# Patient Record
Sex: FEMALE | Race: BLACK OR AFRICAN AMERICAN | NOT HISPANIC OR LATINO | Employment: UNEMPLOYED
[De-identification: names, ages, dates, MRNs, and addresses within clinical notes are randomized per-mention and may not be internally consistent; named-entity substitution may affect disease eponyms.]

---

## 2017-12-17 ENCOUNTER — HEALTH MAINTENANCE LETTER (OUTPATIENT)
Age: 32
End: 2017-12-17

## 2017-12-26 ENCOUNTER — HOSPITAL ENCOUNTER (EMERGENCY)
Facility: CLINIC | Age: 32
Discharge: HOME | End: 2017-12-26
Attending: EMERGENCY MEDICINE | Admitting: EMERGENCY MEDICINE
Payer: COMMERCIAL

## 2017-12-26 ENCOUNTER — APPOINTMENT (OUTPATIENT)
Dept: ULTRASOUND IMAGING | Facility: CLINIC | Age: 32
End: 2017-12-26
Attending: EMERGENCY MEDICINE
Payer: COMMERCIAL

## 2017-12-26 VITALS
DIASTOLIC BLOOD PRESSURE: 55 MMHG | TEMPERATURE: 98.1 F | RESPIRATION RATE: 20 BRPM | OXYGEN SATURATION: 100 % | HEART RATE: 82 BPM | SYSTOLIC BLOOD PRESSURE: 98 MMHG

## 2017-12-26 DIAGNOSIS — O21.0 HYPEREMESIS GRAVIDARUM: ICD-10-CM

## 2017-12-26 LAB
ALBUMIN SERPL-MCNC: 3.9 G/DL (ref 3.4–5)
ALP SERPL-CCNC: 59 U/L (ref 40–150)
ALT SERPL W P-5'-P-CCNC: 18 U/L (ref 0–50)
ANION GAP SERPL CALCULATED.3IONS-SCNC: 8 MMOL/L (ref 3–14)
AST SERPL W P-5'-P-CCNC: 21 U/L (ref 0–45)
B-HCG SERPL-ACNC: ABNORMAL IU/L (ref 0–5)
BASOPHILS # BLD AUTO: 0 10E9/L (ref 0–0.2)
BASOPHILS NFR BLD AUTO: 0.4 %
BILIRUB SERPL-MCNC: 0.4 MG/DL (ref 0.2–1.3)
BUN SERPL-MCNC: 8 MG/DL (ref 7–30)
CALCIUM SERPL-MCNC: 8.9 MG/DL (ref 8.5–10.1)
CHLORIDE SERPL-SCNC: 103 MMOL/L (ref 94–109)
CO2 SERPL-SCNC: 25 MMOL/L (ref 20–32)
CREAT SERPL-MCNC: 0.52 MG/DL (ref 0.52–1.04)
DIFFERENTIAL METHOD BLD: NORMAL
EOSINOPHIL # BLD AUTO: 0 10E9/L (ref 0–0.7)
EOSINOPHIL NFR BLD AUTO: 0.6 %
ERYTHROCYTE [DISTWIDTH] IN BLOOD BY AUTOMATED COUNT: 12.1 % (ref 10–15)
GFR SERPL CREATININE-BSD FRML MDRD: >90 ML/MIN/1.7M2
GLUCOSE SERPL-MCNC: 84 MG/DL (ref 70–99)
HCT VFR BLD AUTO: 41.2 % (ref 35–47)
HGB BLD-MCNC: 14 G/DL (ref 11.7–15.7)
IMM GRANULOCYTES # BLD: 0 10E9/L (ref 0–0.4)
IMM GRANULOCYTES NFR BLD: 0.4 %
LIPASE SERPL-CCNC: 95 U/L (ref 73–393)
LYMPHOCYTES # BLD AUTO: 1.5 10E9/L (ref 0.8–5.3)
LYMPHOCYTES NFR BLD AUTO: 29.8 %
MCH RBC QN AUTO: 30.4 PG (ref 26.5–33)
MCHC RBC AUTO-ENTMCNC: 34 G/DL (ref 31.5–36.5)
MCV RBC AUTO: 90 FL (ref 78–100)
MONOCYTES # BLD AUTO: 0.5 10E9/L (ref 0–1.3)
MONOCYTES NFR BLD AUTO: 9.1 %
NEUTROPHILS # BLD AUTO: 3 10E9/L (ref 1.6–8.3)
NEUTROPHILS NFR BLD AUTO: 59.7 %
NRBC # BLD AUTO: 0 10*3/UL
NRBC BLD AUTO-RTO: 0 /100
PLATELET # BLD AUTO: 188 10E9/L (ref 150–450)
POTASSIUM SERPL-SCNC: 3.8 MMOL/L (ref 3.4–5.3)
PROT SERPL-MCNC: 8 G/DL (ref 6.8–8.8)
RBC # BLD AUTO: 4.6 10E12/L (ref 3.8–5.2)
SODIUM SERPL-SCNC: 136 MMOL/L (ref 133–144)
WBC # BLD AUTO: 5 10E9/L (ref 4–11)

## 2017-12-26 PROCEDURE — 25000128 H RX IP 250 OP 636: Performed by: EMERGENCY MEDICINE

## 2017-12-26 PROCEDURE — 76801 OB US < 14 WKS SINGLE FETUS: CPT

## 2017-12-26 PROCEDURE — 80053 COMPREHEN METABOLIC PANEL: CPT | Performed by: EMERGENCY MEDICINE

## 2017-12-26 PROCEDURE — 84702 CHORIONIC GONADOTROPIN TEST: CPT | Performed by: EMERGENCY MEDICINE

## 2017-12-26 PROCEDURE — 25000125 ZZHC RX 250: Performed by: EMERGENCY MEDICINE

## 2017-12-26 PROCEDURE — 25800025 ZZH RX 258: Performed by: EMERGENCY MEDICINE

## 2017-12-26 PROCEDURE — 96375 TX/PRO/DX INJ NEW DRUG ADDON: CPT

## 2017-12-26 PROCEDURE — 96361 HYDRATE IV INFUSION ADD-ON: CPT

## 2017-12-26 PROCEDURE — 83690 ASSAY OF LIPASE: CPT | Performed by: EMERGENCY MEDICINE

## 2017-12-26 PROCEDURE — 96365 THER/PROPH/DIAG IV INF INIT: CPT

## 2017-12-26 PROCEDURE — 99285 EMERGENCY DEPT VISIT HI MDM: CPT | Mod: 25

## 2017-12-26 PROCEDURE — 85025 COMPLETE CBC W/AUTO DIFF WBC: CPT | Performed by: EMERGENCY MEDICINE

## 2017-12-26 RX ORDER — ALUMINA, MAGNESIA, AND SIMETHICONE 2400; 2400; 240 MG/30ML; MG/30ML; MG/30ML
30 SUSPENSION ORAL ONCE
Status: DISCONTINUED | OUTPATIENT
Start: 2017-12-26 | End: 2017-12-26 | Stop reason: HOSPADM

## 2017-12-26 RX ORDER — PYRIDOXINE HCL (VITAMIN B6) 50 MG
1 TABLET ORAL AT BEDTIME
Qty: 30 TABLET | Refills: 0 | Status: SHIPPED | OUTPATIENT
Start: 2017-12-26 | End: 2018-06-15

## 2017-12-26 RX ORDER — METOCLOPRAMIDE HYDROCHLORIDE 5 MG/ML
10 INJECTION INTRAMUSCULAR; INTRAVENOUS ONCE
Status: COMPLETED | OUTPATIENT
Start: 2017-12-26 | End: 2017-12-26

## 2017-12-26 RX ORDER — PROCHLORPERAZINE MALEATE 10 MG
10 TABLET ORAL EVERY 6 HOURS PRN
Qty: 20 TABLET | Refills: 0 | Status: SHIPPED | OUTPATIENT
Start: 2017-12-26 | End: 2018-01-07

## 2017-12-26 RX ORDER — DIPHENHYDRAMINE HYDROCHLORIDE 50 MG/ML
25 INJECTION INTRAMUSCULAR; INTRAVENOUS ONCE
Status: COMPLETED | OUTPATIENT
Start: 2017-12-26 | End: 2017-12-26

## 2017-12-26 RX ADMIN — DIPHENHYDRAMINE HYDROCHLORIDE 25 MG: 50 INJECTION, SOLUTION INTRAMUSCULAR; INTRAVENOUS at 13:03

## 2017-12-26 RX ADMIN — SODIUM CHLORIDE 1000 ML: 9 INJECTION, SOLUTION INTRAVENOUS at 12:27

## 2017-12-26 RX ADMIN — DEXTROSE AND SODIUM CHLORIDE 1000 ML: 5; 450 INJECTION, SOLUTION INTRAVENOUS at 13:14

## 2017-12-26 RX ADMIN — METOCLOPRAMIDE 10 MG: 5 INJECTION, SOLUTION INTRAMUSCULAR; INTRAVENOUS at 13:05

## 2017-12-26 RX ADMIN — FAMOTIDINE 20 MG: 10 INJECTION, SOLUTION INTRAVENOUS at 13:00

## 2017-12-26 ASSESSMENT — ENCOUNTER SYMPTOMS
NAUSEA: 1
FEVER: 0
DIZZINESS: 1
ABDOMINAL PAIN: 1
DYSURIA: 0
VOMITING: 1
HEMATURIA: 0

## 2017-12-26 NOTE — ED NOTES
Patient who reports she is 4 weeks gestation arrives complaining of nausea, vomiting, and abdominal pain for 4 days. Alert and oriented, ABCs intact.

## 2017-12-26 NOTE — DISCHARGE INSTRUCTIONS
Discharge Instructions  Hyperemesis Gravidarum    You were seen today for hyperemesis gravidarum. It is very common to have some nausea and vomiting in early pregnancy (sometimes called  morning sickness,  although it happens at all times of day.) In hyperemesis gravidarum, however, the vomiting is much more severe, so you may become dehydrated and lose weight. We have treated you today and improved your symptoms, but they usually come back. You need to follow-up with your regular doctor or obstetrician within 1-2 days.    Return to the Emergency Department if:    You feel dizzy or light headed when standing up.    You are vomiting and can t keep fluids or medications down.    You urinate less often than usual and have dark yellow urine.     You feel much more ill or develop new symptoms.    What can I do to help myself?    Be sure to drink plenty of cold clear fluids (sports drinks and ginger ale) and suck on popsicles between meals.    Eat as soon as you feel hungry, or even before you feel hungry. Try to keep something on your stomach.     Avoid strong smells, or other things that make you feel nauseated.    Snack often and eat small meals high in protein or carbohydrates such as crackers, bread, nuts, and low-fat yogurt. Avoid spicy, greasy, or acid foods.    Avoid lying down right after you eat.    Take your prenatal vitamins at bedtime with a snack instead of taking them in the morning.    Ginger may make you feel better. Try eating a small piece of ginger, or having ginger-flavored hard candies.    Acupressure wrist bands are also helpful to some people.    Treatment:    Nausea medication.  Your doctor may send you home with a prescription medicine, like Zofran  (ondansetron), Compazine  (prochlorperazine), or Reglan  (metoclopramide).       Your doctor may recommend that you take non-prescription nausea medicines, like Dramamine  (dimenhydrinate).      Vitamins. Make sure your prenatal vitamins have 400  micrograms of Folic acid and have vitamin B6, since this may help your nausea and vomiting.   If you were given a prescription for medicine here today, be sure to read all of the information (including the package insert) that comes with your prescription.  This will include important information about the medicine, its side effects, and any warnings that you need to know about.  The pharmacist who fills the prescription can provide more information and answer questions you may have about the medicine.  If you have questions or concerns that the pharmacist cannot address, please call or return to the Emergency Department.         Opioid Medication Information    Pain medications are among the most commonly prescribed medicines, so we are including this information for all our patients. If you did not receive pain medication or get a prescription for pain medicine, you can ignore it.     You may have been given a prescription for an opioid (narcotic) pain medicine and/or have received a pain medicine while here in the Emergency Department. These medicines can make you drowsy or impaired. You must not drive, operate dangerous equipment, or engage in any other dangerous activities while taking these medications. If you drive while taking these medications, you could be arrested for DUI, or driving under the influence. Do not drink any alcohol while you are taking these medications.     Opioid pain medications can cause addiction. If you have a history of chemical dependency of any type, you are at a higher risk of becoming addicted to pain medications.  Only take these prescribed medications to treat your pain when all other options have been tried. Take it for as short a time and as few doses as possible. Store your pain pills in a secure place, as they are frequently stolen and provide a dangerous opportunity for children or visitors in your house to start abusing these powerful medications. We will not replace any  lost or stolen medicine.  As soon as your pain is better, you should flush all your remaining medication.     Many prescription pain medications contain Tylenol  (acetaminophen), including Vicodin , Tylenol #3 , Norco , Lortab , and Percocet .  You should not take any extra pills of Tylenol  if you are using these prescription medications or you can get very sick.  Do not ever take more than 3000 mg of acetaminophen in any 24 hour period.    All opioids tend to cause constipation. Drink plenty of water and eat foods that have a lot of fiber, such as fruits, vegetables, prune juice, apple juice and high fiber cereal.  Take a laxative if you don t move your bowels at least every other day. Miralax , Milk of Magnesia, Colace , or Senna  can be used to keep you regular.      Remember that you can always come back to the Emergency Department if you are not able to see your regular doctor in the amount of time listed above, if you get any new symptoms, or if there is anything that worries you.

## 2017-12-26 NOTE — ED AVS SNAPSHOT
[facility] Emergency Department    [address]    [facility] [address]    Phone:  [phone]    Fax:  [phone]                                       Lorna Kenyon   MRN: [id number]    Department:  [facility] Emergency Department   Date of Visit:  12/26/2017           After Visit Summary Signature Page     I have received my discharge instructions, and my questions have been answered. I have discussed any challenges I see with this plan with the nurse or doctor.    ..........................................................................................................................................  Patient/Patient Representative Signature      ..........................................................................................................................................  Patient Representative Print Name and Relationship to Patient    ..................................................               ................................................  Date                                            Time    ..........................................................................................................................................  Reviewed by Signature/Title    ...................................................              ..............................................  Date                                                            Time

## 2017-12-26 NOTE — ED AVS SNAPSHOT
[facility] Emergency Department    [address]    [facility] [address]    Phone:  [phone]    Fax:  [phone]                                       Lorna Kenyon   MRN: [id number]    Department:  [facility] Emergency Department   Date of Visit:  12/26/2017           Patient Information     Date Of Birth          1985        Your diagnoses for this visit were:     Hyperemesis gravidarum        You were seen by Theresa Valdez MD.      Follow-up Information     Follow up with Keep upcoming OB appointment.        Follow up with [facility] Emergency Department.    Specialty:  EMERGENCY MEDICINE    Why:  If symptoms worsen    Contact information:    [address]  [facility] [address] [phone]        Discharge Instructions       Discharge Instructions  Hyperemesis Gravidarum    You were seen today for hyperemesis gravidarum. It is very common to have some nausea and vomiting in early pregnancy (sometimes called  morning sickness,  although it happens at all times of day.) In hyperemesis gravidarum, however, the vomiting is much more severe, so you may become dehydrated and lose weight. We have treated you today and improved your symptoms, but they usually come back. You need to follow-up with your regular doctor or obstetrician within 1-2 days.    Return to the Emergency Department if:    You feel dizzy or light headed when standing up.    You are vomiting and can t keep fluids or medications down.    You urinate less often than usual and have dark yellow urine.     You feel much more ill or develop new symptoms.    What can I do to help myself?    Be sure to drink plenty of cold clear fluids (sports drinks and ginger ale) and suck on popsicles between meals.    Eat as soon as you feel hungry, or even before you feel hungry. Try to keep something on your stomach.     Avoid strong smells, or other things that make  you feel nauseated.    Snack often and eat small meals high in protein or carbohydrates such as crackers, bread, nuts, and low-fat yogurt. Avoid spicy, greasy, or acid foods.    Avoid lying down right after you eat.    Take your prenatal vitamins at bedtime with a snack instead of taking them in the morning.    Holly may make you feel better. Try eating a small piece of holly, or having holly-flavored hard candies.    Acupressure wrist bands are also helpful to some people.    Treatment:    Nausea medication.  Your doctor may send you home with a prescription medicine, like Zofran  (ondansetron), Compazine  (prochlorperazine), or Reglan  (metoclopramide).       Your doctor may recommend that you take non-prescription nausea medicines, like Dramamine  (dimenhydrinate).      Vitamins. Make sure your prenatal vitamins have 400 micrograms of Folic acid and have vitamin B6, since this may help your nausea and vomiting.   If you were given a prescription for medicine here today, be sure to read all of the information (including the package insert) that comes with your prescription.  This will include important information about the medicine, its side effects, and any warnings that you need to know about.  The pharmacist who fills the prescription can provide more information and answer questions you may have about the medicine.  If you have questions or concerns that the pharmacist cannot address, please call or return to the Emergency Department.         Opioid Medication Information    Pain medications are among the most commonly prescribed medicines, so we are including this information for all our patients. If you did not receive pain medication or get a prescription for pain medicine, you can ignore it.     You may have been given a prescription for an opioid (narcotic) pain medicine and/or have received a pain medicine while here in the Emergency Department. These medicines can make you drowsy or impaired. You  must not drive, operate dangerous equipment, or engage in any other dangerous activities while taking these medications. If you drive while taking these medications, you could be arrested for DUI, or driving under the influence. Do not drink any alcohol while you are taking these medications.     Opioid pain medications can cause addiction. If you have a history of chemical dependency of any type, you are at a higher risk of becoming addicted to pain medications.  Only take these prescribed medications to treat your pain when all other options have been tried. Take it for as short a time and as few doses as possible. Store your pain pills in a secure place, as they are frequently stolen and provide a dangerous opportunity for children or visitors in your house to start abusing these powerful medications. We will not replace any lost or stolen medicine.  As soon as your pain is better, you should flush all your remaining medication.     Many prescription pain medications contain Tylenol  (acetaminophen), including Vicodin , Tylenol #3 , Norco , Lortab , and Percocet .  You should not take any extra pills of Tylenol  if you are using these prescription medications or you can get very sick.  Do not ever take more than 3000 mg of acetaminophen in any 24 hour period.    All opioids tend to cause constipation. Drink plenty of water and eat foods that have a lot of fiber, such as fruits, vegetables, prune juice, apple juice and high fiber cereal.  Take a laxative if you don t move your bowels at least every other day. Miralax , Milk of Magnesia, Colace , or Senna  can be used to keep you regular.      Remember that you can always come back to the Emergency Department if you are not able to see your regular doctor in the amount of time listed above, if you get any new symptoms, or if there is anything that worries you.        24 Hour Appointment Hotline       To make an appointment at any [facility]3], call  [phone] ([phone]). If you don't have a family doctor or clinic, we will help you find one. [place] clinics are conveniently located to serve the needs of you and your family.             Review of your medicines      START taking        Dose / Directions Last dose taken    B-6 50 MG Tabs   Dose:  1 tablet   Quantity:  30 tablet        Take 1 tablet by mouth At Bedtime   Refills:  0        doxylamine 25 MG Tabs tablet   Commonly known as:  UNISOM   Dose:  25 mg   Quantity:  30 each        Take 1 tablet (25 mg) by mouth At Bedtime   Refills:  0        prochlorperazine 10 MG tablet   Commonly known as:  COMPAZINE   Dose:  10 mg   Quantity:  20 tablet        Take 1 tablet (10 mg) by mouth every 6 hours as needed for nausea or vomiting   Refills:  0          Our records show that you are taking the medicines listed below. If these are incorrect, please call your family doctor or clinic.        Dose / Directions Last dose taken    docusate sodium 100 MG tablet   Commonly known as:  COLACE   Dose:  100 mg   Quantity:  30 tablet        Take 100 mg by mouth daily (while using narcotics)   Refills:  1        HYDROcodone-acetaminophen 5-325 MG per tablet   Commonly known as:  NORCO   Dose:  1 tablet   Quantity:  20 tablet        Take 1 tablet by mouth every 6 hours as needed for moderate to severe pain   Refills:  0        ibuprofen 600 MG tablet   Commonly known as:  ADVIL/MOTRIN   Dose:  600 mg   Quantity:  90 tablet        Take 1 tablet (600 mg) by mouth every 6 hours as needed for moderate pain   Refills:  1        * oxyCODONE-acetaminophen 5-325 MG per tablet   Commonly known as:  PERCOCET   Dose:  1-2 tablet   Quantity:  30 tablet        Take 1-2 tablets by mouth every 6 hours as needed for pain   Refills:  0        * oxyCODONE-acetaminophen 5-325 MG per tablet   Commonly known as:  PERCOCET   Dose:  1-2 tablet   Quantity:  15 tablet        Take 1-2 tablets by mouth every 4 hours as needed for pain  (moderate to severe)   Refills:  0        * Notice:  This list has 2 medication(s) that are the same as other medications prescribed for you. Read the directions carefully, and ask your doctor or other care provider to review them with you.            Prescriptions were sent or printed at these locations (3 Prescriptions)                   Other Prescriptions                Printed at Department/Unit printer (3 of 3)         prochlorperazine (COMPAZINE) 10 MG tablet               doxylamine (UNISOM) 25 MG TABS tablet               Pyridoxine HCl (B-6) 50 MG TABS                Procedures and tests performed during your visit     CBC + differential    Comprehensive metabolic panel    HCG QUANTitative pregnancy (blood)    Lipase    OB  US 1st trimester w transvag      Orders Needing Specimen Collection     None      Pending Results     Date and Time Order Name Status Description    12/26/2017 1224 OB  US 1st trimester w transvag Preliminary             Pending Culture Results     No orders found from 12/24/2017 to 12/27/2017.            Pending Results Instructions     If you had any lab results that were not finalized at the time of your Discharge, you can call the ED Lab Result RN at [phone]. You will be contacted by this team for any positive Lab results or changes in treatment. The nurses are available 7 days a week from 10A to 6:30P.  You can leave a message 24 hours per day and they will return your call.        Test Results From Your Hospital Stay        12/26/2017  1:01 PM      Component Results     Component Value Ref Range & Units Status    WBC 5.0 4.0 - 11.0 10e9/L Final    RBC Count 4.60 3.8 - 5.2 10e12/L Final    Hemoglobin 14.0 11.7 - 15.7 g/dL Final    Hematocrit 41.2 35.0 - 47.0 % Final    MCV 90 78 - 100 fl Final    MCH 30.4 26.5 - 33.0 pg Final    MCHC 34.0 31.5 - 36.5 g/dL Final    RDW 12.1 10.0 - 15.0 % Final    Platelet Count 188 150 - 450 10e9/L Final    Diff Method Automated Method  Final     % Neutrophils 59.7 % Final    % Lymphocytes 29.8 % Final    % Monocytes 9.1 % Final    % Eosinophils 0.6 % Final    % Basophils 0.4 % Final    % Immature Granulocytes 0.4 % Final    Nucleated RBCs 0 0 /100 Final    Absolute Neutrophil 3.0 1.6 - 8.3 10e9/L Final    Absolute Lymphocytes 1.5 0.8 - 5.3 10e9/L Final    Absolute Monocytes 0.5 0.0 - 1.3 10e9/L Final    Absolute Eosinophils 0.0 0.0 - 0.7 10e9/L Final    Absolute Basophils 0.0 0.0 - 0.2 10e9/L Final    Abs Immature Granulocytes 0.0 0 - 0.4 10e9/L Final    Absolute Nucleated RBC 0.0  Final         12/26/2017  1:21 PM      Component Results     Component Value Ref Range & Units Status    Sodium 136 133 - 144 mmol/L Final    Potassium 3.8 3.4 - 5.3 mmol/L Final    Chloride 103 94 - 109 mmol/L Final    Carbon Dioxide 25 20 - 32 mmol/L Final    Anion Gap 8 3 - 14 mmol/L Final    Glucose 84 70 - 99 mg/dL Final    Urea Nitrogen 8 7 - 30 mg/dL Final    Creatinine 0.52 0.52 - 1.04 mg/dL Final    GFR Estimate >90 >60 mL/min/1.7m2 Final    Non  GFR Calc    GFR Estimate If Black >90 >60 mL/min/1.7m2 Final    African American GFR Calc    Calcium 8.9 8.5 - 10.1 mg/dL Final    Bilirubin Total 0.4 0.2 - 1.3 mg/dL Final    Albumin 3.9 3.4 - 5.0 g/dL Final    Protein Total 8.0 6.8 - 8.8 g/dL Final    Alkaline Phosphatase 59 40 - 150 U/L Final    ALT 18 0 - 50 U/L Final    AST 21 0 - 45 U/L Final         12/26/2017  1:21 PM      Component Results     Component Value Ref Range & Units Status    Lipase 95 73 - 393 U/L Final         12/26/2017  1:36 PM      Component Results     Component Value Ref Range & Units Status    HCG Quantitative Serum 42435 (H) 0 - 5 IU/L Final    Specimen run with a dilution         12/26/2017  2:05 PM      Narrative     OBSTETRIC ULTRASOUND WITH ENDOVAGINAL TRANSDUCER   12/26/2017 1:58 PM     HISTORY: Abdominal pain, early pregnancy.    TECHNIQUE:  Endovaginal sonography was added to the transabdominal  exam to better evaluate the  uterus and ovaries because of inadequate  bladder fullness.    COMPARISON: None.    FINDINGS: There is a single, live intrauterine pregnancy.  Estimated gestational age by current ultrasound measurement: 6 weeks 6  days.  Estimated date of delivery based on this ultrasound: 8/15/2018.  Crown-rump length: 0.8 cm.   Cardiac activity: 128 bpm.   Yolk sac: Normal.  Subchorionic hemorrhage: None.    Right ovary: Unremarkable.  Left ovary: Unremarkable.  Adnexal mass: None.  Free pelvic fluid: None.        Impression     IMPRESSION: Single live intrauterine pregnancy, 6 weeks 6 days +/-1  week gestational age.                    Clinical Quality Measure: Blood Pressure Screening     Your blood pressure was checked while you were in the emergency department today. The last reading we obtained was  BP: 102/59 . Please read the guidelines below about what these numbers mean and what you should do about them.  If your systolic blood pressure (the top number) is less than 120 and your diastolic blood pressure (the bottom number) is less than 80, then your blood pressure is normal. There is nothing more that you need to do about it.  If your systolic blood pressure (the top number) is 120-139 or your diastolic blood pressure (the bottom number) is 80-89, your blood pressure may be higher than it should be. You should have your blood pressure rechecked within a year by a primary care provider.  If your systolic blood pressure (the top number) is 140 or greater or your diastolic blood pressure (the bottom number) is 90 or greater, you may have high blood pressure. High blood pressure is treatable, but if left untreated over time it can put you at risk for heart attack, stroke, or kidney failure. You should have your blood pressure rechecked by a primary care provider within the next 4 weeks.  If your provider in the emergency department today gave you specific instructions to follow-up with your doctor or provider even sooner  "than that, you should follow that instruction and not wait for up to 4 weeks for your follow-up visit.        Thank you for choosing [facility]       Thank you for choosing [facility] for your care. Our goal is always to provide you with excellent care. Hearing back from our patients is one way we can continue to improve our services. Please take a few minutes to complete the written survey that you may receive in the mail after you visit with us. Thank you!        [employer] Information     [email or web address] lets you send messages to your doctor, view your test results, renew your prescriptions, schedule appointments and more. To sign up, go to [email or web address] . Click on \"Log in\" on the left side of the screen, which will take you to the Welcome page. Then click on \"Sign up Now\" on the right side of the page.     You will be asked to enter the access code listed below, as well as some personal information. Please follow the directions to create your username and password.     Your access code is: [email or web address]  Expires: 3/26/2018  2:45 PM     Your access code will  in 90 days. If you need help or a new code, please call your [facility] clinic or [phone].        Care EveryWhere ID     This is your Care EveryWhere ID. This could be used by other organizations to access your [facility] medical records  [id number]        Equal Access to Services     LOGAN SON : Hadii hay gardner Sobilly, waaxda luqadaha, qaybta kaalmada fuad, matthias drake. So [facility] [phone].    ATENCIÓN: Si habla español, tiene a cisse disposición servicios gratuitos de asistencia lingüística. Dontae al [phone].    We comply with applicable federal civil rights laws and [place] laws. We do not discriminate on the basis of race, color, national origin, age, disability, sex, sexual orientation, or gender identity.            After Visit Summary       This is your record. Keep this with you and show to your " community pharmacist(s) and doctor(s) at your next visit.

## 2017-12-26 NOTE — ED PROVIDER NOTES
History     Chief Complaint:  Vomiting    HPI   Lorna Kenyon is a 32 year old female, 4 weeks pregnant, who presents with nausea and vomiting. The patient states that she has had ongoing epigastric pain ongoing for 4 days with associated nausea and vomiting, prompting her ED visit. Here, the patient states that she is unable to keep water and food down. She additionally notes associated dizziness. She denies blood in vomit, lower abdominal pian, vaginal bleeding and discharge, urinary symptoms, fever, or acid reflux prior to pregnancy. She has not seen OB GYN yet, but has an appointment on January 2. Her last menstrual period was November 15th.       Allergies:  NKDA     Medications:    Prenatal vitamins    Past Medical History:    The patient denies any significant past medical history.     Past Surgical History:    Laparoscopic cholecystectomy    Family History:    No past pertinent family history.     Social History:  Presents with her   Negative for alcohol use.  Negative for tobacco use.   Marital Status:   [2]     Review of Systems   Constitutional: Negative for fever.   Gastrointestinal: Positive for abdominal pain, nausea and vomiting.   Genitourinary: Negative for dysuria, hematuria, vaginal bleeding and vaginal discharge.   Neurological: Positive for dizziness.   All other systems reviewed and are negative.      Physical Exam   First Vitals:  BP: 99/58  Pulse: 82  Temp: 98.1  F (36.7  C)  Resp: 20  SpO2: 100 %    Physical Exam    Gen: alert  HEENT: PERRL, oropharynx clear  Neck: normal ROM  CV: RRR, no murmurs  Pulm: breath sounds equal, lungs clear  Abd: Soft, diffuse abdominal tenderness, greatest in epigastric region  Back: no evidence of injury, no cva tenderness  MSK: no deformity, moves all extremities  Skin: no rash  Neuro: alert, appropriate conversation and interaction     Emergency Department Course     Imaging:  Radiographic findings were communicated with the patient who  voiced understanding of the findings.  US OB 1st trimester w transvag:  Single live intrauterine pregnancy, 6 weeks 6 days +/-1  week gestational age. As per radiology.    Laboratory:  CBC: WBC: 5.0, HGB: 14.0, PLT: 188  CMP: all WNL (Creatinine: 0.52)    Lipase:95  HCG quantitative:54588 (H)    Interventions:  1300 Pepcid, 20 mg, IV  1303 Benadryl, 25 mg, IV  1305 Reglan, 10 mg, IV  1309 GI Cocktail (Maalox/Mylanta and viscous Lidocaine), 30 mL suspension, PO    1314 Dextrose 5% and 0.45% NaCl infusion, 1L, IV    Emergency Department Course:  Nursing notes and vitals reviewed.     1209  I performed an exam of the patient as documented above.     IV inserted. Medicine administered as documented above. Blood drawn. This was sent to the lab for further testing, results above.    The patient was sent for a US OB while in the emergency department, findings above.     1423 I rechecked the patient and discussed the results of her workup thus far.     Findings and plan explained to the Patient. Patient discharged home with instructions regarding supportive care, medications, and reasons to return. The importance of close follow-up was reviewed. The patient was prescribed Unisom, Compazine, and Pyridoxine.    I personally reviewed the laboratory results with the Patient and answered all related questions prior to discharge.    Impression & Plan      Medical Decision Making:  Lorna Kenyon is a 32 year old female who presents for evaluation of nausea and vomiting.  She is currently pregnant.  Nonetheless, I considered a broad differential diagnosis for this patient including viral gastroenteritis, food poisoning, bowel obstruction, intra-abdominal infection such as colitis, cholecystitis, appendicitis, etc.  No urinary symptoms to suggest UTI.  There are no signs of worrisome intra-abdominal pathologies detected during the visit today.  The patient has a completely benign abdominal exam without rebound, guarding, or  marked tenderness to palpation.  Specifically, she has no RLQ tenderness. Electrolytes are unremarkable.   Feels much improved after interventions in ED.  I discussed strategies for dealing with nausea and vomiting of pregnancy.  I recommended that she try B6 and doxylamine as first line treatment in accordance with ACOG guidelines and gave a prescription for compazine for breakthrough symptoms.  I believe all of her symptoms are at this point explained by the pregnancy and nausea and vomiting of pregnancy.     Diagnosis:    ICD-10-CM   1. Hyperemesis gravidarum O21.0       Disposition:  discharged to home    Discharge Medications:  New Prescriptions    DOXYLAMINE (UNISOM) 25 MG TABS TABLET    Take 1 tablet (25 mg) by mouth At Bedtime    PROCHLORPERAZINE (COMPAZINE) 10 MG TABLET    Take 1 tablet (10 mg) by mouth every 6 hours as needed for nausea or vomiting    PYRIDOXINE HCL (B-6) 50 MG TABS    Take 1 tablet by mouth At Bedtime     I, Brittany Vallecillo, am serving as a scribe on 12/26/2017 at 12:09 PM to personally document services performed by Theresa Valdez based on my observations and the provider's statements to me.      Brittany Vallecillo  12/26/2017   [facility] EMERGENCY DEPARTMENT       Theresa Valdez MD  12/26/17 8581

## 2018-01-07 ENCOUNTER — HOSPITAL ENCOUNTER (EMERGENCY)
Facility: CLINIC | Age: 33
Discharge: HOME | End: 2018-01-07
Attending: EMERGENCY MEDICINE | Admitting: EMERGENCY MEDICINE
Payer: COMMERCIAL

## 2018-01-07 VITALS
RESPIRATION RATE: 20 BRPM | HEART RATE: 80 BPM | TEMPERATURE: 98.4 F | SYSTOLIC BLOOD PRESSURE: 99 MMHG | DIASTOLIC BLOOD PRESSURE: 63 MMHG | OXYGEN SATURATION: 100 %

## 2018-01-07 DIAGNOSIS — O21.0 HYPEREMESIS GRAVIDARUM: ICD-10-CM

## 2018-01-07 LAB
ANION GAP SERPL CALCULATED.3IONS-SCNC: 7 MMOL/L (ref 3–14)
BASOPHILS # BLD AUTO: 0 10E9/L (ref 0–0.2)
BASOPHILS NFR BLD AUTO: 0.2 %
BUN SERPL-MCNC: 9 MG/DL (ref 7–30)
CALCIUM SERPL-MCNC: 8.8 MG/DL (ref 8.5–10.1)
CHLORIDE SERPL-SCNC: 102 MMOL/L (ref 94–109)
CO2 SERPL-SCNC: 27 MMOL/L (ref 20–32)
CREAT SERPL-MCNC: 0.45 MG/DL (ref 0.52–1.04)
DIFFERENTIAL METHOD BLD: NORMAL
EOSINOPHIL # BLD AUTO: 0.1 10E9/L (ref 0–0.7)
EOSINOPHIL NFR BLD AUTO: 1 %
ERYTHROCYTE [DISTWIDTH] IN BLOOD BY AUTOMATED COUNT: 11.9 % (ref 10–15)
GFR SERPL CREATININE-BSD FRML MDRD: >90 ML/MIN/1.7M2
GLUCOSE SERPL-MCNC: 83 MG/DL (ref 70–99)
HCT VFR BLD AUTO: 41.8 % (ref 35–47)
HGB BLD-MCNC: 14.4 G/DL (ref 11.7–15.7)
IMM GRANULOCYTES # BLD: 0 10E9/L (ref 0–0.4)
IMM GRANULOCYTES NFR BLD: 0.3 %
LYMPHOCYTES # BLD AUTO: 1.4 10E9/L (ref 0.8–5.3)
LYMPHOCYTES NFR BLD AUTO: 25.1 %
MCH RBC QN AUTO: 30.6 PG (ref 26.5–33)
MCHC RBC AUTO-ENTMCNC: 34.4 G/DL (ref 31.5–36.5)
MCV RBC AUTO: 89 FL (ref 78–100)
MONOCYTES # BLD AUTO: 0.5 10E9/L (ref 0–1.3)
MONOCYTES NFR BLD AUTO: 8.6 %
NEUTROPHILS # BLD AUTO: 3.7 10E9/L (ref 1.6–8.3)
NEUTROPHILS NFR BLD AUTO: 64.8 %
NRBC # BLD AUTO: 0 10*3/UL
NRBC BLD AUTO-RTO: 0 /100
PLATELET # BLD AUTO: 213 10E9/L (ref 150–450)
POTASSIUM SERPL-SCNC: 3.4 MMOL/L (ref 3.4–5.3)
RBC # BLD AUTO: 4.7 10E12/L (ref 3.8–5.2)
SODIUM SERPL-SCNC: 136 MMOL/L (ref 133–144)
WBC # BLD AUTO: 5.7 10E9/L (ref 4–11)

## 2018-01-07 PROCEDURE — 96361 HYDRATE IV INFUSION ADD-ON: CPT

## 2018-01-07 PROCEDURE — 96375 TX/PRO/DX INJ NEW DRUG ADDON: CPT

## 2018-01-07 PROCEDURE — 85025 COMPLETE CBC W/AUTO DIFF WBC: CPT | Performed by: EMERGENCY MEDICINE

## 2018-01-07 PROCEDURE — 25000128 H RX IP 250 OP 636: Performed by: EMERGENCY MEDICINE

## 2018-01-07 PROCEDURE — 80048 BASIC METABOLIC PNL TOTAL CA: CPT | Performed by: EMERGENCY MEDICINE

## 2018-01-07 PROCEDURE — 99284 EMERGENCY DEPT VISIT MOD MDM: CPT | Mod: 25

## 2018-01-07 PROCEDURE — 96374 THER/PROPH/DIAG INJ IV PUSH: CPT

## 2018-01-07 RX ORDER — DIPHENHYDRAMINE HYDROCHLORIDE 50 MG/ML
12.5 INJECTION INTRAMUSCULAR; INTRAVENOUS ONCE
Status: DISCONTINUED | OUTPATIENT
Start: 2018-01-07 | End: 2018-01-07

## 2018-01-07 RX ORDER — METOCLOPRAMIDE HYDROCHLORIDE 5 MG/ML
10 INJECTION INTRAMUSCULAR; INTRAVENOUS ONCE
Status: COMPLETED | OUTPATIENT
Start: 2018-01-07 | End: 2018-01-07

## 2018-01-07 RX ORDER — DIPHENHYDRAMINE HYDROCHLORIDE 50 MG/ML
25 INJECTION INTRAMUSCULAR; INTRAVENOUS ONCE
Status: COMPLETED | OUTPATIENT
Start: 2018-01-07 | End: 2018-01-07

## 2018-01-07 RX ORDER — METOCLOPRAMIDE HYDROCHLORIDE 5 MG/ML
10 INJECTION INTRAMUSCULAR; INTRAVENOUS ONCE
Status: DISCONTINUED | OUTPATIENT
Start: 2018-01-07 | End: 2018-01-07

## 2018-01-07 RX ORDER — PROCHLORPERAZINE MALEATE 10 MG
10 TABLET ORAL EVERY 6 HOURS PRN
Qty: 20 TABLET | Refills: 0 | Status: ON HOLD | OUTPATIENT
Start: 2018-01-07 | End: 2018-06-16

## 2018-01-07 RX ADMIN — SODIUM CHLORIDE 1000 ML: 9 INJECTION, SOLUTION INTRAVENOUS at 16:05

## 2018-01-07 RX ADMIN — METOCLOPRAMIDE 10 MG: 5 INJECTION, SOLUTION INTRAMUSCULAR; INTRAVENOUS at 16:05

## 2018-01-07 RX ADMIN — DIPHENHYDRAMINE HYDROCHLORIDE 25 MG: 50 INJECTION, SOLUTION INTRAMUSCULAR; INTRAVENOUS at 16:05

## 2018-01-07 ASSESSMENT — ENCOUNTER SYMPTOMS
NAUSEA: 1
VOMITING: 1

## 2018-01-07 NOTE — ED NOTES
While infusing benadryl patient complained for dizziness and requesting water or juice. When letting her know that we want her to wait a little bit until she feels better she started to cry.

## 2018-01-07 NOTE — ED AVS SNAPSHOT
[facility] Emergency Department    [address] [place] [address]    Phone:  [phone]    Fax:  [phone]                                       Lorna Kenyon   MRN: [id number]    Department:  [facility] Emergency Department   Date of Visit:  1/7/2018           Patient Information     Date Of Birth          1985        Your diagnoses for this visit were:     Hyperemesis gravidarum        You were seen by Chris Crowder MD.      Follow-up Information     Please follow up.    Why:  Please call your OB office tomorrow to get a recheck appointment and to see if your doctor can help you get nausea medicine or IV fluids at home        Discharge Instructions         Hyperemesis Gravidarum  Hyperemesis gravidarum is a severe form of morning sickness that can affect some women during pregnancy. It may develop around the 5th week and last until the 16th week of pregnancy. In some women, it may last longer. Symptoms include severe nausea and vomiting. This can lead to problems such as as weight loss and dehydration.  It is not clear what causes hyperemesis gravidarum. It may be due to rising hormone levels early in the pregnancy. It can be a serious threat to mother and fetus, if symptoms are severe. Therefore, follow the advice below carefully. If symptoms are not controlled by home measures, a hospital stay may be needed. IV (intravenous) fluids and medicines may be given.  Home care    Diet    Keep a log of the foods you eat and how they affect your symptoms. Avoid foods that trigger your symptoms.    Eat frequent small meals throughout the day rather than three large meals. This can help keep the stomach from being empty, which can make nausea worse.    Choose foods that are high in carbohydrates. Eating foods high in protein may also help. Limit greasy or spicy foods.    Before getting out of bed in the morning, try eating crackers or dry toast. This may  help settle your stomach.    Drink cold, clear liquids. Drinking small amounts of liquids with electrolytes, such as sports drinks may help as well.    Medicine    If needed, your healthcare provider may prescribe certain medicines to help relieve nausea and vomiting. Vitamin B6 and juan daniel may also be advised. Don t try any over-the-counter medicines or home remedies without talking to your provider first.  Follow-up care  Follow up with your healthcare provider, or as advised.  When to seek medical advice  Call your healthcare provider right away if any of these occur:    Signs of dehydration (dry mouth, extreme thirst, dark urine or little urine output, dizziness, weakness, or fainting)    Vomiting that won t stop    Inability to keep down liquids    Frequent diarrhea    Weight loss or no weight gain over a 2-week period    Severe constant pain in the lower right abdomen    Fever of 100.4 F (38 C) or higher, or as directed by your provider  Date Last Reviewed: 8/20/2015 2000-2017 [employer]. [address] [place] [address]. All rights reserved. This information is not intended as a substitute for professional medical care. Always follow your healthcare professional's instructions.          24 Hour Appointment Hotline       To make an appointment at any [facility], call [phone] ([phone]). If you don't have a family doctor or clinic, we will help you find one. [place] clinics are conveniently located to serve the needs of you and your family.             Review of your medicines      Our records show that you are taking the medicines listed below. If these are incorrect, please call your family doctor or clinic.        Dose / Directions Last dose taken    B-6 50 MG Tabs   Dose:  1 tablet   Quantity:  30 tablet        Take 1 tablet by mouth At Bedtime   Refills:  0        docusate sodium 100 MG tablet   Commonly known as:  COLACE   Dose:  100 mg   Quantity:  30 tablet         Take 100 mg by mouth daily (while using narcotics)   Refills:  1        doxylamine 25 MG Tabs tablet   Commonly known as:  UNISOM   Dose:  25 mg   Quantity:  30 each        Take 1 tablet (25 mg) by mouth At Bedtime   Refills:  0        HYDROcodone-acetaminophen 5-325 MG per tablet   Commonly known as:  NORCO   Dose:  1 tablet   Quantity:  20 tablet        Take 1 tablet by mouth every 6 hours as needed for moderate to severe pain   Refills:  0        ibuprofen 600 MG tablet   Commonly known as:  ADVIL/MOTRIN   Dose:  600 mg   Quantity:  90 tablet        Take 1 tablet (600 mg) by mouth every 6 hours as needed for moderate pain   Refills:  1        * oxyCODONE-acetaminophen 5-325 MG per tablet   Commonly known as:  PERCOCET   Dose:  1-2 tablet   Quantity:  30 tablet        Take 1-2 tablets by mouth every 6 hours as needed for pain   Refills:  0        * oxyCODONE-acetaminophen 5-325 MG per tablet   Commonly known as:  PERCOCET   Dose:  1-2 tablet   Quantity:  15 tablet        Take 1-2 tablets by mouth every 4 hours as needed for pain (moderate to severe)   Refills:  0        prochlorperazine 10 MG tablet   Commonly known as:  COMPAZINE   Dose:  10 mg   Quantity:  20 tablet        Take 1 tablet (10 mg) by mouth every 6 hours as needed for nausea or vomiting   Refills:  0        * Notice:  This list has 2 medication(s) that are the same as other medications prescribed for you. Read the directions carefully, and ask your doctor or other care provider to review them with you.            Prescriptions were sent or printed at these locations (1 Prescription)                   Other Prescriptions                Printed at Department/Unit printer (1 of 1)         prochlorperazine (COMPAZINE) 10 MG tablet                Procedures and tests performed during your visit     Basic metabolic panel    CBC with platelets differential      Orders Needing Specimen Collection     Ordered          01/07/18 1624  UA with  Microscopic - STAT, [facility]: STAT, Needs to be Collected     Scheduled Task Status   01/07/18 1625 Collect UA with Microscopic Open   Order Class:  PCU Collect                  Pending Results     No orders found from 1/5/2018 to 1/8/2018.            Pending Culture Results     No orders found from 1/5/2018 to 1/8/2018.            Pending Results Instructions     If you had any lab results that were not finalized at the time of your Discharge, you can call the ED Lab Result RN at [phone]. You will be contacted by this team for any positive Lab results or changes in treatment. The nurses are available 7 days a week from 10A to 6:30P.  You can leave a message 24 hours per day and they will return your call.        Test Results From Your Hospital Stay        1/7/2018  4:31 PM      Component Results     Component Value Ref Range & Units Status    WBC 5.7 4.0 - 11.0 10e9/L Final    RBC Count 4.70 3.8 - 5.2 10e12/L Final    Hemoglobin 14.4 11.7 - 15.7 g/dL Final    Hematocrit 41.8 35.0 - 47.0 % Final    MCV 89 78 - 100 fl Final    MCH 30.6 26.5 - 33.0 pg Final    MCHC 34.4 31.5 - 36.5 g/dL Final    RDW 11.9 10.0 - 15.0 % Final    Platelet Count 213 150 - 450 10e9/L Final    Diff Method Automated Method  Final    % Neutrophils 64.8 % Final    % Lymphocytes 25.1 % Final    % Monocytes 8.6 % Final    % Eosinophils 1.0 % Final    % Basophils 0.2 % Final    % Immature Granulocytes 0.3 % Final    Nucleated RBCs 0 0 /100 Final    Absolute Neutrophil 3.7 1.6 - 8.3 10e9/L Final    Absolute Lymphocytes 1.4 0.8 - 5.3 10e9/L Final    Absolute Monocytes 0.5 0.0 - 1.3 10e9/L Final    Absolute Eosinophils 0.1 0.0 - 0.7 10e9/L Final    Absolute Basophils 0.0 0.0 - 0.2 10e9/L Final    Abs Immature Granulocytes 0.0 0 - 0.4 10e9/L Final    Absolute Nucleated RBC 0.0  Final         1/7/2018  4:45 PM      Component Results     Component Value Ref Range & Units Status    Sodium 136 133 - 144 mmol/L Final    Potassium 3.4 3.4 - 5.3 mmol/L  Final    Chloride 102 94 - 109 mmol/L Final    Carbon Dioxide 27 20 - 32 mmol/L Final    Anion Gap 7 3 - 14 mmol/L Final    Glucose 83 70 - 99 mg/dL Final    Urea Nitrogen 9 7 - 30 mg/dL Final    Creatinine 0.45 (L) 0.52 - 1.04 mg/dL Final    GFR Estimate >90 >60 mL/min/1.7m2 Final    Non  GFR Calc    GFR Estimate If Black >90 >60 mL/min/1.7m2 Final    African American GFR Calc    Calcium 8.8 8.5 - 10.1 mg/dL Final                Clinical Quality Measure: Blood Pressure Screening     Your blood pressure was checked while you were in the emergency department today. The last reading we obtained was  BP: 99/63 . Please read the guidelines below about what these numbers mean and what you should do about them.  If your systolic blood pressure (the top number) is less than 120 and your diastolic blood pressure (the bottom number) is less than 80, then your blood pressure is normal. There is nothing more that you need to do about it.  If your systolic blood pressure (the top number) is 120-139 or your diastolic blood pressure (the bottom number) is 80-89, your blood pressure may be higher than it should be. You should have your blood pressure rechecked within a year by a primary care provider.  If your systolic blood pressure (the top number) is 140 or greater or your diastolic blood pressure (the bottom number) is 90 or greater, you may have high blood pressure. High blood pressure is treatable, but if left untreated over time it can put you at risk for heart attack, stroke, or kidney failure. You should have your blood pressure rechecked by a primary care provider within the next 4 weeks.  If your provider in the emergency department today gave you specific instructions to follow-up with your doctor or provider even sooner than that, you should follow that instruction and not wait for up to 4 weeks for your follow-up visit.        Thank you for choosing [facility]       Thank you for choosing [facility] for  "your care. Our goal is always to provide you with excellent care. Hearing back from our patients is one way we can continue to improve our services. Please take a few minutes to complete the written survey that you may receive in the mail after you visit with us. Thank you!        [employer] Information     [employer] lets you send messages to your doctor, view your test results, renew your prescriptions, schedule appointments and more. To sign up, go to [email or web address] . Click on \"Log in\" on the left side of the screen, which will take you to the Welcome page. Then click on \"Sign up Now\" on the right side of the page.     You will be asked to enter the access code listed below, as well as some personal information. Please follow the directions to create your username and password.     Your access code is: [email or web address]  Expires: 3/26/2018  2:45 PM     Your access code will  in 90 days. If you need help or a new code, please call your [place] clinic or [phone].        Care EveryWhere ID     This is your Care EveryWhere ID. This could be used by other organizations to access your [facility] medical records  [id number]        Equal Access to Services     LOGAN SON : Jeaneth Ledesma, regino alexander, rafaela merida, matthias drake. So [facility] [phone].    ATENCIÓN: Si habla español, tiene a cisse disposición servicios gratuitos de asistencia lingüística. Dignaame al [phone].    We comply with applicable federal civil rights laws and [place] laws. We do not discriminate on the basis of race, color, national origin, age, disability, sex, sexual orientation, or gender identity.            After Visit Summary       This is your record. Keep this with you and show to your community pharmacist(s) and doctor(s) at your next visit.                  "

## 2018-01-07 NOTE — DISCHARGE INSTRUCTIONS
Hyperemesis Gravidarum  Hyperemesis gravidarum is a severe form of morning sickness that can affect some women during pregnancy. It may develop around the 5th week and last until the 16th week of pregnancy. In some women, it may last longer. Symptoms include severe nausea and vomiting. This can lead to problems such as as weight loss and dehydration.  It is not clear what causes hyperemesis gravidarum. It may be due to rising hormone levels early in the pregnancy. It can be a serious threat to mother and fetus, if symptoms are severe. Therefore, follow the advice below carefully. If symptoms are not controlled by home measures, a hospital stay may be needed. IV (intravenous) fluids and medicines may be given.  Home care    Diet    Keep a log of the foods you eat and how they affect your symptoms. Avoid foods that trigger your symptoms.    Eat frequent small meals throughout the day rather than three large meals. This can help keep the stomach from being empty, which can make nausea worse.    Choose foods that are high in carbohydrates. Eating foods high in protein may also help. Limit greasy or spicy foods.    Before getting out of bed in the morning, try eating crackers or dry toast. This may help settle your stomach.    Drink cold, clear liquids. Drinking small amounts of liquids with electrolytes, such as sports drinks may help as well.    Medicine    If needed, your healthcare provider may prescribe certain medicines to help relieve nausea and vomiting. Vitamin B6 and juan daniel may also be advised. Don t try any over-the-counter medicines or home remedies without talking to your provider first.  Follow-up care  Follow up with your healthcare provider, or as advised.  When to seek medical advice  Call your healthcare provider right away if any of these occur:    Signs of dehydration (dry mouth, extreme thirst, dark urine or little urine output, dizziness, weakness, or fainting)    Vomiting that won t  stop    Inability to keep down liquids    Frequent diarrhea    Weight loss or no weight gain over a 2-week period    Severe constant pain in the lower right abdomen    Fever of 100.4 F (38 C) or higher, or as directed by your provider  Date Last Reviewed: 8/20/2015 2000-2017 [employer]. [address] [place] [id number]. All rights reserved. This information is not intended as a substitute for professional medical care. Always follow your healthcare professional's instructions.

## 2018-01-07 NOTE — ED NOTES
Patient presents to ER again for nausea and vomiting with pregnancy. Patient was seen on the 26th for issue. ABC's intact.

## 2018-01-07 NOTE — ED PROVIDER NOTES
History     Chief Complaint:  Emesis during pregnancy    HPI   Lorna Kenyon is a 6 week pregnant 32 year who presents to the emergency department for evaluation of emesis during pregnancy. For the past two weeks, the patient reports nausea and vomiting secondary to pregnancy. The patient reports this is her 6th pregnancy and has had difficulty with morning sickness the past two pregnancies. She reports being seen here in the emergency department on 12/26/17 for this, where she received Reglan and benadryl with relief. She was also prescribed compazine, which she reports taking as directed. While on this, she reports improvement in the emesis. However, once she finished this, she reports the return of her symptoms. She states she has approximately 6 episodes of vomiting a day. She states she saw her OB in clinic on 1/2/17 but notes her nausea and vomiting was not addressed during this visit. Since then, the continuation of her symptoms along with the increasing dehydration prompted the patient to seek evaluation here in the emergency department.    12/26/17  OB 1st trimester w transvag:  Single live intrauterine pregnancy, 6 weeks 6 days +/-1  week gestational age. As per radiology.    Allergies:  NKDA     Medications:    Compazine  unisom  Pryidoxine  Norco  Percocet  Docusate sodium    Past Medical History:    The patient is not currently taking any prescribed medications.    Past Surgical History:    cholecystectomy    Family History:    No past pertinent family history.    Social History:  Negative for tobacco use.  Negative for alcohol use.  Marital Status:        Review of Systems   Gastrointestinal: Positive for nausea and vomiting.   All other systems reviewed and are negative.    Physical Exam   First Vitals:  BP: 125/60  Pulse: 80  Temp: 98.4  F (36.9  C)  Resp: 20  SpO2: 100 %      Physical Exam  Constitutional:  Appears well-developed and well-nourished. Alert. Conversant through Monegasque  , although think she understands English quite well. Non toxic.  HENT:   Head: Atraumatic.   Nose: Nose normal.  Mouth/Throat: Oral mucosa is clear and moist. no trismus. Pharynx normal. Tonsils symmetric. No tonsillar enlargement, erythema, or exudate.  Eyes: Conjunctivae normal. EOM normal. Pupils equal, round, and reactive to light. No scleral icterus.   Neck: Normal range of motion. Neck supple. No tracheal deviation present.   Cardiovascular: Normal rate, regular rhythm. No gallop. No friction rub. No murmur heard. Symmetric radial artery pulses   Pulmonary/Chest: Effort normal. No stridor. No respiratory distress. No wheezes. No rales. No rhonchi .  Abdominal: Soft. Bowel sounds normal. No distension. No mass. No tenderness. No rebound. No guarding.   Musculoskeletal:   RUE: Normal range of motion. No tenderness. No deformity  LUE: Normal range of motion. No tenderness. No deformity  RLE: Normal range of motion. No edema. No tenderness. No deformity  LLE: Normal range of motion. No edema. No tenderness. No deformity  Neurological: Alert and oriented to person, place, and time. Normal strength. CN II-VII intact. No sensory deficit. GCS eye subscore is 4. GCS verbal subscore is 5. GCS motor subscore is 6. Normal coordination   Skin: Skin is warm and dry. No rash noted. No pallor. Normal capillary refill.  Psychiatric:  Normal mood. Normal affect.     Emergency Department Course   Laboratory:  CBC: WBC: 5.7, HGB: 14.4, PLT: 213  BMP:  Creatinine 0.45 (L), o/w WNL     Interventions:  1605 NS 1L IV   Reglan 10 mg IV   Benadryl 25 mg IV    Emergency Department Course:  1700 Nursing notes and vitals reviewed.  I performed an exam of the patient as documented above.     IV inserted. Medicine administered as documented above. Blood drawn. This was sent to the lab for further testing, results above.    1733 I rechecked the patient and discussed the results of their workup thus far.     The patient tolerated  PO challenge without difficulty here in the emergency department.     Findings and plan explained to the Patient. Patient discharged home with instructions regarding supportive care, medications, and reasons to return. The importance of close follow-up was reviewed.     I personally reviewed the laboratory results with the Patient and answered all related questions prior to discharge.     Impression & Plan    Medical Decision Making:  Lorna Kenyon is a 32 year old female who presents for evaluation of nausea and vomiting.  She is currently 6 weeks pregnant.  Her symptoms are similar to hyperemesis gravidarum, which she is experienced with her 2 prior recent pregnancies as well.  Nonetheless, I considered a broad differential diagnosis for this patient including viral gastroenteritis, food poisoning, bowel obstruction, intra-abdominal infection such as traction, viral gastroenteritis, colitis, cholecystitis, appendicitis, etc. no urinary symptoms.  No pelvic cramping or vaginal bleeding to suggest ectopic pregnancy or miscarriage at this time.  There are no signs of worrisome intra-abdominal pathologies detected during the visit today.  The patient has a completely benign abdominal exam without rebound, guarding, or marked tenderness to palpation.  I doubt ectopic pregnancy based on OB ultrasound performed on 12/26/17, results as above.      She is feeling much better after intravenous Reglan and Benadryl.  She is also received a liter of fluid.  She has been dilatory in the hallway to the bathroom, is producing urine.  Laboratory workup is reassuring.    Supportive outpatient management is therefore indicated.  Abdominal pain precautions are given for home.    It was discussed with the patient to return to the ED for blood in stool, increasing pain, or fevers more than 102.   Feels much improved after interventions in ED.  See above for medications for home.  I believe all of her symptoms are at this point  explained by the pregnancy and hyperemesis gravidarum.  I also counseled her to follow up with your OB office, who is in [place], for outpatient management.  They may be of help assist her with medications that she can use at home and/or set up home fluid infusions if necessary.  She is invited to return to the ER if any concerns develop    Diagnosis:    ICD-10-CM    1. Hyperemesis gravidarum O21.0        Disposition:  discharged to home    I, Emma Haley, am serving as a scribe on 1/7/2018 at 4:51 PM to personally document services performed by Chris Crowder MD based on my observations and the provider's statements to me.     Emma Haley  1/7/2018   [facility] EMERGENCY DEPARTMENT       Chris Crowder MD  01/08/18 0814

## 2018-01-07 NOTE — ED AVS SNAPSHOT
[facility] Emergency Department    [address]    [facility] [address]    Phone:  [phone]    Fax:  [phone]                                       Lorna Kenyon   MRN: [id number]    Department:  [facility] Emergency Department   Date of Visit:  1/7/2018           After Visit Summary Signature Page     I have received my discharge instructions, and my questions have been answered. I have discussed any challenges I see with this plan with the nurse or doctor.    ..........................................................................................................................................  Patient/Patient Representative Signature      ..........................................................................................................................................  Patient Representative Print Name and Relationship to Patient    ..................................................               ................................................  Date                                            Time    ..........................................................................................................................................  Reviewed by Signature/Title    ...................................................              ..............................................  Date                                                            Time

## 2018-01-17 ENCOUNTER — HOME INFUSION (PRE-WILLOW HOME INFUSION) (OUTPATIENT)
Dept: PHARMACY | Facility: CLINIC | Age: 33
End: 2018-01-17

## 2018-01-17 LAB
C TRACH DNA SPEC QL PROBE+SIG AMP: NEGATIVE
HBV SURFACE AG SERPL QL IA: NEGATIVE
HIV 1+2 AB+HIV1 P24 AG SERPL QL IA: NON REACTIVE
HPV ABSTRACT: NORMAL
N GONORRHOEA DNA SPEC QL PROBE+SIG AMP: NEGATIVE
PAP-ABSTRACT: NORMAL
RUBELLA ABY IGG: 3.53
RUBELLA ANTIBODY IGG QUANTITATIVE: NORMAL IU/ML
SPECIMEN DESCRIP: NORMAL
SPECIMEN DESCRIPTION: NORMAL
T PALLIDUM IGG SER QL: NON REACTIVE

## 2018-01-18 ENCOUNTER — HOME INFUSION (PRE-WILLOW HOME INFUSION) (OUTPATIENT)
Dept: PHARMACY | Facility: CLINIC | Age: 33
End: 2018-01-18

## 2018-01-18 NOTE — PROGRESS NOTES
This is a recent snapshot of the patient's [facility] medical record.  For current drug dose and complete information and questions, call [phone] or In Basket pool, fv home infusion ([id number])  CSN Number:  [id number]

## 2018-01-19 ENCOUNTER — HOME INFUSION (PRE-WILLOW HOME INFUSION) (OUTPATIENT)
Dept: PHARMACY | Facility: CLINIC | Age: 33
End: 2018-01-19

## 2018-01-20 ENCOUNTER — HOME INFUSION (PRE-WILLOW HOME INFUSION) (OUTPATIENT)
Dept: PHARMACY | Facility: CLINIC | Age: 33
End: 2018-01-20

## 2018-01-23 ENCOUNTER — HOME INFUSION (PRE-WILLOW HOME INFUSION) (OUTPATIENT)
Dept: PHARMACY | Facility: CLINIC | Age: 33
End: 2018-01-23

## 2018-01-23 NOTE — PROGRESS NOTES
This is a recent snapshot of the patient's [facility] medical record.  For current drug dose and complete information and questions, call [phone] or In Basket pool, fv home infusion ([id number])  CSN Number:  [id number]

## 2018-01-24 NOTE — PROGRESS NOTES
This is a recent snapshot of the patient's [facility] medical record.  For current drug dose and complete information and questions, call [phone] or In Basket pool, fv home infusion ([id number])  CSN Number:  [id number]

## 2018-01-24 NOTE — PROGRESS NOTES
This is a recent snapshot of the patient's [facility] medical record.  For current drug dose and complete information and questions, call [phone] or In Basket pool, fv home infusion ([id number])  CSN Number:  [id number]

## 2018-01-26 ENCOUNTER — HOME INFUSION (PRE-WILLOW HOME INFUSION) (OUTPATIENT)
Dept: PHARMACY | Facility: CLINIC | Age: 33
End: 2018-01-26

## 2018-01-29 NOTE — PROGRESS NOTES
This is a recent snapshot of the patient's [facility] medical record.  For current drug dose and complete information and questions, call [phone] or In Basket pool, fv home infusion ([id number])  CSN Number:  [id number]

## 2018-01-30 ENCOUNTER — HOME INFUSION (PRE-WILLOW HOME INFUSION) (OUTPATIENT)
Dept: PHARMACY | Facility: CLINIC | Age: 33
End: 2018-01-30

## 2018-01-31 ENCOUNTER — HOME INFUSION (PRE-WILLOW HOME INFUSION) (OUTPATIENT)
Dept: PHARMACY | Facility: CLINIC | Age: 33
End: 2018-01-31

## 2018-01-31 NOTE — PROGRESS NOTES
This is a recent snapshot of the patient's [facility] medical record.  For current drug dose and complete information and questions, call [phone] or In Basket pool, fv home infusion ([id number])  CSN Number:  [id number]

## 2018-02-01 ENCOUNTER — HOME INFUSION (PRE-WILLOW HOME INFUSION) (OUTPATIENT)
Dept: PHARMACY | Facility: CLINIC | Age: 33
End: 2018-02-01

## 2018-02-02 NOTE — PROGRESS NOTES
This is a recent snapshot of the patient's [facility] medical record.  For current drug dose and complete information and questions, call [phone] or In Basket pool, fv home infusion ([id number])  CSN Number:  [id number]

## 2018-02-03 ENCOUNTER — HOME INFUSION (PRE-WILLOW HOME INFUSION) (OUTPATIENT)
Dept: PHARMACY | Facility: CLINIC | Age: 33
End: 2018-02-03

## 2018-02-05 ENCOUNTER — HOME INFUSION (PRE-WILLOW HOME INFUSION) (OUTPATIENT)
Dept: PHARMACY | Facility: CLINIC | Age: 33
End: 2018-02-05

## 2018-02-06 ENCOUNTER — HOME INFUSION (PRE-WILLOW HOME INFUSION) (OUTPATIENT)
Dept: PHARMACY | Facility: CLINIC | Age: 33
End: 2018-02-06

## 2018-02-06 NOTE — PROGRESS NOTES
This is a recent snapshot of the patient's [facility] medical record.  For current drug dose and complete information and questions, call [phone] or In Basket pool, fv home infusion ([id number])  CSN Number:  [id number]

## 2018-02-06 NOTE — PROGRESS NOTES
This is a recent snapshot of the patient's [facility] medical record.  For current drug dose and complete information and questions, call [phone] or In [facility] pool, fv home infusion ([id number])  CSN Number:  [id number]

## 2018-02-07 NOTE — PROGRESS NOTES
This is a recent snapshot of the patient's [facility] medical record.  For current drug dose and complete information and questions, call [phone] or In Basket pool, fv home infusion ([id number])  CSN Number:  [id number]

## 2018-02-10 ENCOUNTER — HOME INFUSION (PRE-WILLOW HOME INFUSION) (OUTPATIENT)
Dept: PHARMACY | Facility: CLINIC | Age: 33
End: 2018-02-10

## 2018-02-12 NOTE — PROGRESS NOTES
This is a recent snapshot of the patient's [facility] medical record.  For current drug dose and complete information and questions, call [phone] or In Basket pool, fv home infusion ([id number])  CSN Number:  [id number]

## 2018-02-13 ENCOUNTER — HOME INFUSION (PRE-WILLOW HOME INFUSION) (OUTPATIENT)
Dept: PHARMACY | Facility: CLINIC | Age: 33
End: 2018-02-13

## 2018-02-14 NOTE — PROGRESS NOTES
This is a recent snapshot of the patient's [facility] medical record.  For current drug dose and complete information and questions, call [phone] or In Basket pool, fv home infusion ([id number])  CSN Number:  [id number]

## 2018-02-18 ENCOUNTER — HOME INFUSION (PRE-WILLOW HOME INFUSION) (OUTPATIENT)
Dept: PHARMACY | Facility: CLINIC | Age: 33
End: 2018-02-18

## 2018-02-19 ENCOUNTER — HOME INFUSION (PRE-WILLOW HOME INFUSION) (OUTPATIENT)
Dept: PHARMACY | Facility: CLINIC | Age: 33
End: 2018-02-19

## 2018-02-19 NOTE — PROGRESS NOTES
This is a recent snapshot of the patient's [facility] medical record.  For current drug dose and complete information and questions, call [phone] or In Basket pool, fv home infusion ([id number])  CSN Number:  [id number]

## 2018-02-20 NOTE — PROGRESS NOTES
This is a recent snapshot of the patient's [facility] medical record.  For current drug dose and complete information and questions, call [phone] or In Basket pool, fv home infusion ([id number])  CSN Number:  [id number]

## 2018-02-22 ENCOUNTER — HOME INFUSION (PRE-WILLOW HOME INFUSION) (OUTPATIENT)
Dept: PHARMACY | Facility: CLINIC | Age: 33
End: 2018-02-22

## 2018-02-23 NOTE — PROGRESS NOTES
This is a recent snapshot of the patient's [facility] medical record.  For current drug dose and complete information and questions, call [phone] or In Basket pool, fv home infusion ([id number])  CSN Number:  [id number]

## 2018-02-24 ENCOUNTER — APPOINTMENT (OUTPATIENT)
Dept: ULTRASOUND IMAGING | Facility: CLINIC | Age: 33
End: 2018-02-24
Attending: EMERGENCY MEDICINE
Payer: COMMERCIAL

## 2018-02-24 ENCOUNTER — HOSPITAL ENCOUNTER (EMERGENCY)
Facility: CLINIC | Age: 33
Discharge: HOME | End: 2018-02-24
Attending: EMERGENCY MEDICINE | Admitting: EMERGENCY MEDICINE
Payer: COMMERCIAL

## 2018-02-24 VITALS
SYSTOLIC BLOOD PRESSURE: 111 MMHG | DIASTOLIC BLOOD PRESSURE: 74 MMHG | TEMPERATURE: 98.2 F | OXYGEN SATURATION: 99 % | RESPIRATION RATE: 16 BRPM | HEART RATE: 100 BPM

## 2018-02-24 DIAGNOSIS — O44.01 PLACENTA PREVIA IN FIRST TRIMESTER: ICD-10-CM

## 2018-02-24 DIAGNOSIS — N30.90 BLADDER INFECTION: ICD-10-CM

## 2018-02-24 DIAGNOSIS — O20.0 THREATENED ABORTION: ICD-10-CM

## 2018-02-24 LAB
ALBUMIN UR-MCNC: 30 MG/DL
APPEARANCE UR: ABNORMAL
B-HCG SERPL-ACNC: ABNORMAL IU/L (ref 0–5)
BACTERIA #/AREA URNS HPF: ABNORMAL /HPF
BILIRUB UR QL STRIP: NEGATIVE
COLOR UR AUTO: YELLOW
GLUCOSE UR STRIP-MCNC: NEGATIVE MG/DL
HGB UR QL STRIP: ABNORMAL
KETONES UR STRIP-MCNC: NEGATIVE MG/DL
LEUKOCYTE ESTERASE UR QL STRIP: ABNORMAL
MUCOUS THREADS #/AREA URNS LPF: PRESENT /LPF
NITRATE UR QL: NEGATIVE
PH UR STRIP: 7 PH (ref 5–7)
RBC #/AREA URNS AUTO: 17 /HPF (ref 0–2)
SOURCE: ABNORMAL
SP GR UR STRIP: 1 (ref 1–1.03)
SQUAMOUS #/AREA URNS AUTO: 6 /HPF (ref 0–1)
UROBILINOGEN UR STRIP-MCNC: 0 MG/DL (ref 0–2)
WBC #/AREA URNS AUTO: >182 /HPF (ref 0–2)
WBC CLUMPS #/AREA URNS HPF: PRESENT /HPF

## 2018-02-24 PROCEDURE — 99284 EMERGENCY DEPT VISIT MOD MDM: CPT | Mod: 25

## 2018-02-24 PROCEDURE — 81001 URINALYSIS AUTO W/SCOPE: CPT | Performed by: EMERGENCY MEDICINE

## 2018-02-24 PROCEDURE — 76815 OB US LIMITED FETUS(S): CPT

## 2018-02-24 PROCEDURE — 36415 COLL VENOUS BLD VENIPUNCTURE: CPT | Performed by: EMERGENCY MEDICINE

## 2018-02-24 PROCEDURE — 84702 CHORIONIC GONADOTROPIN TEST: CPT | Performed by: EMERGENCY MEDICINE

## 2018-02-24 RX ORDER — CEPHALEXIN 500 MG/1
500 CAPSULE ORAL 4 TIMES DAILY
Qty: 20 CAPSULE | Refills: 0 | Status: SHIPPED | OUTPATIENT
Start: 2018-02-24 | End: 2018-02-26

## 2018-02-24 NOTE — ED AVS SNAPSHOT
[facility] Emergency Department    [address] [place] [address]    Phone:  [phone]    Fax:  [phone]                                       Lorna Kenyon   MRN: [id number]    Department:  [facility] Emergency Department   Date of Visit:  2018           Patient Information     Date Of Birth          1985        Your diagnoses for this visit were:     Threatened      Bladder infection     Placenta previa in first trimester        You were seen by Rogelio Sutton MD.      Follow-up Information     Follow up with Walter Min DO In 2 days.    Specialties:  Family Practice, Urgent Care    Contact information:    [address] [facility] [address]  [phone]          Discharge Instructions         Possible Miscarriage (Threatened )  You may be having a miscarriage.  Common signs of a miscarriage are pain and bleeding. A small amount of bleeding can be normal during the first 3 months of pregnancy. Often the pain and bleeding stop, and you have a normal pregnancy and baby. But heavy bleeding or severe cramping can be an early sign of miscarriage. A miscarriage means an unexpected loss of your pregnancy.  At this time, your healthcare provider doesn t know whether you will have a miscarriage, or if things will clear up and your pregnancy will continue normally. This can be emotionally difficult. There is little that can be done to change the way you feel. But understand that miscarriages are common.  About 1 or 2 out of every 10 pregnancies end this way. Some even end before you know you are pregnant. This happens for a number of reasons, and usually the cause is never known. It s important you know that it is not your fault. It didn t happen because you did anything wrong.  Having sex or exercising does not cause a miscarriage. These activities are usually safe unless you have pain or bleeding or your doctor tells you to stop.  Even minor falls won t cause a miscarriage. Miscarriages happen because things were not developing as they were supposed to. No medicine can prevent a miscarriage.  Again, understand that things are uncertain right now. You may still have some bleeding. This may be light spotting or like a period, and you may pass some tissue. You may have some cramping. This is why follow-up care is important.  Home care  To improve the chance of keeping your pregnancy, you should take these steps:    Rest in bed until the pain and bleeding stop.    Don t have sex until your healthcare provider says it s OK.    Use sanitary napkins instead of tampons.    Don t douche.    Don t take aspirin, ibuprofen, or naproxen.    Don t have alcoholic or caffeinated beverages or smoke.  Follow-up care  Make an appointment with your doctor within the next week, or as directed.  If you had an ultrasound, a radiologist will review it. You will be told of any new findings that may affect your care.  Call 911  Call 911 if you have:    Severe pain and very heavy bleeding    Severe lightheadedness, passing out, or fainting    Rapid heart rate    Difficulty breathing    Confusion or difficulty waking up  When to seek medical advice  Call your healthcare provider right away if any of these occur:    Vaginal bleeding or pain that lasts for more than 3 days    Heavy bleeding. This means soaking 1 new pad an hour over 3 hours.    Fever of 100.4 F (38 C) or higher, or as directed by your healthcare provider    Pain in your lower belly (abdomen) that gets worse    Weakness or dizziness    Passage of anything that resembles tissue. This would be pink or grayish membrane or solid material. Save the tissue in a clean container and bring it to your provider.  Date Last Reviewed: 9/1/2016 2000-2017 [employer]. [address] [place] [address], [place] [address]. All rights reserved. This information is not intended as a substitute for professional medical  care. Always follow your healthcare professional's instructions.          Understanding Urinary Tract Infections (UTIs)  Most UTIs are caused by bacteria, although they may also be caused by viruses or fungi. Bacteria from the bowel are the most common source of infection. The infection may start because of any of the following:    Sexual activity. During sex, bacteria can travel from the penis, vagina, or rectum into the urethra.     Bacteria on the skin outside the rectum may travel into the urethra. This is more common in women since the rectum and urethra are closer to each other than in men. Wiping from front to back after using the toilet and keeping the area clean can help prevent germs from getting to the urethra.    Blockage of urine flow through the urinary tract. If urine sits too long, germs may start to grow out of control.      Parts of the urinary tract  The infection can occur in any part of the urinary tract.    The kidneys collect and store urine.    The ureters carry urine from the kidneys to the bladder.    The bladder holds urine until you are ready to let it out.    The urethra carries urine from the bladder out of the body. It is shorter in women, so bacteria can move through it more easily. The urethra is longer in men, so a UTI is less likely to reach the bladder or kidneys in men.  Date Last Reviewed: 1/1/2017 2000-2017 [employer]. [address] [place] [address]. All rights reserved. This information is not intended as a substitute for professional medical care. Always follow your healthcare professional's instructions.          Discharge References/Attachments     PLACENTA PREVIA (ENGLISH)      24 Hour Appointment Hotline       To make an appointment at any [facility], call [phone] ([phone]). If you don't have a family doctor or clinic, we will help you find one. [facility] are conveniently located to serve the needs of you and your  family.             Review of your medicines      START taking        Dose / Directions Last dose taken    cephALEXin 500 MG capsule   Commonly known as:  KEFLEX   Dose:  500 mg   Quantity:  20 capsule        Take 1 capsule (500 mg) by mouth 4 times daily for 5 days   Refills:  0          Our records show that you are taking the medicines listed below. If these are incorrect, please call your family doctor or clinic.        Dose / Directions Last dose taken    B-6 50 MG Tabs   Dose:  1 tablet   Quantity:  30 tablet        Take 1 tablet by mouth At Bedtime   Refills:  0        docusate sodium 100 MG tablet   Commonly known as:  COLACE   Dose:  100 mg   Quantity:  30 tablet        Take 100 mg by mouth daily (while using narcotics)   Refills:  1        doxylamine 25 MG Tabs tablet   Commonly known as:  UNISOM   Dose:  25 mg   Quantity:  30 each        Take 1 tablet (25 mg) by mouth At Bedtime   Refills:  0        HYDROcodone-acetaminophen 5-325 MG per tablet   Commonly known as:  NORCO   Dose:  1 tablet   Quantity:  20 tablet        Take 1 tablet by mouth every 6 hours as needed for moderate to severe pain   Refills:  0        ibuprofen 600 MG tablet   Commonly known as:  ADVIL/MOTRIN   Dose:  600 mg   Quantity:  90 tablet        Take 1 tablet (600 mg) by mouth every 6 hours as needed for moderate pain   Refills:  1        * oxyCODONE-acetaminophen 5-325 MG per tablet   Commonly known as:  PERCOCET   Dose:  1-2 tablet   Quantity:  30 tablet        Take 1-2 tablets by mouth every 6 hours as needed for pain   Refills:  0        * oxyCODONE-acetaminophen 5-325 MG per tablet   Commonly known as:  PERCOCET   Dose:  1-2 tablet   Quantity:  15 tablet        Take 1-2 tablets by mouth every 4 hours as needed for pain (moderate to severe)   Refills:  0        prochlorperazine 10 MG tablet   Commonly known as:  COMPAZINE   Dose:  10 mg   Quantity:  20 tablet        Take 1 tablet (10 mg) by mouth every 6 hours as needed for nausea  or vomiting   Refills:  0        * Notice:  This list has 2 medication(s) that are the same as other medications prescribed for you. Read the directions carefully, and ask your doctor or other care provider to review them with you.            Prescriptions were sent or printed at these locations (1 Prescription)                   Other Prescriptions                Printed at Department/Unit printer (1 of 1)         cephALEXin (KEFLEX) 500 MG capsule                Procedures and tests performed during your visit     HCG QUANTitative pregnancy (blood)    UA with Microscopic    US OB Limited One Or More Fetuses      Orders Needing Specimen Collection     None      Pending Results     Date and Time Order Name Status Description    2/24/2018 1618 US OB Limited One Or More Fetuses Preliminary             Pending Culture Results     No orders found from 2/22/2018 to 2/25/2018.            Pending Results Instructions     If you had any lab results that were not finalized at the time of your Discharge, you can call the ED Lab Result RN at [phone]. You will be contacted by this team for any positive Lab results or changes in treatment. The nurses are available 7 days a week from 10A to 6:30P.  You can leave a message 24 hours per day and they will return your call.        Test Results From Your Hospital Stay              2/24/2018  5:55 PM      Component Results     Component Value Ref Range & Units Status    HCG Quantitative Serum 34488 (H) 0 - 5 IU/L Final    Specimen run with a dilution         2/24/2018  5:29 PM      Component Results     Component Value Ref Range & Units Status    Color Urine Yellow  Final    Appearance Urine Cloudy  Final    Glucose Urine Negative NEG^Negative mg/dL Final    Bilirubin Urine Negative NEG^Negative Final    Ketones Urine Negative NEG^Negative mg/dL Final    Specific Gravity Urine 1.002 (L) 1.003 - 1.035 Final    Blood Urine Large (A) NEG^Negative Final    pH Urine 7.0 5.0 - 7.0 pH  Final    Protein Albumin Urine 30 (A) NEG^Negative mg/dL Final    Urobilinogen mg/dL 0.0 0.0 - 2.0 mg/dL Final    Nitrite Urine Negative NEG^Negative Final    Leukocyte Esterase Urine Large (A) NEG^Negative Final    Source Midstream Urine  Final    WBC Urine >182 (H) 0 - 2 /HPF Final    RBC Urine 17 (H) 0 - 2 /HPF Final    WBC Clumps Present (A) NEG^Negative /HPF Final    Bacteria Urine Moderate (A) NEG^Negative /HPF Final    Squamous Epithelial /HPF Urine 6 (H) 0 - 1 /HPF Final    Mucous Urine Present (A) NEG^Negative /LPF Final         2/24/2018  5:55 PM      Narrative     US OBSTETRIC LIMITED ONE OR MORE FETUSES  2/24/2018 5:31 PM    HISTORY: Bleeding.     COMPARISON: OB ultrasound dated 12/26/2017.    FINDINGS:     Presentation: Transverse.  Cardiac activity: 150 bpm. Regular rhythm.  Movement: Unremarkable.  Placenta: Posterior with complete placenta previa, likely partially  due to early gestational age. This should be evaluated later in  gestation (20 weeks).  Adnexa: Not visualized.   Cervical length: Not well seen due to early gestational age.   Amniotic fluid: Unremarkable. JOS: 9.8 cm.    Other findings: None.  A complete anatomy scan was not performed.     Measured parameters:       BPD:  2.9 cm      Age: 15 weeks 3 days.       HC:    11.5 cm      Age: 15 weeks 5 days.       AC:  8.8 cm      Age: 15 weeks 1 day.       FL:   1.8 cm      Age: 15 weeks 2 days.    Gestational age by current ultrasound measurement: 15 weeks 4 days,  corresponding to an EDE of 8/14/2018.    Gestational age based on the reported previously established due date:  15 weeks 3 days, corresponding to an EDE of 8/15/2018.    Estimated fetal weight: 118 grams, corresponding to the 69th  percentile based on the reported previously established due date.         Impression     IMPRESSION:    1. Single live intrauterine pregnancy of 15 weeks 4 days gestation by  current ultrasound measurement. Fetal growth is 1 days more advanced  than  what is expected from the reported previously established due  date.  2. Complete placenta previa is noted on today's study which may be  partially due to the early gestational age. I recommend follow-up  evaluation of the internal os at 20 weeks gestation at the time of the  anatomic survey.  3. Otherwise unremarkable limited obstetric ultrasound. No significant  subchorionic hemorrhage is identified. No evidence for placental  abruption is seen.                   Clinical Quality Measure: Blood Pressure Screening     Your blood pressure was checked while you were in the emergency department today. The last reading we obtained was  BP: 111/74 . Please read the guidelines below about what these numbers mean and what you should do about them.  If your systolic blood pressure (the top number) is less than 120 and your diastolic blood pressure (the bottom number) is less than 80, then your blood pressure is normal. There is nothing more that you need to do about it.  If your systolic blood pressure (the top number) is 120-139 or your diastolic blood pressure (the bottom number) is 80-89, your blood pressure may be higher than it should be. You should have your blood pressure rechecked within a year by a primary care provider.  If your systolic blood pressure (the top number) is 140 or greater or your diastolic blood pressure (the bottom number) is 90 or greater, you may have high blood pressure. High blood pressure is treatable, but if left untreated over time it can put you at risk for heart attack, stroke, or kidney failure. You should have your blood pressure rechecked by a primary care provider within the next 4 weeks.  If your provider in the emergency department today gave you specific instructions to follow-up with your doctor or provider even sooner than that, you should follow that instruction and not wait for up to 4 weeks for your follow-up visit.        Thank you for choosing [facility]       Thank you for  "choosing [facility] for your care. Our goal is always to provide you with excellent care. Hearing back from our patients is one way we can continue to improve our services. Please take a few minutes to complete the written survey that you may receive in the mail after you visit with us. Thank you!        [employer] Information     [email or web address] lets you send messages to your doctor, view your test results, renew your prescriptions, schedule appointments and more. To sign up, go to [email or web address] . Click on \"Log in\" on the left side of the screen, which will take you to the Welcome page. Then click on \"Sign up Now\" on the right side of the page.     You will be asked to enter the access code listed below, as well as some personal information. Please follow the directions to create your username and password.     Your access code is: [email or web address]  Expires: 3/26/2018  2:45 PM     Your access code will  in 90 days. If you need help or a new code, please call your [facility] clinic or [phone].        Care EveryWhere ID     This is your Care EveryWhere ID. This could be used by other organizations to access your [facility] medical records  [id number]        Equal Access to Services     LOGAN SON : Jeaneth Ledesma, regino alexander, qaja merida, matthias drake. So [facility] [phone].    ATENCIÓN: Si habla español, tiene a cisse disposición servicios gratuitos de asistencia lingüística. Llame al [phone].    We comply with applicable federal civil rights laws and [place] laws. We do not discriminate on the basis of race, color, national origin, age, disability, sex, sexual orientation, or gender identity.            After Visit Summary       This is your record. Keep this with you and show to your community pharmacist(s) and doctor(s) at your next visit.                  "

## 2018-02-24 NOTE — ED NOTES
Pt reports pain and vaginal bleeding since 5 pm yesterday. Pt is pregnant, due date August 15th. Pt states she feels the urge to push.

## 2018-02-24 NOTE — ED PROVIDER NOTES
History     Chief Complaint:    Threatened Miscarriage      HPI   Lorna Kenyon is a 32 year old female who presents with 6th pregnancy 5 living children with about 3-4 months preg with 4 times of vaginal spotting since 330pm this afternoon.  Not large bleeding or clots.  No tissues.  She states she may feel the urge to push.   phone utilized.  On home infusion for hyperemesis.     Allergies:    No Known Allergies     Medications:        cephALEXin (KEFLEX) 500 MG capsule   prochlorperazine (COMPAZINE) 10 MG tablet   doxylamine (UNISOM) 25 MG TABS tablet   Pyridoxine HCl (B-6) 50 MG TABS   HYDROcodone-acetaminophen (NORCO) 5-325 MG per tablet   ibuprofen (ADVIL,MOTRIN) 600 MG tablet   oxyCODONE-acetaminophen (PERCOCET) 5-325 MG per tablet   oxyCODONE-acetaminophen (PERCOCET) 5-325 MG per tablet   docusate sodium 100 MG tablet       Problem List:      There are no active problems to display for this patient.       Past Medical History:      No past medical history on file.    Past Surgical History:      Past Surgical History:   Procedure Laterality Date     LAPAROSCOPIC CHOLECYSTECTOMY  8/12/2014    Procedure: LAPAROSCOPIC CHOLECYSTECTOMY;  Surgeon: Camacho Jeronimo MD;  Location:  OR       Family History:      No family history on file.    Social History:    Marital Status:   [2]  Social History   Substance Use Topics     Smoking status: Never Smoker     Smokeless tobacco: Never Used     Alcohol use No        Review of Systems  All other review neg.  No abd pain no bruising or bleeding.     Physical Exam   First Vitals:  BP: 111/74  Pulse: 100  Temp: 98.2  F (36.8  C)  Resp: 16  SpO2: 99 %      Physical Exam  Constitutional: Patient is well appearing. No distress.  Head: Atraumatic.  Mouth/Throat: Oropharynx is clear and moist. No oropharyngeal exudate.  Eyes: Conjunctivae and EOM are normal. No scleral icterus.  Neck: Normal range of motion. Neck supple.   Cardiovascular: Normal  rate, regular rhythm, normal heart sounds and intact distal pulses.   Pulmonary/Chest: Breath sounds normal. No respiratory distress.  Abdominal: Soft. Bowel sounds are normal. No distension. No tenderness. No rebound or guarding.   Musculoskeletal: Normal range of motion. No edema or tenderness.   Neurological: Alert and orientated to person, place, and time. No observable focal neuro deficit  Skin: Warm and dry. No rash noted. Not diaphoretic.   Pt defers vaginal exam understands risks.     Emergency Department Course   Per reports and reviewed.    Impression & Plan      Medical Decision Making:  Threatened ab.  Discussed unfortunately nothing medicine has to offer this in early pregnancy and strict return and F/u instructions.  Secondary coverage empiric in 1st trimester possibly uti.  Recent research and change in practice no need for Rh testing in 1st.      Diagnosis:    ICD-10-CM    1. Threatened  O20.0    2. Bladder infection N30.90    3. Placenta previa in first trimester O44.01        Disposition:  discharged to home    Discharge Medications:  New Prescriptions    CEPHALEXIN (KEFLEX) 500 MG CAPSULE    Take 1 capsule (500 mg) by mouth 4 times daily for 5 days         Rogelio Sutton  2018   [facility] EMERGENCY DEPARTMENT       Rogelio Sutton MD  [phone]       Rogelio Sutton MD  [phone]

## 2018-02-24 NOTE — ED AVS SNAPSHOT
[facility] Emergency Department    [address]    [facility] [address]    Phone:  [phone]    Fax:  [phone]                                       Lorna Kenyon   MRN: [id number]    Department:  [facility] Emergency Department   Date of Visit:  2/24/2018           After Visit Summary Signature Page     I have received my discharge instructions, and my questions have been answered. I have discussed any challenges I see with this plan with the nurse or doctor.    ..........................................................................................................................................  Patient/Patient Representative Signature      ..........................................................................................................................................  Patient Representative Print Name and Relationship to Patient    ..................................................               ................................................  Date                                            Time    ..........................................................................................................................................  Reviewed by Signature/Title    ...................................................              ..............................................  Date                                                            Time

## 2018-02-25 NOTE — DISCHARGE INSTRUCTIONS
Possible Miscarriage (Threatened )  You may be having a miscarriage.  Common signs of a miscarriage are pain and bleeding. A small amount of bleeding can be normal during the first 3 months of pregnancy. Often the pain and bleeding stop, and you have a normal pregnancy and baby. But heavy bleeding or severe cramping can be an early sign of miscarriage. A miscarriage means an unexpected loss of your pregnancy.  At this time, your healthcare provider doesn t know whether you will have a miscarriage, or if things will clear up and your pregnancy will continue normally. This can be emotionally difficult. There is little that can be done to change the way you feel. But understand that miscarriages are common.  About 1 or 2 out of every 10 pregnancies end this way. Some even end before you know you are pregnant. This happens for a number of reasons, and usually the cause is never known. It s important you know that it is not your fault. It didn t happen because you did anything wrong.  Having sex or exercising does not cause a miscarriage. These activities are usually safe unless you have pain or bleeding or your doctor tells you to stop. Even minor falls won t cause a miscarriage. Miscarriages happen because things were not developing as they were supposed to. No medicine can prevent a miscarriage.  Again, understand that things are uncertain right now. You may still have some bleeding. This may be light spotting or like a period, and you may pass some tissue. You may have some cramping. This is why follow-up care is important.  Home care  To improve the chance of keeping your pregnancy, you should take these steps:    Rest in bed until the pain and bleeding stop.    Don t have sex until your healthcare provider says it s OK.    Use sanitary napkins instead of tampons.    Don t douche.    Don t take aspirin, ibuprofen, or naproxen.    Don t have alcoholic or caffeinated beverages or smoke.  Follow-up care  Make  an appointment with your doctor within the next week, or as directed.  If you had an ultrasound, a radiologist will review it. You will be told of any new findings that may affect your care.  Call 911  Call 911 if you have:    Severe pain and very heavy bleeding    Severe lightheadedness, passing out, or fainting    Rapid heart rate    Difficulty breathing    Confusion or difficulty waking up  When to seek medical advice  Call your healthcare provider right away if any of these occur:    Vaginal bleeding or pain that lasts for more than 3 days    Heavy bleeding. This means soaking 1 new pad an hour over 3 hours.    Fever of 100.4 F (38 C) or higher, or as directed by your healthcare provider    Pain in your lower belly (abdomen) that gets worse    Weakness or dizziness    Passage of anything that resembles tissue. This would be pink or grayish membrane or solid material. Save the tissue in a clean container and bring it to your provider.  Date Last Reviewed: 9/1/2016 2000-2017 [employer]. [address] [place] [address]. All rights reserved. This information is not intended as a substitute for professional medical care. Always follow your healthcare professional's instructions.          Understanding Urinary Tract Infections (UTIs)  Most UTIs are caused by bacteria, although they may also be caused by viruses or fungi. Bacteria from the bowel are the most common source of infection. The infection may start because of any of the following:    Sexual activity. During sex, bacteria can travel from the penis, vagina, or rectum into the urethra.     Bacteria on the skin outside the rectum may travel into the urethra. This is more common in women since the rectum and urethra are closer to each other than in men. Wiping from front to back after using the toilet and keeping the area clean can help prevent germs from getting to the urethra.    Blockage of urine flow through the urinary tract. If  urine sits too long, germs may start to grow out of control.      Parts of the urinary tract  The infection can occur in any part of the urinary tract.    The kidneys collect and store urine.    The ureters carry urine from the kidneys to the bladder.    The bladder holds urine until you are ready to let it out.    The urethra carries urine from the bladder out of the body. It is shorter in women, so bacteria can move through it more easily. The urethra is longer in men, so a UTI is less likely to reach the bladder or kidneys in men.  Date Last Reviewed: 1/1/2017 2000-2017 [employer]. [address] [place] [address], [place] [address]. All rights reserved. This information is not intended as a substitute for professional medical care. Always follow your healthcare professional's instructions.

## 2018-02-26 ENCOUNTER — HOSPITAL ENCOUNTER (EMERGENCY)
Facility: CLINIC | Age: 33
Discharge: HOME | End: 2018-02-26
Attending: EMERGENCY MEDICINE | Admitting: EMERGENCY MEDICINE
Payer: COMMERCIAL

## 2018-02-26 ENCOUNTER — APPOINTMENT (OUTPATIENT)
Dept: ULTRASOUND IMAGING | Facility: CLINIC | Age: 33
End: 2018-02-26
Attending: EMERGENCY MEDICINE
Payer: COMMERCIAL

## 2018-02-26 VITALS
WEIGHT: 155 LBS | TEMPERATURE: 98 F | HEART RATE: 92 BPM | OXYGEN SATURATION: 99 % | SYSTOLIC BLOOD PRESSURE: 104 MMHG | RESPIRATION RATE: 16 BRPM | DIASTOLIC BLOOD PRESSURE: 59 MMHG | BODY MASS INDEX: 24.28 KG/M2

## 2018-02-26 DIAGNOSIS — R10.2 PELVIC PAIN AFFECTING PREGNANCY IN SECOND TRIMESTER, ANTEPARTUM: ICD-10-CM

## 2018-02-26 DIAGNOSIS — O26.892 PELVIC PAIN AFFECTING PREGNANCY IN SECOND TRIMESTER, ANTEPARTUM: ICD-10-CM

## 2018-02-26 DIAGNOSIS — O21.0 HYPEREMESIS GRAVIDARUM: ICD-10-CM

## 2018-02-26 LAB
ANION GAP SERPL CALCULATED.3IONS-SCNC: 8 MMOL/L (ref 3–14)
BASOPHILS # BLD AUTO: 0 10E9/L (ref 0–0.2)
BASOPHILS NFR BLD AUTO: 0.2 %
BUN SERPL-MCNC: 6 MG/DL (ref 7–30)
CALCIUM SERPL-MCNC: 8.7 MG/DL (ref 8.5–10.1)
CHLORIDE SERPL-SCNC: 104 MMOL/L (ref 94–109)
CO2 SERPL-SCNC: 23 MMOL/L (ref 20–32)
CREAT SERPL-MCNC: 0.44 MG/DL (ref 0.52–1.04)
DIFFERENTIAL METHOD BLD: NORMAL
EOSINOPHIL # BLD AUTO: 0.1 10E9/L (ref 0–0.7)
EOSINOPHIL NFR BLD AUTO: 1.5 %
ERYTHROCYTE [DISTWIDTH] IN BLOOD BY AUTOMATED COUNT: 12.2 % (ref 10–15)
GFR SERPL CREATININE-BSD FRML MDRD: >90 ML/MIN/1.7M2
GLUCOSE SERPL-MCNC: 92 MG/DL (ref 70–99)
HCT VFR BLD AUTO: 39.4 % (ref 35–47)
HGB BLD-MCNC: 13.5 G/DL (ref 11.7–15.7)
IMM GRANULOCYTES # BLD: 0 10E9/L (ref 0–0.4)
IMM GRANULOCYTES NFR BLD: 0.4 %
LACTATE BLD-SCNC: 0.9 MMOL/L (ref 0.7–2)
LYMPHOCYTES # BLD AUTO: 2 10E9/L (ref 0.8–5.3)
LYMPHOCYTES NFR BLD AUTO: 23.3 %
MCH RBC QN AUTO: 30.3 PG (ref 26.5–33)
MCHC RBC AUTO-ENTMCNC: 34.3 G/DL (ref 31.5–36.5)
MCV RBC AUTO: 88 FL (ref 78–100)
MONOCYTES # BLD AUTO: 0.6 10E9/L (ref 0–1.3)
MONOCYTES NFR BLD AUTO: 6.9 %
NEUTROPHILS # BLD AUTO: 5.7 10E9/L (ref 1.6–8.3)
NEUTROPHILS NFR BLD AUTO: 67.7 %
NRBC # BLD AUTO: 0 10*3/UL
NRBC BLD AUTO-RTO: 0 /100
PLATELET # BLD AUTO: 212 10E9/L (ref 150–450)
POTASSIUM SERPL-SCNC: 3.6 MMOL/L (ref 3.4–5.3)
RBC # BLD AUTO: 4.46 10E12/L (ref 3.8–5.2)
SODIUM SERPL-SCNC: 135 MMOL/L (ref 133–144)
WBC # BLD AUTO: 8.4 10E9/L (ref 4–11)

## 2018-02-26 PROCEDURE — 80048 BASIC METABOLIC PNL TOTAL CA: CPT | Performed by: EMERGENCY MEDICINE

## 2018-02-26 PROCEDURE — 96374 THER/PROPH/DIAG INJ IV PUSH: CPT

## 2018-02-26 PROCEDURE — 25000132 ZZH RX MED GY IP 250 OP 250 PS 637: Performed by: EMERGENCY MEDICINE

## 2018-02-26 PROCEDURE — 25000128 H RX IP 250 OP 636: Performed by: EMERGENCY MEDICINE

## 2018-02-26 PROCEDURE — 99285 EMERGENCY DEPT VISIT HI MDM: CPT | Mod: 25

## 2018-02-26 PROCEDURE — 83605 ASSAY OF LACTIC ACID: CPT | Performed by: EMERGENCY MEDICINE

## 2018-02-26 PROCEDURE — 96361 HYDRATE IV INFUSION ADD-ON: CPT

## 2018-02-26 PROCEDURE — 76815 OB US LIMITED FETUS(S): CPT

## 2018-02-26 PROCEDURE — 96375 TX/PRO/DX INJ NEW DRUG ADDON: CPT

## 2018-02-26 PROCEDURE — 85025 COMPLETE CBC W/AUTO DIFF WBC: CPT | Performed by: EMERGENCY MEDICINE

## 2018-02-26 RX ORDER — ACETAMINOPHEN 325 MG/1
975 TABLET ORAL ONCE
Status: COMPLETED | OUTPATIENT
Start: 2018-02-26 | End: 2018-02-26

## 2018-02-26 RX ORDER — HYDROMORPHONE HYDROCHLORIDE 1 MG/ML
0.5 INJECTION, SOLUTION INTRAMUSCULAR; INTRAVENOUS; SUBCUTANEOUS
Status: DISCONTINUED | OUTPATIENT
Start: 2018-02-26 | End: 2018-02-26 | Stop reason: HOSPADM

## 2018-02-26 RX ORDER — ONDANSETRON 2 MG/ML
4 INJECTION INTRAMUSCULAR; INTRAVENOUS EVERY 30 MIN PRN
Status: DISCONTINUED | OUTPATIENT
Start: 2018-02-26 | End: 2018-02-26 | Stop reason: HOSPADM

## 2018-02-26 RX ORDER — CEPHALEXIN 500 MG/1
500 CAPSULE ORAL 2 TIMES DAILY
Qty: 14 CAPSULE | Refills: 0 | Status: SHIPPED | OUTPATIENT
Start: 2018-02-26 | End: 2018-03-05

## 2018-02-26 RX ADMIN — Medication 0.5 MG: at 04:06

## 2018-02-26 RX ADMIN — ONDANSETRON 4 MG: 2 INJECTION INTRAMUSCULAR; INTRAVENOUS at 04:06

## 2018-02-26 RX ADMIN — ACETAMINOPHEN 975 MG: 325 TABLET, FILM COATED ORAL at 03:40

## 2018-02-26 RX ADMIN — SODIUM CHLORIDE 1000 ML: 9 INJECTION, SOLUTION INTRAVENOUS at 04:09

## 2018-02-26 ASSESSMENT — ENCOUNTER SYMPTOMS
FEVER: 0
NAUSEA: 1
DIARRHEA: 0
VOMITING: 1
ABDOMINAL PAIN: 1

## 2018-02-26 NOTE — ED AVS SNAPSHOT
[facility] Emergency Department    [address] [place] [address]    Phone:  [phone]    Fax:  [phone]                                       Lorna Kenyon   MRN: [id number]    Department:  [facility] Emergency Department   Date of Visit:  2/26/2018           Patient Information     Date Of Birth          1985        Your diagnoses for this visit were:     Hyperemesis gravidarum     Pelvic pain affecting pregnancy in second trimester, antepartum        You were seen by Camacho Carranza MD.      Follow-up Information     Follow up with your OB. Call today.        Discharge Instructions       Discharge Instructions  Hyperemesis Gravidarum    You were seen today for hyperemesis gravidarum. It is very common to have some nausea and vomiting in early pregnancy (sometimes called  morning sickness,  although it happens at all times of day.) In hyperemesis gravidarum, however, the vomiting is much more severe, so you may become dehydrated and lose weight. We have treated you today and improved your symptoms, but they usually come back. You need to follow-up with your regular doctor or obstetrician within 1-2 days.    Return to the Emergency Department if:    You feel dizzy or light headed when standing up.    You are vomiting and can t keep fluids or medications down.    You urinate less often than usual and have dark yellow urine.     You feel much more ill or develop new symptoms.    What can I do to help myself?    Be sure to drink plenty of cold clear fluids (sports drinks and ginger ale) and suck on popsicles between meals.    Eat as soon as you feel hungry, or even before you feel hungry. Try to keep something on your stomach.     Avoid strong smells, or other things that make you feel nauseated.    Snack often and eat small meals high in protein or carbohydrates such as crackers, bread, nuts, and low-fat yogurt. Avoid spicy, greasy, or acid foods.    Avoid  lying down right after you eat.    Take your prenatal vitamins at bedtime with a snack instead of taking them in the morning.    Holly may make you feel better. Try eating a small piece of holly, or having holly-flavored hard candies.    Acupressure wrist bands are also helpful to some people.    Treatment:    Nausea medication.  Your doctor may send you home with a prescription medicine, like Zofran  (ondansetron), Compazine  (prochlorperazine), or Reglan  (metoclopramide).       Your doctor may recommend that you take non-prescription nausea medicines, like Dramamine  (dimenhydrinate).      Vitamins. Make sure your prenatal vitamins have 400 micrograms of Folic acid and have vitamin B6, since this may help your nausea and vomiting.   If you were given a prescription for medicine here today, be sure to read all of the information (including the package insert) that comes with your prescription.  This will include important information about the medicine, its side effects, and any warnings that you need to know about.  The pharmacist who fills the prescription can provide more information and answer questions you may have about the medicine.  If you have questions or concerns that the pharmacist cannot address, please call or return to the Emergency Department.         Opioid Medication Information    Pain medications are among the most commonly prescribed medicines, so we are including this information for all our patients. If you did not receive pain medication or get a prescription for pain medicine, you can ignore it.     You may have been given a prescription for an opioid (narcotic) pain medicine and/or have received a pain medicine while here in the Emergency Department. These medicines can make you drowsy or impaired. You must not drive, operate dangerous equipment, or engage in any other dangerous activities while taking these medications. If you drive while taking these medications, you could be  arrested for DUI, or driving under the influence. Do not drink any alcohol while you are taking these medications.     Opioid pain medications can cause addiction. If you have a history of chemical dependency of any type, you are at a higher risk of becoming addicted to pain medications.  Only take these prescribed medications to treat your pain when all other options have been tried. Take it for as short a time and as few doses as possible. Store your pain pills in a secure place, as they are frequently stolen and provide a dangerous opportunity for children or visitors in your house to start abusing these powerful medications. We will not replace any lost or stolen medicine.  As soon as your pain is better, you should flush all your remaining medication.     Many prescription pain medications contain Tylenol  (acetaminophen), including Vicodin , Tylenol #3 , Norco , Lortab , and Percocet .  You should not take any extra pills of Tylenol  if you are using these prescription medications or you can get very sick.  Do not ever take more than 3000 mg of acetaminophen in any 24 hour period.    All opioids tend to cause constipation. Drink plenty of water and eat foods that have a lot of fiber, such as fruits, vegetables, prune juice, apple juice and high fiber cereal.  Take a laxative if you don t move your bowels at least every other day. Miralax , Milk of Magnesia, Colace , or Senna  can be used to keep you regular.      Remember that you can always come back to the Emergency Department if you are not able to see your regular doctor in the amount of time listed above, if you get any new symptoms, or if there is anything that worries you.          Pelvic Pain in Pregnancy: Unclear (2-3 Trimester)  You are well into your pregnancy and are having pain and pressure in your pelvic area. This is your lower belly (abdomen). Mild pelvic pressure or heaviness is very common in the latter stages of a healthy pregnancy. This is  due to the growing uterus (womb). Although the exact cause of your pain is not certain, it does not appear to be dangerous. It may be due to ligaments in your belly stretching to support your uterus as it grows. The weight of your baby may also be causing pressure and pain. Pain can be caused by the bones of your pelvis shifting as your body makes room for the baby to pass through. This is known as symphysis pubis dysfunction (SPD). SPD can cause quite a bit of pain and discomfort as the due date nears.     Pain in the low back is common during pregnancy.   Home care  The following are general care guidelines:    Avoid strenuous activities and long periods of standing. Bed rest is not needed unless your doctor has recommended it.    Exercise for 30 minutes all or most days of the week. This will promote muscle tone, strength, and endurance. Ask your healthcare provider what exercises to do and to avoid.    Sit in a warm (NOT hot) bath. This helps relax tight, painful muscles.    Sleep on your side with a pillow between your legs. This helps align your pelvis.    Eat frequent, light meals. Choose foods that are easy to digest.    Ask your doctor whether a pregnancy support belt could help you.    If told to by your healthcare provider, take an over-the-counter medicine, such as acetaminophen, to relieve pain. Follow instructions carefully for how much to take and how often to take it. Do not take aspirin or nonsteroidal anti-inflammatory medicines (like ibuprofen) unless you have been told to do so by your healthcare provider.  Follow-up care  Follow up with your healthcare provider, or as advised.  When to seek medical advice  Call your healthcare provider right away if any of these occur:     Sudden or gradual worsening abdominal pain    Fainting, dizziness, or weakness when standing    Any vaginal bleeding    Leakage of fluid from the vagina    Decreased fetal motion    Repeated vomiting or diarrhea    Pain that  seems to settle in one area, especially the lower right abdomen    Blood in vomit or bowel movements (dark red or black color)    Fever of 100.4 F (38 C) or higher  Date Last Reviewed: 6/1/2016 2000-2017 [employer]. [address] [place] [address]. All rights reserved. This information is not intended as a substitute for professional medical care. Always follow your healthcare professional's instructions.          24 Hour Appointment Hotline       To make an appointment at any [facility], call [phone] ([phone]). If you don't have a family doctor or clinic, we will help you find one. [facility] clinics are conveniently located to serve the needs of you and your family.             Review of your medicines      CONTINUE these medicines which may have CHANGED, or have new prescriptions. If we are uncertain of the size of tablets/capsules you have at home, strength may be listed as something that might have changed.        Dose / Directions Last dose taken    cephALEXin 500 MG capsule   Commonly known as:  KEFLEX   Dose:  500 mg   What changed:  when to take this   Quantity:  14 capsule        Take 1 capsule (500 mg) by mouth 2 times daily for 7 days   Refills:  0          Our records show that you are taking the medicines listed below. If these are incorrect, please call your family doctor or clinic.        Dose / Directions Last dose taken    B-6 50 MG Tabs   Dose:  1 tablet   Quantity:  30 tablet        Take 1 tablet by mouth At Bedtime   Refills:  0        docusate sodium 100 MG tablet   Commonly known as:  COLACE   Dose:  100 mg   Quantity:  30 tablet        Take 100 mg by mouth daily (while using narcotics)   Refills:  1        doxylamine 25 MG Tabs tablet   Commonly known as:  UNISOM   Dose:  25 mg   Quantity:  30 each        Take 1 tablet (25 mg) by mouth At Bedtime   Refills:  0        HYDROcodone-acetaminophen 5-325 MG per tablet   Commonly known as:  NORCO   Dose:   1 tablet   Quantity:  20 tablet        Take 1 tablet by mouth every 6 hours as needed for moderate to severe pain   Refills:  0        ibuprofen 600 MG tablet   Commonly known as:  ADVIL/MOTRIN   Dose:  600 mg   Quantity:  90 tablet        Take 1 tablet (600 mg) by mouth every 6 hours as needed for moderate pain   Refills:  1        * oxyCODONE-acetaminophen 5-325 MG per tablet   Commonly known as:  PERCOCET   Dose:  1-2 tablet   Quantity:  30 tablet        Take 1-2 tablets by mouth every 6 hours as needed for pain   Refills:  0        * oxyCODONE-acetaminophen 5-325 MG per tablet   Commonly known as:  PERCOCET   Dose:  1-2 tablet   Quantity:  15 tablet        Take 1-2 tablets by mouth every 4 hours as needed for pain (moderate to severe)   Refills:  0        prochlorperazine 10 MG tablet   Commonly known as:  COMPAZINE   Dose:  10 mg   Quantity:  20 tablet        Take 1 tablet (10 mg) by mouth every 6 hours as needed for nausea or vomiting   Refills:  0        * Notice:  This list has 2 medication(s) that are the same as other medications prescribed for you. Read the directions carefully, and ask your doctor or other care provider to review them with you.            Prescriptions were sent or printed at these locations (1 Prescription)                   Other Prescriptions                Printed at Department/Unit printer (1 of 1)         cephALEXin (KEFLEX) 500 MG capsule                Procedures and tests performed during your visit     Basic metabolic panel    CBC with platelets differential    Lactic acid whole blood    NO Rho (D) immune globulin (RhoGam) needed - NOT obstetric patient    Peripheral IV: Standard    US OB Limited One Or More Fetuses      Orders Needing Specimen Collection     None      Pending Results     Date and Time Order Name Status Description    2/26/2018 0528  OB Limited One Or More Fetuses Preliminary             Pending Culture Results     No orders found from 2/24/2018 to 2/27/2018.             Pending Results Instructions     If you had any lab results that were not finalized at the time of your Discharge, you can call the ED Lab Result RN at [phone]. You will be contacted by this team for any positive Lab results or changes in treatment. The nurses are available 7 days a week from 10A to 6:30P.  You can leave a message 24 hours per day and they will return your call.        Test Results From Your Hospital Stay        2/26/2018  4:21 AM      Component Results     Component Value Ref Range & Units Status    WBC 8.4 4.0 - 11.0 10e9/L Final    RBC Count 4.46 3.8 - 5.2 10e12/L Final    Hemoglobin 13.5 11.7 - 15.7 g/dL Final    Hematocrit 39.4 35.0 - 47.0 % Final    MCV 88 78 - 100 fl Final    MCH 30.3 26.5 - 33.0 pg Final    MCHC 34.3 31.5 - 36.5 g/dL Final    RDW 12.2 10.0 - 15.0 % Final    Platelet Count 212 150 - 450 10e9/L Final    Diff Method Automated Method  Final    % Neutrophils 67.7 % Final    % Lymphocytes 23.3 % Final    % Monocytes 6.9 % Final    % Eosinophils 1.5 % Final    % Basophils 0.2 % Final    % Immature Granulocytes 0.4 % Final    Nucleated RBCs 0 0 /100 Final    Absolute Neutrophil 5.7 1.6 - 8.3 10e9/L Final    Absolute Lymphocytes 2.0 0.8 - 5.3 10e9/L Final    Absolute Monocytes 0.6 0.0 - 1.3 10e9/L Final    Absolute Eosinophils 0.1 0.0 - 0.7 10e9/L Final    Absolute Basophils 0.0 0.0 - 0.2 10e9/L Final    Abs Immature Granulocytes 0.0 0 - 0.4 10e9/L Final    Absolute Nucleated RBC 0.0  Final         2/26/2018  4:39 AM      Component Results     Component Value Ref Range & Units Status    Sodium 135 133 - 144 mmol/L Final    Potassium 3.6 3.4 - 5.3 mmol/L Final    Chloride 104 94 - 109 mmol/L Final    Carbon Dioxide 23 20 - 32 mmol/L Final    Anion Gap 8 3 - 14 mmol/L Final    Glucose 92 70 - 99 mg/dL Final    Urea Nitrogen 6 (L) 7 - 30 mg/dL Final    Creatinine 0.44 (L) 0.52 - 1.04 mg/dL Final    GFR Estimate >90 >60 mL/min/1.7m2 Final    Non African American GFR Calc     GFR Estimate If Black >90 >60 mL/min/1.7m2 Final    African American GFR Calc    Calcium 8.7 8.5 - 10.1 mg/dL Final         2/26/2018  4:21 AM      Component Results     Component Value Ref Range & Units Status    Lactic Acid 0.9 0.7 - 2.0 mmol/L Final         2/26/2018  6:09 AM      Narrative     US OB LIMITED ONE OR MORE FETUSES  2/26/2018 5:56 AM    HISTORY: Abdominal pain.    COMPARISON: None.    FINDINGS:    Presentation: Variable.  Cardiac activity: 146 bpm. Regular rhythm.  Movement: Reported as normal by sonographer.  Placenta: Posterior.  Low-lying/marginal placenta previa.  Cervical length: 3.0 cm.  Amniotic fluid: JOS: 7.7 cm.    Measured Parameters:       BPD:  3.1 cm  Age: 15 weeks, 6 days.       HC:    11.8 cm  Age: 15 weeks, 6 days.       AC:  9.5 cm  Age: 15 weeks, 5 days.       FL:   1.9 cm  Age: 15 weeks, 5 days.    Gestational age by current ultrasound measurement: 15 weeks, 6 days,  corresponding to an EDE of 8/14/2018.    Gestational age based on the reported previously established due date:  15 weeks, 5 days, corresponding to an EDE of 8/15/2018.    Estimated fetal weight: 131 grams, corresponding to the 69th  percentile based on the reported previously established due date.  Complete anatomic survey was not performed on this study due to the  early age.        Impression     IMPRESSION:    1. No acute findings. Single live intrauterine pregnancy 15 weeks and  6 days by current measurements  2. Low-lying placenta versus marginal previa. Followup recommended.  This will likely movement from the cervix with advancing gestational  age.                Clinical Quality Measure: Blood Pressure Screening     Your blood pressure was checked while you were in the emergency department today. The last reading we obtained was  BP: 104/59 . Please read the guidelines below about what these numbers mean and what you should do about them.  If your systolic blood pressure (the top number) is less than 120  "and your diastolic blood pressure (the bottom number) is less than 80, then your blood pressure is normal. There is nothing more that you need to do about it.  If your systolic blood pressure (the top number) is 120-139 or your diastolic blood pressure (the bottom number) is 80-89, your blood pressure may be higher than it should be. You should have your blood pressure rechecked within a year by a primary care provider.  If your systolic blood pressure (the top number) is 140 or greater or your diastolic blood pressure (the bottom number) is 90 or greater, you may have high blood pressure. High blood pressure is treatable, but if left untreated over time it can put you at risk for heart attack, stroke, or kidney failure. You should have your blood pressure rechecked by a primary care provider within the next 4 weeks.  If your provider in the emergency department today gave you specific instructions to follow-up with your doctor or provider even sooner than that, you should follow that instruction and not wait for up to 4 weeks for your follow-up visit.        Thank you for choosing [facility]       Thank you for choosing [facility] for your care. Our goal is always to provide you with excellent care. Hearing back from our patients is one way we can continue to improve our services. Please take a few minutes to complete the written survey that you may receive in the mail after you visit with us. Thank you!        [employer] Information     [employer] lets you send messages to your doctor, view your test results, renew your prescriptions, schedule appointments and more. To sign up, go to [email or web address] . Click on \"Log in\" on the left side of the screen, which will take you to the Welcome page. Then click on \"Sign up Now\" on the right side of the page.     You will be asked to enter the access code listed below, as well as some personal information. Please follow the directions to create your username and password.   "   Your access code is: [email or web address]  Expires: 3/26/2018  2:45 PM     Your access code will  in 90 days. If you need help or a new code, please call your [facility] clinic or [phone].        Care EveryWhere ID     This is your Care EveryWhere ID. This could be used by other organizations to access your [facility] medical records  [id number]        Equal Access to Services     DONALDO [facility] : Hadii hay valdezo Sobilly, waaxda luqadaha, qaybta kaalmada adejoseluis, matthias sims . [facility] [phone].    ATENCIÓN: Si habla español, tiene a cisse disposición servicios gratuitos de asistencia lingüística. Llame al [phone].    We comply with applicable federal civil rights laws and [place] laws. We do not discriminate on the basis of race, color, national origin, age, disability, sex, sexual orientation, or gender identity.            After Visit Summary       This is your record. Keep this with you and show to your community pharmacist(s) and doctor(s) at your next visit.

## 2018-02-26 NOTE — DISCHARGE INSTRUCTIONS
Discharge Instructions  Hyperemesis Gravidarum    You were seen today for hyperemesis gravidarum. It is very common to have some nausea and vomiting in early pregnancy (sometimes called  morning sickness,  although it happens at all times of day.) In hyperemesis gravidarum, however, the vomiting is much more severe, so you may become dehydrated and lose weight. We have treated you today and improved your symptoms, but they usually come back. You need to follow-up with your regular doctor or obstetrician within 1-2 days.    Return to the Emergency Department if:    You feel dizzy or light headed when standing up.    You are vomiting and can t keep fluids or medications down.    You urinate less often than usual and have dark yellow urine.     You feel much more ill or develop new symptoms.    What can I do to help myself?    Be sure to drink plenty of cold clear fluids (sports drinks and ginger ale) and suck on popsicles between meals.    Eat as soon as you feel hungry, or even before you feel hungry. Try to keep something on your stomach.     Avoid strong smells, or other things that make you feel nauseated.    Snack often and eat small meals high in protein or carbohydrates such as crackers, bread, nuts, and low-fat yogurt. Avoid spicy, greasy, or acid foods.    Avoid lying down right after you eat.    Take your prenatal vitamins at bedtime with a snack instead of taking them in the morning.    Ginger may make you feel better. Try eating a small piece of ginger, or having ginger-flavored hard candies.    Acupressure wrist bands are also helpful to some people.    Treatment:    Nausea medication.  Your doctor may send you home with a prescription medicine, like Zofran  (ondansetron), Compazine  (prochlorperazine), or Reglan  (metoclopramide).       Your doctor may recommend that you take non-prescription nausea medicines, like Dramamine  (dimenhydrinate).      Vitamins. Make sure your prenatal vitamins have 400  micrograms of Folic acid and have vitamin B6, since this may help your nausea and vomiting.   If you were given a prescription for medicine here today, be sure to read all of the information (including the package insert) that comes with your prescription.  This will include important information about the medicine, its side effects, and any warnings that you need to know about.  The pharmacist who fills the prescription can provide more information and answer questions you may have about the medicine.  If you have questions or concerns that the pharmacist cannot address, please call or return to the Emergency Department.         Opioid Medication Information    Pain medications are among the most commonly prescribed medicines, so we are including this information for all our patients. If you did not receive pain medication or get a prescription for pain medicine, you can ignore it.     You may have been given a prescription for an opioid (narcotic) pain medicine and/or have received a pain medicine while here in the Emergency Department. These medicines can make you drowsy or impaired. You must not drive, operate dangerous equipment, or engage in any other dangerous activities while taking these medications. If you drive while taking these medications, you could be arrested for DUI, or driving under the influence. Do not drink any alcohol while you are taking these medications.     Opioid pain medications can cause addiction. If you have a history of chemical dependency of any type, you are at a higher risk of becoming addicted to pain medications.  Only take these prescribed medications to treat your pain when all other options have been tried. Take it for as short a time and as few doses as possible. Store your pain pills in a secure place, as they are frequently stolen and provide a dangerous opportunity for children or visitors in your house to start abusing these powerful medications. We will not replace any  lost or stolen medicine.  As soon as your pain is better, you should flush all your remaining medication.     Many prescription pain medications contain Tylenol  (acetaminophen), including Vicodin , Tylenol #3 , Norco , Lortab , and Percocet .  You should not take any extra pills of Tylenol  if you are using these prescription medications or you can get very sick.  Do not ever take more than 3000 mg of acetaminophen in any 24 hour period.    All opioids tend to cause constipation. Drink plenty of water and eat foods that have a lot of fiber, such as fruits, vegetables, prune juice, apple juice and high fiber cereal.  Take a laxative if you don t move your bowels at least every other day. Miralax , Milk of Magnesia, Colace , or Senna  can be used to keep you regular.      Remember that you can always come back to the Emergency Department if you are not able to see your regular doctor in the amount of time listed above, if you get any new symptoms, or if there is anything that worries you.          Pelvic Pain in Pregnancy: Unclear (2-3 Trimester)  You are well into your pregnancy and are having pain and pressure in your pelvic area. This is your lower belly (abdomen). Mild pelvic pressure or heaviness is very common in the latter stages of a healthy pregnancy. This is due to the growing uterus (womb). Although the exact cause of your pain is not certain, it does not appear to be dangerous. It may be due to ligaments in your belly stretching to support your uterus as it grows. The weight of your baby may also be causing pressure and pain. Pain can be caused by the bones of your pelvis shifting as your body makes room for the baby to pass through. This is known as symphysis pubis dysfunction (SPD). SPD can cause quite a bit of pain and discomfort as the due date nears.     Pain in the low back is common during pregnancy.   Home care  The following are general care guidelines:    Avoid strenuous activities and long  periods of standing. Bed rest is not needed unless your doctor has recommended it.    Exercise for 30 minutes all or most days of the week. This will promote muscle tone, strength, and endurance. Ask your healthcare provider what exercises to do and to avoid.    Sit in a warm (NOT hot) bath. This helps relax tight, painful muscles.    Sleep on your side with a pillow between your legs. This helps align your pelvis.    Eat frequent, light meals. Choose foods that are easy to digest.    Ask your doctor whether a pregnancy support belt could help you.    If told to by your healthcare provider, take an over-the-counter medicine, such as acetaminophen, to relieve pain. Follow instructions carefully for how much to take and how often to take it. Do not take aspirin or nonsteroidal anti-inflammatory medicines (like ibuprofen) unless you have been told to do so by your healthcare provider.  Follow-up care  Follow up with your healthcare provider, or as advised.  When to seek medical advice  Call your healthcare provider right away if any of these occur:     Sudden or gradual worsening abdominal pain    Fainting, dizziness, or weakness when standing    Any vaginal bleeding    Leakage of fluid from the vagina    Decreased fetal motion    Repeated vomiting or diarrhea    Pain that seems to settle in one area, especially the lower right abdomen    Blood in vomit or bowel movements (dark red or black color)    Fever of 100.4 F (38 C) or higher  Date Last Reviewed: 6/1/2016 2000-2017 [employer]. [address] [place] [address], [place] [address]. All rights reserved. This information is not intended as a substitute for professional medical care. Always follow your healthcare professional's instructions.

## 2018-02-26 NOTE — ED AVS SNAPSHOT
[facility] Emergency Department    [address]    [facility] [address]    Phone:  [phone]    Fax:  [phone]                                       Lorna Kenyon   MRN: [id number]    Department:  [facility] Emergency Department   Date of Visit:  2/26/2018           After Visit Summary Signature Page     I have received my discharge instructions, and my questions have been answered. I have discussed any challenges I see with this plan with the nurse or doctor.    ..........................................................................................................................................  Patient/Patient Representative Signature      ..........................................................................................................................................  Patient Representative Print Name and Relationship to Patient    ..................................................               ................................................  Date                                            Time    ..........................................................................................................................................  Reviewed by Signature/Title    ...................................................              ..............................................  Date                                                            Time

## 2018-02-26 NOTE — ED PROVIDER NOTES
History     Chief Complaint:  Emesis During Pregnancy and Abdominal Pain    The history is provided by the patient and the spouse. A  was used (Patient's ).      Lorna Kenyon is a 32-year-old  female who is currently around 3-4 months pregnant and presents with cramping abdominal pain.  The patient was also having bleeding but this is now resolved.  The patient has associated nausea, vomiting.  No diarrhea.  No fever.  Of note, the patient was diagnosed with a UTI on  but has not filled her prescription for Keflex yet.    Allergies:  No known drug allergies.     Medications:    Keflex (not taking)   Compazine   [employer]     Past Medical History:    History reviewed.  No significant past medical history.      Past Surgical History:    Cholecystectomy     Family History:    History reviewed. No pertinent family history.     Social History:  Marital Status:    Presents to the ED with  and mother   Tobacco Use: Never  Alcohol Use: No   PCP: [facility]      Review of Systems   Constitutional: Negative for fever.   Gastrointestinal: Positive for abdominal pain, nausea and vomiting. Negative for diarrhea.   All other systems reviewed and are negative.    Physical Exam   Patient Vitals for the past 24 hrs:   BP Temp Temp src Pulse Resp SpO2 Weight   18 0520 - - - - - 99 % -   18 0510 104/59 - - 92 - 100 % -   18 0436 108/70 - - - - - -   18 0408 - - - 116 - 98 % -   18 0335 (!) 129/110 98  F (36.7  C) Temporal 133 22 98 % 70.3 kg (155 lb)      Physical Exam  Constitutional:  Oriented to person, place, and time. Anxious. Crying.   HENT:   Head:    Normocephalic.   Mouth/Throat:   Oropharynx is clear and moist.   Eyes:    EOM are normal. Pupils are equal, round, and reactive to light.   Neck:    Neck supple.   Cardiovascular:  Normal rate, regular rhythm and normal heart sounds.      Exam reveals no gallop and no  friction rub.       No murmur heard.  Pulmonary/Chest:  Effort normal and breath sounds normal.      No respiratory distress. No wheezes. No rales.      No reproducible chest wall pain.  Abdominal:   Soft. No distension. Lower pelvic tenderness. No rebound and no guarding.   Musculoskeletal:  Normal range of motion.   Neurological:   Alert and oriented to person, place, and time.           Moves all 4 extremities spontaneously    Skin:    No rash noted. No pallor.      Emergency Department Course   Imaging:  US OB Limited One or More Fetuses  1. No acute findings. Single live intrauterine pregnancy 15 weeks and  6 days by current measurements  2. Low-lying placenta versus marginal previa. Followup recommended.  This will likely movement from the cervix with advancing gestational  age.    Radiographic findings were communicated with the patient who voiced understanding of the findings.    Laboratory:  Blood:  CBC:  WBC 8.4, HGB 13.5, , otherwise WNL  BMP: BUN 6, Creatinine 0.44, otherwise WNL    Lactate: 0.9     Interventions:  (0340) Tylenol, 975 mg, PO   (0406) Zofran, 4 mg, IV injection   (0406) Dilaudid, 0.5 mg, IV injection   (0409) Normal Saline, 1 liter, IV bolus     Emergency Department Course:  Nursing notes and vitals reviewed.    (0346) I entered the room with my scribe, obtained the history, and performed an exam of the patient as documented above.    A peripheral IV was established. Blood was drawn from the patient. This was sent for laboratory testing, findings above.      The patient was sent for an obstetric ultrasound while in the emergency department, findings above.      (0615) I went to update the patient on the findings of the labs and imaging.      Findings and plan explained to the patient. Patient discharged home with instructions regarding supportive care, medications, and reasons to return. The importance of close follow-up was reviewed. The patient was prescribed Keflex.        Impression & Plan    Medical Decision Making:  Lorna Kenyon is a 32-year-old female who presents for evaluation of nausea and vomiting.  She is currently pregnant.  Nonetheless, I considered a broad differential diagnosis for this patient including viral gastroenteritis, food poisoning, bowel obstruction, intra-abdominal infection such as colitis, cholecystitis, UTI, pyelonephritis, appendicitis, etc.  There are no signs of worrisome intra-abdominal pathologies detected during the visit today.  The patient has a completely benign abdominal exam without rebound, guarding, or marked tenderness to palpation.  I doubt ectopic pregnancy based on ultrasound showing single live IUP.      Of note, The patient was previously seen here two days ago for threatened miscarriage. The bleeding has since stopped. She is still complaining of continuing pelvic pain. She otherwise has a benign abdomen on exam, particularly no right lower quadrant tenderness; no concern for acute appendicitis or other surgical process. I see no reason to do pelvic exam today as bleeding has stopped. She has a reassuring ultrasound and nausea is currently improved. I am suspicious of round ligament pain, although this remains to be seen.     The patient was also diagnosed with a UTI at this time but did not fill her prescription. Prescription reordered for the patient. She was told the importance of treating UTI in pregnancy and the risks of not doing so.     Supportive outpatient management is therefore indicated. Patient already has a prescription for Zofran at home.  Abdominal pain precautions are given for home.  It was discussed with the patient to return to the ED for blood in stool, increasing pain, or fevers more than 102.   Feels much improved after interventions in ED.  See above for medications for home.  I believe all of her symptoms are at this point explained by the pregnancy and hyperemesis gravidarum.      Diagnosis:    ICD-10-CM    1. Hyperemesis gravidarum O21.0   2. Pelvic pain affecting pregnancy in second trimester, antepartum O26.892    R10.2     Disposition:  discharged to home    Discharge Medications:  New Prescriptions    CEPHALEXIN (KEFLEX) 500 MG CAPSULE    Take 1 capsule (500 mg) by mouth 2 times daily for 7 days           [facility] EMERGENCY DEPARTMENT      Scribe disclosure:  Hector HERNANDEZ, am serving as a scribe on 2/26/2018 at 3:46 AM to personally document services performed by Camacho Carranza MD, based on my observations and the provider's statements to me.                 Camacho Carranza MD  02/26/18 0631

## 2018-02-26 NOTE — ED NOTES
Pt was seen on evening d/c home with threatened ab  UTI  Pain un tolerable 10/10  Nauseated    Did not get rx filled pharm not open   Here for re eval  Now vaginal bleeding has stopped but pain has increased

## 2018-02-27 ENCOUNTER — HOME INFUSION (PRE-WILLOW HOME INFUSION) (OUTPATIENT)
Dept: PHARMACY | Facility: CLINIC | Age: 33
End: 2018-02-27

## 2018-02-28 NOTE — PROGRESS NOTES
This is a recent snapshot of the patient's [facility] medical record.  For current drug dose and complete information and questions, call [phone] or In Basket pool, fv home infusion ([id number])  CSN Number:  [id number]

## 2018-03-01 ENCOUNTER — HOME INFUSION (PRE-WILLOW HOME INFUSION) (OUTPATIENT)
Dept: PHARMACY | Facility: CLINIC | Age: 33
End: 2018-03-01

## 2018-03-02 NOTE — PROGRESS NOTES
This is a recent snapshot of the patient's [facility] medical record.  For current drug dose and complete information and questions, call [phone] or In Basket pool, fv home infusion ([id number])  CSN Number:  [id number]

## 2018-03-05 ENCOUNTER — HOME INFUSION (PRE-WILLOW HOME INFUSION) (OUTPATIENT)
Dept: PHARMACY | Facility: CLINIC | Age: 33
End: 2018-03-05

## 2018-03-12 NOTE — PROGRESS NOTES
This is a recent snapshot of the patient's [facility] medical record.  For current drug dose and complete information and questions, call [phone] or In Basket pool, fv home infusion ([id number])  CSN Number:  [id number]

## 2018-04-27 ENCOUNTER — TRANSFERRED RECORDS (OUTPATIENT)
Dept: HEALTH INFORMATION MANAGEMENT | Facility: CLINIC | Age: 33
End: 2018-04-27

## 2018-05-16 ENCOUNTER — PRE VISIT (OUTPATIENT)
Dept: MATERNAL FETAL MEDICINE | Facility: CLINIC | Age: 33
End: 2018-05-16

## 2018-05-18 NOTE — PROGRESS NOTES
This is a recent snapshot of the patient's [facility] medical record.  For current drug dose and complete information and questions, call [phone] or In Basket pool, fv home infusion ([id number])  CSN Number:  [id number]

## 2018-05-22 ENCOUNTER — HOSPITAL ENCOUNTER (OUTPATIENT)
Dept: ULTRASOUND IMAGING | Facility: CLINIC | Age: 33
Discharge: HOME | End: 2018-05-22
Attending: OBSTETRICS & GYNECOLOGY | Admitting: OBSTETRICS & GYNECOLOGY
Payer: MEDICAID

## 2018-05-22 ENCOUNTER — OFFICE VISIT (OUTPATIENT)
Dept: MATERNAL FETAL MEDICINE | Facility: CLINIC | Age: 33
End: 2018-05-22
Attending: OBSTETRICS & GYNECOLOGY
Payer: MEDICAID

## 2018-05-22 DIAGNOSIS — O26.90 PREGNANCY RELATED CONDITION, UNSPECIFIED TRIMESTER: ICD-10-CM

## 2018-05-22 DIAGNOSIS — O36.5990 POOR FETAL GROWTH AFFECTING MANAGEMENT OF MOTHER, ANTEPARTUM, SINGLE OR UNSPECIFIED FETUS: Primary | ICD-10-CM

## 2018-05-22 PROCEDURE — 76811 OB US DETAILED SNGL FETUS: CPT

## 2018-05-22 NOTE — MR AVS SNAPSHOT
"              After Visit Summary   5/22/2018    Lorna Kenyon    MRN: [id number]           Patient Information     Date Of Birth          1985        Visit Information        Provider Department      5/22/2018 10:00 AM Parish Vitale MD [facility] Maternal Fetal Medicine [facility]        Today's Diagnoses     Poor fetal growth affecting management of mother, antepartum, single or unspecified fetus    -  1       Follow-ups after your visit        Future tests that were ordered for you today     Open Future Orders        Priority Expected Expires Ordered    MFM US Comprehensive Single F/U Routine 6/12/2018 5/22/2019 5/22/2018            Who to contact     If you have questions or need follow up information about today's clinic visit or your schedule please contact [facility]AL [facility] directly at [phone].  Normal or non-critical lab and imaging results will be communicated to you by [employer], letter or phone within 4 business days after the clinic has received the results. If you do not hear from us within 7 days, please contact the clinic through [employer] or phone. If you have a critical or abnormal lab result, we will notify you by phone as soon as possible.  Submit refill requests through [employer] or call your pharmacy and they will forward the refill request to us. Please allow 3 business days for your refill to be completed.          Additional Information About Your Visit        [employer] Information     [employer] lets you send messages to your doctor, view your test results, renew your prescriptions, schedule appointments and more. To sign up, go to [email or web address] . Click on \"Log in\" on the left side of the screen, which will take you to the Welcome page. Then click on \"Sign up Now\" on the right side of the page.     You will be asked to enter the access code listed below, as well as some personal information. Please follow the directions to create your username and " password.     Your access code is: [email or web address]  Expires: 2018 10:28 AM     Your access code will  in 90 days. If you need help or a new code, please call your [facility] clinic or [phone].        Care EveryWhere ID     This is your Care EveryWhere ID. This could be used by other organizations to access your [facility] medical records  [id number]        Your Vitals Were     Last Period                   11/15/2017            Blood Pressure from Last 3 Encounters:   18 104/59   18 111/74   18 99/63    Weight from Last 3 Encounters:   18 70.3 kg (155 lb)   14 59 kg (130 lb)   14 59 kg (130 lb)               Primary Care Provider Fax #    [facility] [phone], [address]  [facility] [address]        Equal Access to Services     [facility] : Hadii aad ku hadasho Sobilly, waaxda luqadaha, qaybta kaalmada adeegyada, matthias sims . So [facility] [phone].    ATENCIÓN: Si habla español, tiene a cisse disposición servicios gratuitos de asistencia lingüística. Llame al [phone].    We comply with applicable federal civil rights laws and [place] laws. We do not discriminate on the basis of race, color, national origin, age, disability, sex, sexual orientation, or gender identity.            Thank you!     Thank you for choosing [facility]  for your care. Our goal is always to provide you with excellent care. Hearing back from our patients is one way we can continue to improve our services. Please take a few minutes to complete the written survey that you may receive in the mail after your visit with us. Thank you!             Your Updated Medication List - Protect others around you: Learn how to safely use, store and throw away your medicines at www.disposemymeds.org.          This list is accurate as of 18 10:28 AM.  Always use your most recent med list.                    Brand Name Dispense Instructions for use Diagnosis    B-6 50 MG Tabs     30 tablet    Take 1 tablet by mouth At Bedtime        docusate sodium 100 MG tablet    COLACE    30 tablet    Take 100 mg by mouth daily (while using narcotics)        doxylamine 25 MG Tabs tablet    UNISOM    30 each    Take 1 tablet (25 mg) by mouth At Bedtime        HYDROcodone-acetaminophen 5-325 MG per tablet    NORCO    20 tablet    Take 1 tablet by mouth every 6 hours as needed for moderate to severe pain    Acute post-operative pain       ibuprofen 600 MG tablet    ADVIL/MOTRIN    90 tablet    Take 1 tablet (600 mg) by mouth every 6 hours as needed for moderate pain    Acute post-operative pain       * oxyCODONE-acetaminophen 5-325 MG per tablet    PERCOCET    30 tablet    Take 1-2 tablets by mouth every 6 hours as needed for pain        * oxyCODONE-acetaminophen 5-325 MG per tablet    PERCOCET    15 tablet    Take 1-2 tablets by mouth every 4 hours as needed for pain (moderate to severe)    Biliary colic       prochlorperazine 10 MG tablet    COMPAZINE    20 tablet    Take 1 tablet (10 mg) by mouth every 6 hours as needed for nausea or vomiting        * Notice:  This list has 2 medication(s) that are the same as other medications prescribed for you. Read the directions carefully, and ask your doctor or other care provider to review them with you.

## 2018-05-22 NOTE — PROGRESS NOTES
"Please see \"Imaging\" tab under \"Chart Review\" for details of today's US at the [facility].    Parish Vitale MD  Maternal-Fetal Medicine    "

## 2018-06-12 ENCOUNTER — OFFICE VISIT (OUTPATIENT)
Dept: MATERNAL FETAL MEDICINE | Facility: CLINIC | Age: 33
End: 2018-06-12
Attending: OBSTETRICS & GYNECOLOGY
Payer: COMMERCIAL

## 2018-06-12 ENCOUNTER — HOSPITAL ENCOUNTER (OUTPATIENT)
Dept: ULTRASOUND IMAGING | Facility: CLINIC | Age: 33
Discharge: HOME | End: 2018-06-12
Attending: OBSTETRICS & GYNECOLOGY | Admitting: OBSTETRICS & GYNECOLOGY
Payer: COMMERCIAL

## 2018-06-12 DIAGNOSIS — O36.5990 POOR FETAL GROWTH AFFECTING MANAGEMENT OF MOTHER, ANTEPARTUM, SINGLE OR UNSPECIFIED FETUS: ICD-10-CM

## 2018-06-12 DIAGNOSIS — O36.5930 POOR FETAL GROWTH AFFECTING MANAGEMENT OF MOTHER IN THIRD TRIMESTER, SINGLE OR UNSPECIFIED FETUS: Primary | ICD-10-CM

## 2018-06-12 PROCEDURE — 76816 OB US FOLLOW-UP PER FETUS: CPT

## 2018-06-12 NOTE — MR AVS SNAPSHOT
"              After Visit Summary   2018    Lorna Kenyon    MRN: [id number]           Patient Information     Date Of Birth          1985        Visit Information        Provider Department      2018 12:15 PM Shiela Avendaño MD [facility] Maternal Fetal Medicine [facility]        Today's Diagnoses     Poor fetal growth affecting management of mother in third trimester, single or unspecified fetus    -  1       Follow-ups after your visit        Who to contact     If you have questions or need follow up information about today's clinic visit or your schedule please contact [facility] MATERNAL FETAL MEDICINE [facility] directly at [phone].  Normal or non-critical lab and imaging results will be communicated to you by CohBarhart, letter or phone within 4 business days after the clinic has received the results. If you do not hear from us within 7 days, please contact the clinic through CohBarhart or phone. If you have a critical or abnormal lab result, we will notify you by phone as soon as possible.  Submit refill requests through BeanJockey or call your pharmacy and they will forward the refill request to us. Please allow 3 business days for your refill to be completed.          Additional Information About Your Visit        MyChart Information     BeanJockey lets you send messages to your doctor, view your test results, renew your prescriptions, schedule appointments and more. To sign up, go to [email or web address] . Click on \"Log in\" on the left side of the screen, which will take you to the Welcome page. Then click on \"Sign up Now\" on the right side of the page.     You will be asked to enter the access code listed below, as well as some personal information. Please follow the directions to create your username and password.     Your access code is: [email or web address]  Expires: 2018 10:28 AM     Your access code will  in 90 days. If you need help or a new code, please call your [place] clinic or " [phone].        Care EveryWhere ID     This is your Care EveryWhere ID. This could be used by other organizations to access your [place] medical records  [id number]        Your Vitals Were     Last Period                   11/15/2017            Blood Pressure from Last 3 Encounters:   02/26/18 104/59   02/24/18 111/74   01/07/18 99/63    Weight from Last 3 Encounters:   02/26/18 70.3 kg (155 lb)   08/12/14 59 kg (130 lb)   08/08/14 59 kg (130 lb)              Today, you had the following     No orders found for display       Primary Care Provider Fax #    [facility] [phone] [address], [address]  [facility] [address]        Equal Access to Services     DONALDO SON : Hadii aad ku hadasho Sobilly, waaxda luqadaha, qaybta kaalmada adeegyada, matthias sims . So [facility] [phone].    ATENCIÓN: Si habla español, tiene a cisse disposición servicios gratuitos de asistencia lingüística. LlKindred Healthcare [phone].    We comply with applicable federal civil rights laws and [place] laws. We do not discriminate on the basis of race, color, national origin, age, disability, sex, sexual orientation, or gender identity.            Thank you!     Thank you for choosing [facility]ERNAL FETAL [facility]IC[facility]  for your care. Our goal is always to provide you with excellent care. Hearing back from our patients is one way we can continue to improve our services. Please take a few minutes to complete the written survey that you may receive in the mail after your visit with us. Thank you!             Your Updated Medication List - Protect others around you: Learn how to safely use, store and throw away your medicines at www.disposemymeds.org.          This list is accurate as of 6/12/18  1:10 PM.  Always use your most recent med list.                   Brand Name Dispense Instructions for use Diagnosis    B-6 50 MG Tabs     30 tablet    Take 1 tablet by mouth At  Bedtime        docusate sodium 100 MG tablet    COLACE    30 tablet    Take 100 mg by mouth daily (while using narcotics)        doxylamine 25 MG Tabs tablet    UNISOM    30 each    Take 1 tablet (25 mg) by mouth At Bedtime        HYDROcodone-acetaminophen 5-325 MG per tablet    NORCO    20 tablet    Take 1 tablet by mouth every 6 hours as needed for moderate to severe pain    Acute post-operative pain       ibuprofen 600 MG tablet    ADVIL/MOTRIN    90 tablet    Take 1 tablet (600 mg) by mouth every 6 hours as needed for moderate pain    Acute post-operative pain       * oxyCODONE-acetaminophen 5-325 MG per tablet    PERCOCET    30 tablet    Take 1-2 tablets by mouth every 6 hours as needed for pain        * oxyCODONE-acetaminophen 5-325 MG per tablet    PERCOCET    15 tablet    Take 1-2 tablets by mouth every 4 hours as needed for pain (moderate to severe)    Biliary colic       prochlorperazine 10 MG tablet    COMPAZINE    20 tablet    Take 1 tablet (10 mg) by mouth every 6 hours as needed for nausea or vomiting        * Notice:  This list has 2 medication(s) that are the same as other medications prescribed for you. Read the directions carefully, and ask your doctor or other care provider to review them with you.

## 2018-06-12 NOTE — PROGRESS NOTES
Please see full imaging report from ViewPoint program under imaging tab.      Shiela Avendaño MD  Maternal Fetal Medicine

## 2018-06-15 ENCOUNTER — HOSPITAL ENCOUNTER (OUTPATIENT)
Facility: CLINIC | Age: 33
Setting detail: OBSERVATION
Discharge: HOME | End: 2018-06-16
Attending: EMERGENCY MEDICINE | Admitting: OBSTETRICS & GYNECOLOGY
Payer: COMMERCIAL

## 2018-06-15 ENCOUNTER — APPOINTMENT (OUTPATIENT)
Dept: GENERAL RADIOLOGY | Facility: CLINIC | Age: 33
End: 2018-06-15
Attending: EMERGENCY MEDICINE
Payer: COMMERCIAL

## 2018-06-15 DIAGNOSIS — O26.90 COMPLICATION OF PREGNANCY, ANTEPARTUM, UNSPECIFIED TRIMESTER: ICD-10-CM
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
## 2018-06-15 LAB
ABO + RH BLD: NORMAL
ABO + RH BLD: NORMAL
ALBUMIN SERPL-MCNC: 3 G/DL (ref 3.4–5)
ALP SERPL-CCNC: 74 U/L (ref 40–150)
ALT SERPL W P-5'-P-CCNC: 30 U/L (ref 0–50)
ANION GAP SERPL CALCULATED.3IONS-SCNC: 8 MMOL/L (ref 3–14)
AST SERPL W P-5'-P-CCNC: 32 U/L (ref 0–45)
BASOPHILS # BLD AUTO: 0 10E9/L (ref 0–0.2)
BASOPHILS NFR BLD AUTO: 0.3 %
BILIRUB DIRECT SERPL-MCNC: <0.1 MG/DL (ref 0–0.2)
BILIRUB SERPL-MCNC: 0.6 MG/DL (ref 0.2–1.3)
BLOOD BANK CMNT PATIENT-IMP: NORMAL
BLOOD BANK CMNT PATIENT-IMP: NORMAL
BUN SERPL-MCNC: 7 MG/DL (ref 7–30)
CALCIUM SERPL-MCNC: 8.4 MG/DL (ref 8.5–10.1)
CHLORIDE SERPL-SCNC: 108 MMOL/L (ref 94–109)
CO2 SERPL-SCNC: 23 MMOL/L (ref 20–32)
CREAT SERPL-MCNC: 0.46 MG/DL (ref 0.52–1.04)
DIFFERENTIAL METHOD BLD: ABNORMAL
EOSINOPHIL # BLD AUTO: 0.1 10E9/L (ref 0–0.7)
EOSINOPHIL NFR BLD AUTO: 1.4 %
ERYTHROCYTE [DISTWIDTH] IN BLOOD BY AUTOMATED COUNT: 12.4 % (ref 10–15)
FETAL CELL SCN BLD QL ROSETTE: NORMAL
GFR SERPL CREATININE-BSD FRML MDRD: >90 ML/MIN/1.7M2
GLUCOSE BLDC GLUCOMTR-MCNC: 107 MG/DL (ref 70–99)
GLUCOSE BLDC GLUCOMTR-MCNC: 125 MG/DL (ref 70–99)
GLUCOSE SERPL-MCNC: 82 MG/DL (ref 70–99)
HCT VFR BLD AUTO: 34.4 % (ref 35–47)
HGB BLD-MCNC: 11.8 G/DL (ref 11.7–15.7)
IMM GRANULOCYTES # BLD: 0 10E9/L (ref 0–0.4)
IMM GRANULOCYTES NFR BLD: 0.7 %
LYMPHOCYTES # BLD AUTO: 1.4 10E9/L (ref 0.8–5.3)
LYMPHOCYTES NFR BLD AUTO: 24.2 %
MCH RBC QN AUTO: 31.6 PG (ref 26.5–33)
MCHC RBC AUTO-ENTMCNC: 34.3 G/DL (ref 31.5–36.5)
MCV RBC AUTO: 92 FL (ref 78–100)
MONOCYTES # BLD AUTO: 0.6 10E9/L (ref 0–1.3)
MONOCYTES NFR BLD AUTO: 9.5 %
NEUTROPHILS # BLD AUTO: 3.7 10E9/L (ref 1.6–8.3)
NEUTROPHILS NFR BLD AUTO: 63.9 %
NRBC # BLD AUTO: 0 10*3/UL
NRBC BLD AUTO-RTO: 0 /100
PLATELET # BLD AUTO: 148 10E9/L (ref 150–450)
POTASSIUM SERPL-SCNC: 3.5 MMOL/L (ref 3.4–5.3)
PROT SERPL-MCNC: 6.5 G/DL (ref 6.8–8.8)
RBC # BLD AUTO: 3.74 10E12/L (ref 3.8–5.2)
RH IG VIALS RECOM PATIENT: NORMAL
SODIUM SERPL-SCNC: 139 MMOL/L (ref 133–144)
WBC # BLD AUTO: 5.8 10E9/L (ref 4–11)

## 2018-06-15 PROCEDURE — 96374 THER/PROPH/DIAG INJ IV PUSH: CPT

## 2018-06-15 PROCEDURE — 86900 BLOOD TYPING SEROLOGIC ABO: CPT | Performed by: EMERGENCY MEDICINE

## 2018-06-15 PROCEDURE — 72100 X-RAY EXAM L-S SPINE 2/3 VWS: CPT

## 2018-06-15 PROCEDURE — 86901 BLOOD TYPING SEROLOGIC RH(D): CPT | Performed by: EMERGENCY MEDICINE

## 2018-06-15 PROCEDURE — 73502 X-RAY EXAM HIP UNI 2-3 VIEWS: CPT

## 2018-06-15 PROCEDURE — 99285 EMERGENCY DEPT VISIT HI MDM: CPT | Mod: 25

## 2018-06-15 PROCEDURE — 85460 HEMOGLOBIN FETAL: CPT | Performed by: EMERGENCY MEDICINE

## 2018-06-15 PROCEDURE — 80048 BASIC METABOLIC PNL TOTAL CA: CPT | Performed by: EMERGENCY MEDICINE

## 2018-06-15 PROCEDURE — G0378 HOSPITAL OBSERVATION PER HR: HCPCS

## 2018-06-15 PROCEDURE — 80076 HEPATIC FUNCTION PANEL: CPT | Performed by: EMERGENCY MEDICINE

## 2018-06-15 PROCEDURE — 25000132 ZZH RX MED GY IP 250 OP 250 PS 637: Performed by: OBSTETRICS & GYNECOLOGY

## 2018-06-15 PROCEDURE — 25000132 ZZH RX MED GY IP 250 OP 250 PS 637: Performed by: EMERGENCY MEDICINE

## 2018-06-15 PROCEDURE — 85025 COMPLETE CBC W/AUTO DIFF WBC: CPT | Performed by: EMERGENCY MEDICINE

## 2018-06-15 PROCEDURE — 96375 TX/PRO/DX INJ NEW DRUG ADDON: CPT

## 2018-06-15 PROCEDURE — 00000146 ZZHCL STATISTIC GLUCOSE BY METER IP

## 2018-06-15 PROCEDURE — 85461 HEMOGLOBIN FETAL: CPT | Performed by: EMERGENCY MEDICINE

## 2018-06-15 RX ORDER — IBUPROFEN 600 MG/1
600 TABLET, FILM COATED ORAL EVERY 6 HOURS PRN
Status: ON HOLD | COMMUNITY
End: 2018-06-16

## 2018-06-15 RX ORDER — HYDROMORPHONE HYDROCHLORIDE 1 MG/ML
0.5 INJECTION, SOLUTION INTRAMUSCULAR; INTRAVENOUS; SUBCUTANEOUS
Status: DISCONTINUED | OUTPATIENT
Start: 2018-06-15 | End: 2018-06-16 | Stop reason: HOSPADM

## 2018-06-15 RX ORDER — ACETAMINOPHEN 500 MG
1000 TABLET ORAL ONCE
Status: COMPLETED | OUTPATIENT
Start: 2018-06-15 | End: 2018-06-15

## 2018-06-15 RX ORDER — METOCLOPRAMIDE HYDROCHLORIDE 5 MG/ML
10 INJECTION INTRAMUSCULAR; INTRAVENOUS ONCE
Status: DISCONTINUED | OUTPATIENT
Start: 2018-06-15 | End: 2018-06-16 | Stop reason: HOSPADM

## 2018-06-15 RX ORDER — ACETAMINOPHEN 500 MG
1000 TABLET ORAL EVERY 4 HOURS PRN
Status: DISCONTINUED | OUTPATIENT
Start: 2018-06-15 | End: 2018-06-16 | Stop reason: HOSPADM

## 2018-06-15 RX ORDER — ONDANSETRON 2 MG/ML
4 INJECTION INTRAMUSCULAR; INTRAVENOUS EVERY 6 HOURS PRN
Status: DISCONTINUED | OUTPATIENT
Start: 2018-06-15 | End: 2018-06-16 | Stop reason: HOSPADM

## 2018-06-15 RX ADMIN — ACETAMINOPHEN 1000 MG: 500 TABLET, FILM COATED ORAL at 21:42

## 2018-06-15 RX ADMIN — ACETAMINOPHEN 1000 MG: 500 TABLET, FILM COATED ORAL at 14:58

## 2018-06-15 ASSESSMENT — ENCOUNTER SYMPTOMS
NAUSEA: 1
WOUND: 1
ABDOMINAL PAIN: 1
NECK PAIN: 0
BACK PAIN: 1
VOMITING: 1
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
## 2018-06-15 NOTE — IP AVS SNAPSHOT
[facility] Labor and Delivery    [address]    [facility] [address]    Phone:  [phone]    Fax:  [phone]                                       After Visit Summary   6/15/2018    Lorna Kenyon    MRN: [id number]           After Visit Summary Signature Page     I have received my discharge instructions, and my questions have been answered. I have discussed any challenges I see with this plan with the nurse or doctor.    ..........................................................................................................................................  Patient/Patient Representative Signature      ..........................................................................................................................................  Patient Representative Print Name and Relationship to Patient    ..................................................               ................................................  Date                                            Time    ..........................................................................................................................................  Reviewed by Signature/Title    ...................................................              ..............................................  Date                                                            Time
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
## 2018-06-15 NOTE — PROVIDER NOTIFICATION
06/15/18 1625   Provider Notification   Provider Name/Title Dr. FE Guerrero   Method of Notification Phone   Request Evaluate - Remote   Notification Reason Status Update   Md notified that pt is now on 4th floor and resting. Orders received for continuous monitoring, May have tylenol q4hr, ok for regular diet and then also fasting blood sugar and 2hr post prandial.  Lee Nino collected. Will continue to monitor.
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
## 2018-06-16 VITALS
OXYGEN SATURATION: 98 % | SYSTOLIC BLOOD PRESSURE: 108 MMHG | TEMPERATURE: 98 F | DIASTOLIC BLOOD PRESSURE: 59 MMHG | RESPIRATION RATE: 16 BRPM
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
## 2018-06-16 LAB
GLUCOSE BLDC GLUCOMTR-MCNC: 80 MG/DL (ref 70–99)
HGB BLD-MCNC: 12 G/DL (ref 11.7–15.7)

## 2018-06-16 PROCEDURE — 36415 COLL VENOUS BLD VENIPUNCTURE: CPT | Performed by: OBSTETRICS & GYNECOLOGY

## 2018-06-16 PROCEDURE — 25000132 ZZH RX MED GY IP 250 OP 250 PS 637: Performed by: OBSTETRICS & GYNECOLOGY

## 2018-06-16 PROCEDURE — 85018 HEMOGLOBIN: CPT | Performed by: OBSTETRICS & GYNECOLOGY

## 2018-06-16 PROCEDURE — 00000146 ZZHCL STATISTIC GLUCOSE BY METER IP

## 2018-06-16 PROCEDURE — G0378 HOSPITAL OBSERVATION PER HR: HCPCS

## 2018-06-16 RX ADMIN — ACETAMINOPHEN 1000 MG: 500 TABLET, FILM COATED ORAL at 09:08

## 2018-06-16 RX ADMIN — ACETAMINOPHEN 1000 MG: 500 TABLET, FILM COATED ORAL at 02:12
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
## 2018-06-16 NOTE — PLAN OF CARE
Reviewed signs and symptoms of when to call the doctor. Pt feels baby moving and is not feeling cramping or contractions. Irritability improves after voiding, encouraged pt to void more frequently. Pt denies any concerns at time of discharge. Only concern at discharge is getting a note regarding being unable to look for employment for the next 1-2 weeks. Repeated instructions that Dr Franks had given them. Pt to call the clinic on Monday (address and phone number given) to get typed letter on their letterhead
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
## 2018-06-19 LAB — HGB F BLD QL KLEIH BETKE: NORMAL

## 2018-07-02 ENCOUNTER — HOSPITAL ENCOUNTER (EMERGENCY)
Facility: CLINIC | Age: 33
Discharge: HOME | End: 2018-07-02
Attending: EMERGENCY MEDICINE | Admitting: EMERGENCY MEDICINE
Payer: COMMERCIAL

## 2018-07-02 ENCOUNTER — APPOINTMENT (OUTPATIENT)
Dept: GENERAL RADIOLOGY | Facility: CLINIC | Age: 33
End: 2018-07-02
Attending: EMERGENCY MEDICINE
Payer: COMMERCIAL

## 2018-07-02 VITALS
DIASTOLIC BLOOD PRESSURE: 55 MMHG | OXYGEN SATURATION: 97 % | RESPIRATION RATE: 16 BRPM | SYSTOLIC BLOOD PRESSURE: 93 MMHG | TEMPERATURE: 98.2 F

## 2018-07-02 DIAGNOSIS — J20.8 ACUTE VIRAL BRONCHITIS: ICD-10-CM

## 2018-07-02 LAB
ALBUMIN UR-MCNC: NEGATIVE MG/DL
ANION GAP SERPL CALCULATED.3IONS-SCNC: 10 MMOL/L (ref 3–14)
APPEARANCE UR: CLEAR
BACTERIA #/AREA URNS HPF: ABNORMAL /HPF
BASOPHILS # BLD AUTO: 0 10E9/L (ref 0–0.2)
BASOPHILS NFR BLD AUTO: 0.3 %
BILIRUB UR QL STRIP: NEGATIVE
BUN SERPL-MCNC: 4 MG/DL (ref 7–30)
CALCIUM SERPL-MCNC: 8.7 MG/DL (ref 8.5–10.1)
CHLORIDE SERPL-SCNC: 106 MMOL/L (ref 94–109)
CO2 SERPL-SCNC: 22 MMOL/L (ref 20–32)
COLOR UR AUTO: ABNORMAL
CREAT SERPL-MCNC: 0.38 MG/DL (ref 0.52–1.04)
DIFFERENTIAL METHOD BLD: ABNORMAL
EOSINOPHIL # BLD AUTO: 0.3 10E9/L (ref 0–0.7)
EOSINOPHIL NFR BLD AUTO: 3.2 %
ERYTHROCYTE [DISTWIDTH] IN BLOOD BY AUTOMATED COUNT: 12.8 % (ref 10–15)
GFR SERPL CREATININE-BSD FRML MDRD: >90 ML/MIN/1.7M2
GLUCOSE SERPL-MCNC: 75 MG/DL (ref 70–99)
GLUCOSE UR STRIP-MCNC: NEGATIVE MG/DL
HCT VFR BLD AUTO: 35.4 % (ref 35–47)
HGB BLD-MCNC: 12.1 G/DL (ref 11.7–15.7)
HGB UR QL STRIP: NEGATIVE
IMM GRANULOCYTES # BLD: 0.1 10E9/L (ref 0–0.4)
IMM GRANULOCYTES NFR BLD: 0.8 %
KETONES UR STRIP-MCNC: NEGATIVE MG/DL
LEUKOCYTE ESTERASE UR QL STRIP: NEGATIVE
LYMPHOCYTES # BLD AUTO: 1 10E9/L (ref 0.8–5.3)
LYMPHOCYTES NFR BLD AUTO: 11.8 %
MCH RBC QN AUTO: 31.2 PG (ref 26.5–33)
MCHC RBC AUTO-ENTMCNC: 34.2 G/DL (ref 31.5–36.5)
MCV RBC AUTO: 91 FL (ref 78–100)
MONOCYTES # BLD AUTO: 0.9 10E9/L (ref 0–1.3)
MONOCYTES NFR BLD AUTO: 10.2 %
NEUTROPHILS # BLD AUTO: 6.5 10E9/L (ref 1.6–8.3)
NEUTROPHILS NFR BLD AUTO: 73.7 %
NITRATE UR QL: NEGATIVE
NRBC # BLD AUTO: 0 10*3/UL
NRBC BLD AUTO-RTO: 0 /100
PH UR STRIP: 7 PH (ref 5–7)
PLATELET # BLD AUTO: 131 10E9/L (ref 150–450)
POTASSIUM SERPL-SCNC: 3.9 MMOL/L (ref 3.4–5.3)
RBC # BLD AUTO: 3.88 10E12/L (ref 3.8–5.2)
RBC #/AREA URNS AUTO: <1 /HPF (ref 0–2)
SODIUM SERPL-SCNC: 138 MMOL/L (ref 133–144)
SOURCE: ABNORMAL
SP GR UR STRIP: 1 (ref 1–1.03)
SQUAMOUS #/AREA URNS AUTO: <1 /HPF (ref 0–1)
UROBILINOGEN UR STRIP-MCNC: 0 MG/DL (ref 0–2)
WBC # BLD AUTO: 8.8 10E9/L (ref 4–11)
WBC #/AREA URNS AUTO: <1 /HPF (ref 0–5)

## 2018-07-02 PROCEDURE — 99284 EMERGENCY DEPT VISIT MOD MDM: CPT | Mod: 25

## 2018-07-02 PROCEDURE — 85025 COMPLETE CBC W/AUTO DIFF WBC: CPT | Performed by: EMERGENCY MEDICINE

## 2018-07-02 PROCEDURE — 96360 HYDRATION IV INFUSION INIT: CPT

## 2018-07-02 PROCEDURE — 80048 BASIC METABOLIC PNL TOTAL CA: CPT | Performed by: EMERGENCY MEDICINE

## 2018-07-02 PROCEDURE — 71046 X-RAY EXAM CHEST 2 VIEWS: CPT

## 2018-07-02 PROCEDURE — 25000132 ZZH RX MED GY IP 250 OP 250 PS 637: Performed by: EMERGENCY MEDICINE

## 2018-07-02 PROCEDURE — 25000128 H RX IP 250 OP 636: Performed by: EMERGENCY MEDICINE

## 2018-07-02 PROCEDURE — 81001 URINALYSIS AUTO W/SCOPE: CPT | Performed by: EMERGENCY MEDICINE

## 2018-07-02 RX ORDER — ACETAMINOPHEN 500 MG
500-1000 TABLET ORAL EVERY 6 HOURS PRN
Qty: 60 TABLET | Refills: 0 | Status: SHIPPED | OUTPATIENT
Start: 2018-07-02 | End: 2018-07-09

## 2018-07-02 RX ORDER — ACETAMINOPHEN 500 MG
1000 TABLET ORAL ONCE
Status: COMPLETED | OUTPATIENT
Start: 2018-07-02 | End: 2018-07-02

## 2018-07-02 RX ADMIN — SODIUM CHLORIDE, POTASSIUM CHLORIDE, SODIUM LACTATE AND CALCIUM CHLORIDE 500 ML: 600; 310; 30; 20 INJECTION, SOLUTION INTRAVENOUS at 09:53

## 2018-07-02 RX ADMIN — ACETAMINOPHEN 1000 MG: 500 TABLET, FILM COATED ORAL at 09:17

## 2018-07-02 ASSESSMENT — ENCOUNTER SYMPTOMS
FEVER: 1
SHORTNESS OF BREATH: 1
ABDOMINAL PAIN: 0
DYSURIA: 0
FLANK PAIN: 1
DIARRHEA: 0
SORE THROAT: 1
COUGH: 1
MYALGIAS: 1
HEADACHES: 1

## 2018-07-02 NOTE — ED AVS SNAPSHOT
[facility] Emergency Department    [address]    [facility] [address]    Phone:  [phone]    Fax:  [phone]                                       Lorna Kenyon   MRN: [id number]    Department:  [facility] Emergency Department   Date of Visit:  7/2/2018           After Visit Summary Signature Page     I have received my discharge instructions, and my questions have been answered. I have discussed any challenges I see with this plan with the nurse or doctor.    ..........................................................................................................................................  Patient/Patient Representative Signature      ..........................................................................................................................................  Patient Representative Print Name and Relationship to Patient    ..................................................               ................................................  Date                                            Time    ..........................................................................................................................................  Reviewed by Signature/Title    ...................................................              ..............................................  Date                                                            Time
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
## 2018-07-02 NOTE — DISCHARGE INSTRUCTIONS
Please make an appointment to follow up with your primary care provider in 2-3 days if not improving.        Viral Bronchitis (Adult)    You have a viral bronchitis. Bronchitis is inflammation and swelling of the lining of the lungs. This is often caused by an infection. Symptoms include a dry, hacking cough that is worse at night. The cough may bring up yellow-green mucus. You may also feel short of breath or wheeze. Other symptoms may include tiredness, chest discomfort, and chills.  Bronchitis that is caused by a virus is not treated with antibiotics. Instead, medicines may be given to help relieve symptoms. Symptoms can last up to 2 weeks, although the cough may last much longer.  This illness is contagious during the first few days and is spread through the air by coughing and sneezing, or by direct contact (touching the sick person and then touching your own eyes, nose, or mouth).  Most viral illnesses resolve within 10 to 14 days with rest and simple home remedies, although they may sometimes last for several weeks.  Home care    If symptoms are severe, rest at home for the first 2 to 3 days. When you go back to your usual activities, don't let yourself get too tired.    Do not smoke. Also avoid being exposed to secondhand smoke.    You may use over-the-counter medicine to control fever or pain, unless another pain medicine was prescribed. If you have chronic liver or kidney disease or have ever had a stomach ulcer or gastrointestinal bleeding, talk with your healthcare provider before using these medicines. Also talk to your provider if you are taking medicine to prevent blood clots. Aspirin should never be given to anyone younger than 18 years of age who is ill with a viral infection or fever. It may cause severe liver or brain damage.    Your appetite may be poor, so a light diet is fine. Avoid dehydration by drinking 6 to 8 glasses of fluids per day (such as water, soft drinks, sports drinks, juices, tea,  or soup). Extra fluids will help loosen secretions in the nose and lungs.    Over-the-counter cough, cold, and sore-throat medicines will not shorten the length of the illness, but they may help to reduce symptoms. Don't use decongestants if you have high blood pressure.  Follow-up care  Follow up with your healthcare provider, or as advised. If you had an X-ray or ECG (electrocardiogram), a specialist will review it. You will be notified of any new findings that may affect your care.  If you are age 65 or older, or if you have a chronic lung disease or condition that affects your immune system, or you smoke, ask your healthcare provider about getting a pneumococcal vaccine and a yearly flu shot (influenza vaccine).  When to seek medical advice  Call your healthcare provider right away if any of these occur:    Fever of 100.4 F (38 C) or higher, or as directed by your healthcare provider    Coughing up increased amounts of colored sputum    Weakness, drowsiness, headache, facial pain, ear pain, or a stiff neck  Call 911  Call 911 if any of these occur:    Coughing up blood    Worsening weakness, drowsiness, headache, or stiff neck    Trouble breathing, wheezing, or pain with breathing  Date Last Reviewed: 9/13/2015 2000-2017 [employer]. [address] [place] [address], [place] [address]. All rights reserved. This information is not intended as a substitute for professional medical care. Always follow your healthcare professional's instructions.
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
## 2018-07-13 LAB — GROUP B STREP PCR: NEGATIVE

## 2018-08-04 ENCOUNTER — ANESTHESIA EVENT (OUTPATIENT)
Dept: OBGYN | Facility: CLINIC | Age: 33
End: 2018-08-04
Payer: COMMERCIAL

## 2018-08-04 ENCOUNTER — HOSPITAL ENCOUNTER (INPATIENT)
Facility: CLINIC | Age: 33
LOS: 4 days | Discharge: HOME | End: 2018-08-08
Attending: OBSTETRICS & GYNECOLOGY | Admitting: OBSTETRICS & GYNECOLOGY
Payer: COMMERCIAL

## 2018-08-04 ENCOUNTER — ANESTHESIA (OUTPATIENT)
Dept: OBGYN | Facility: CLINIC | Age: 33
End: 2018-08-04
Payer: COMMERCIAL

## 2018-08-04 LAB
ABO + RH BLD: NORMAL
ABO + RH BLD: NORMAL
BLD GP AB SCN SERPL QL: NORMAL
BLOOD BANK CMNT PATIENT-IMP: NORMAL
GLUCOSE BLDC GLUCOMTR-MCNC: 67 MG/DL (ref 70–99)
GLUCOSE BLDC GLUCOMTR-MCNC: 97 MG/DL (ref 70–99)
HGB BLD-MCNC: 13.9 G/DL (ref 11.7–15.7)
SPECIMEN EXP DATE BLD: NORMAL

## 2018-08-04 PROCEDURE — 86780 TREPONEMA PALLIDUM: CPT | Performed by: OBSTETRICS & GYNECOLOGY

## 2018-08-04 PROCEDURE — 25000132 ZZH RX MED GY IP 250 OP 250 PS 637: Performed by: OBSTETRICS & GYNECOLOGY

## 2018-08-04 PROCEDURE — 25000128 H RX IP 250 OP 636: Performed by: OBSTETRICS & GYNECOLOGY

## 2018-08-04 PROCEDURE — 86850 RBC ANTIBODY SCREEN: CPT | Performed by: OBSTETRICS & GYNECOLOGY

## 2018-08-04 PROCEDURE — 00000146 ZZHCL STATISTIC GLUCOSE BY METER IP

## 2018-08-04 PROCEDURE — 37000011 ZZH ANESTHESIA WARD SERVICE

## 2018-08-04 PROCEDURE — 86901 BLOOD TYPING SEROLOGIC RH(D): CPT | Performed by: OBSTETRICS & GYNECOLOGY

## 2018-08-04 PROCEDURE — 85018 HEMOGLOBIN: CPT | Performed by: OBSTETRICS & GYNECOLOGY

## 2018-08-04 PROCEDURE — 12000029 ZZH R&B OB INTERMEDIATE

## 2018-08-04 PROCEDURE — 86900 BLOOD TYPING SEROLOGIC ABO: CPT | Performed by: OBSTETRICS & GYNECOLOGY

## 2018-08-04 PROCEDURE — 40000671 ZZH STATISTIC ANESTHESIA CASE

## 2018-08-04 RX ORDER — ACETAMINOPHEN 325 MG/1
650 TABLET ORAL EVERY 4 HOURS PRN
Status: DISCONTINUED | OUTPATIENT
Start: 2018-08-04 | End: 2018-08-08 | Stop reason: CLARIF

## 2018-08-04 RX ORDER — METHYLERGONOVINE MALEATE 0.2 MG/ML
200 INJECTION INTRAVENOUS
Status: DISCONTINUED | OUTPATIENT
Start: 2018-08-04 | End: 2018-08-08 | Stop reason: CLARIF

## 2018-08-04 RX ORDER — ONDANSETRON 2 MG/ML
4 INJECTION INTRAMUSCULAR; INTRAVENOUS EVERY 6 HOURS PRN
Status: DISCONTINUED | OUTPATIENT
Start: 2018-08-04 | End: 2018-08-08 | Stop reason: CLARIF

## 2018-08-04 RX ORDER — OXYCODONE AND ACETAMINOPHEN 5; 325 MG/1; MG/1
1 TABLET ORAL
Status: DISCONTINUED | OUTPATIENT
Start: 2018-08-04 | End: 2018-08-08 | Stop reason: CLARIF

## 2018-08-04 RX ORDER — NALOXONE HYDROCHLORIDE 0.4 MG/ML
.1-.4 INJECTION, SOLUTION INTRAMUSCULAR; INTRAVENOUS; SUBCUTANEOUS
Status: DISCONTINUED | OUTPATIENT
Start: 2018-08-04 | End: 2018-08-05 | Stop reason: HOSPADM

## 2018-08-04 RX ORDER — CARBOPROST TROMETHAMINE 250 UG/ML
250 INJECTION, SOLUTION INTRAMUSCULAR
Status: DISCONTINUED | OUTPATIENT
Start: 2018-08-04 | End: 2018-08-08 | Stop reason: CLARIF

## 2018-08-04 RX ORDER — DEXTROSE MONOHYDRATE 25 G/50ML
25-50 INJECTION, SOLUTION INTRAVENOUS
Status: DISCONTINUED | OUTPATIENT
Start: 2018-08-04 | End: 2018-08-05

## 2018-08-04 RX ORDER — EPHEDRINE SULFATE 50 MG/ML
5 INJECTION, SOLUTION INTRAMUSCULAR; INTRAVENOUS; SUBCUTANEOUS
Status: DISCONTINUED | OUTPATIENT
Start: 2018-08-04 | End: 2018-08-05 | Stop reason: HOSPADM

## 2018-08-04 RX ORDER — SCOLOPAMINE TRANSDERMAL SYSTEM 1 MG/1
1 PATCH, EXTENDED RELEASE TRANSDERMAL ONCE
Status: DISCONTINUED | OUTPATIENT
Start: 2018-08-04 | End: 2018-08-05 | Stop reason: HOSPADM

## 2018-08-04 RX ORDER — OXYTOCIN/0.9 % SODIUM CHLORIDE 30/500 ML
100-340 PLASTIC BAG, INJECTION (ML) INTRAVENOUS CONTINUOUS PRN
Status: DISCONTINUED | OUTPATIENT
Start: 2018-08-04 | End: 2018-08-08 | Stop reason: CLARIF

## 2018-08-04 RX ORDER — NALOXONE HYDROCHLORIDE 0.4 MG/ML
.1-.4 INJECTION, SOLUTION INTRAMUSCULAR; INTRAVENOUS; SUBCUTANEOUS
Status: DISCONTINUED | OUTPATIENT
Start: 2018-08-04 | End: 2018-08-08 | Stop reason: CLARIF

## 2018-08-04 RX ORDER — NICOTINE POLACRILEX 4 MG
15-30 LOZENGE BUCCAL
Status: DISCONTINUED | OUTPATIENT
Start: 2018-08-04 | End: 2018-08-05

## 2018-08-04 RX ORDER — FENTANYL CITRATE 50 UG/ML
50-100 INJECTION, SOLUTION INTRAMUSCULAR; INTRAVENOUS
Status: DISCONTINUED | OUTPATIENT
Start: 2018-08-04 | End: 2018-08-08 | Stop reason: CLARIF

## 2018-08-04 RX ORDER — NALBUPHINE HYDROCHLORIDE 10 MG/ML
2.5-5 INJECTION, SOLUTION INTRAMUSCULAR; INTRAVENOUS; SUBCUTANEOUS EVERY 6 HOURS PRN
Status: DISCONTINUED | OUTPATIENT
Start: 2018-08-04 | End: 2018-08-05 | Stop reason: HOSPADM

## 2018-08-04 RX ORDER — SODIUM CHLORIDE, SODIUM LACTATE, POTASSIUM CHLORIDE, CALCIUM CHLORIDE 600; 310; 30; 20 MG/100ML; MG/100ML; MG/100ML; MG/100ML
INJECTION, SOLUTION INTRAVENOUS CONTINUOUS
Status: DISCONTINUED | OUTPATIENT
Start: 2018-08-04 | End: 2018-08-08 | Stop reason: CLARIF

## 2018-08-04 RX ORDER — CALCIUM CARBONATE 500 MG/1
1000 TABLET, CHEWABLE ORAL
Status: DISCONTINUED | OUTPATIENT
Start: 2018-08-04 | End: 2018-08-08 | Stop reason: HOSPADM

## 2018-08-04 RX ORDER — SODIUM CHLORIDE 9 MG/ML
INJECTION, SOLUTION INTRAVENOUS CONTINUOUS
Status: DISCONTINUED | OUTPATIENT
Start: 2018-08-04 | End: 2018-08-08 | Stop reason: CLARIF

## 2018-08-04 RX ORDER — OXYTOCIN/0.9 % SODIUM CHLORIDE 30/500 ML
1-24 PLASTIC BAG, INJECTION (ML) INTRAVENOUS CONTINUOUS
Status: DISCONTINUED | OUTPATIENT
Start: 2018-08-04 | End: 2018-08-08 | Stop reason: CLARIF

## 2018-08-04 RX ORDER — IBUPROFEN 800 MG/1
800 TABLET, FILM COATED ORAL
Status: DISCONTINUED | OUTPATIENT
Start: 2018-08-04 | End: 2018-08-08 | Stop reason: CLARIF

## 2018-08-04 RX ORDER — BUPIVACAINE HCL/0.9 % NACL/PF 0.125 %
PLASTIC BAG, INJECTION (ML) EPIDURAL CONTINUOUS
Status: DISCONTINUED | OUTPATIENT
Start: 2018-08-04 | End: 2018-08-05 | Stop reason: HOSPADM

## 2018-08-04 RX ORDER — DEXTROSE, SODIUM CHLORIDE, SODIUM LACTATE, POTASSIUM CHLORIDE, AND CALCIUM CHLORIDE 5; .6; .31; .03; .02 G/100ML; G/100ML; G/100ML; G/100ML; G/100ML
INJECTION, SOLUTION INTRAVENOUS CONTINUOUS
Status: DISCONTINUED | OUTPATIENT
Start: 2018-08-04 | End: 2018-08-08 | Stop reason: CLARIF

## 2018-08-04 RX ORDER — OXYTOCIN 10 [USP'U]/ML
10 INJECTION, SOLUTION INTRAMUSCULAR; INTRAVENOUS
Status: DISCONTINUED | OUTPATIENT
Start: 2018-08-04 | End: 2018-08-08 | Stop reason: CLARIF

## 2018-08-04 RX ADMIN — SODIUM CHLORIDE, POTASSIUM CHLORIDE, SODIUM LACTATE AND CALCIUM CHLORIDE 1000 ML: 600; 310; 30; 20 INJECTION, SOLUTION INTRAVENOUS at 21:13

## 2018-08-04 RX ADMIN — CALCIUM CARBONATE (ANTACID) CHEW TAB 500 MG 1000 MG: 500 CHEW TAB at 23:08

## 2018-08-04 NOTE — IP AVS SNAPSHOT
[facility]partum    [address]    [facility] [address]    Phone:  [phone]    Fax:  [phone]                                       After Visit Summary   8/4/2018    Lorna Kenyon    MRN: [id number]           After Visit Summary Signature Page     I have received my discharge instructions, and my questions have been answered. I have discussed any challenges I see with this plan with the nurse or doctor.    ..........................................................................................................................................  Patient/Patient Representative Signature      ..........................................................................................................................................  Patient Representative Print Name and Relationship to Patient    ..................................................               ................................................  Date                                            Time    ..........................................................................................................................................  Reviewed by Signature/Title    ...................................................              ..............................................  Date                                                            Time

## 2018-08-04 NOTE — IP AVS SNAPSHOT
MRN:[id number]                      After Visit Summary   2018    Lorna Kenyon    MRN: [id number]           Thank you!     Thank you for choosing [facility] for your care. Our goal is always to provide you with excellent care. Hearing back from our patients is one way we can continue to improve our services. Please take a few minutes to complete the written survey that you may receive in the mail after you visit. If you would like to speak to someone directly about your visit please contact Patient Relations at [phone]. Thank you!          Patient Information     Date Of Birth          1985        Designated Caregiver       Most Recent Value    Caregiver    Will someone help with your care after discharge? no      About your hospital stay     You were admitted on:  2018 You last received care in the:  [facility] Postpartum    You were discharged on:  2018        Reason for your hospital stay       Maternity care                  Who to Call     For medical emergencies, please call 911.  For non-urgent questions about your medical care, please call your primary care provider or clinic, None  For questions related to your surgery, please call your surgery clinic        Attending Provider     Provider Specialty    Bao Franks MD OB/Gyn    Marjan Bruner MD OB/Gyn       Primary Care Provider Fax #    [facility] [phone]      After Care Instructions     Activity       Review discharge instructions            Diet       Resume previous diet            Discharge Instructions - Postpartum visit       Schedule postpartum visit with your provider and return to clinic in 6 weeks.                  Further instructions from your care team          Birth Discharge Instructions: Molly Oteroqabadka    Sharma chirag dumont 10 roodal 6 asbuuc yashira bain. Dave mark  markaad u baahantahay.     Sharma wadin gaadhi markaad qaadanayso kaniiniyada xanunka ee uu dhakhtarkaagu kuu qoro. Waxaad wadi kartaa gadhi haddii aad qaadanayso kaniiniyada xanuunka ee koontarka.     Lama ogala jimicsi adag ama howl culus 6 asbuuc. Sharma samayn wax dhib ku keeni maegan meesha qalliinka lagu sameeeyay.    Sharma kendra jiidin adoo aad u doconaya markaad isticmaalayso musqusha. Kooxdaada daryeelka ayaa waxay ku qori doonaan wax saxarada jilciya haddii ay dhibaato kaa haysato saxarada oo adag (caloosha oo fadhida) .    Ka taxadar meesha la qalay:    Meesha la qalay nadiif sharma ahaato hana qallalnaato    Sharma ku buuxin meesha la qalay biyo. Dabaal ama tuubooyinka biyaha kulul lama ogala ilaa uu qalliinku rosie ahaanba bogsado. Waxaad isku furi kartaa tuubada qabayska haddii heerka biyuhu ay ka hoooseeyaan meesha qalliinka.    Dhaqidda kadib, meesha qalliinka si fican u qalaji. Sidoo kale ku desiree qalajinta maqarka u dhow meesha qaliinka ee ay istaaban karaan.     Ha isticmaalin wax subkid, jeel, amisha, looshin ama wax kale oo aad mariso meesha qalliinka.    Dharkaaga u xiri qaab wanaagsan si aysan cadaadis ugu samayn meesha la qalay  (tusaale ahaan fiiri say u kendra jiidayso kastuumadaadu)     Haddii aad leedahay Qodabada Latolay, faraha ka qaad tolmada. Iyagaa iskood isaga dhacaya ama waxaad siibi kartaa regan asbuuc kadib.    Waxaad arki kartaa cadad leni oo michelle cad ah ama binki ah chau stringer. U taga bixiyaaga [facility] caafimaad:    Haddii raadka tolmuhu uu waynaado ama uu ur keeno.    Haddii aad ku yeelato meesha la tolay barar, guduudasho, ama cun cun.    Haddii aad yeelato xanuun kordhaya oo xanuunkaagana aysan daawadu wax ka tarayn.     Haddii aad qabto qandho  100.4  F (38  C) am aka saraysa ([facility]), oo qarqaryo leh ama aan lahayn     Meesha u dhow meesha la qalay [facility] waxaad ka dareemi doontaa refugioyspayam darmert lahayn. John naty caacr. Dareen la aantu waxay  u dhamaan doontaa wax kayar sanad.     Gacmahaagga nadiifi:  Markasta dhaqa gacahaaga ka hor inta aadan taaban farjiga agagaarkiisa  iyo meesha la tolay. John waxay caawiniaysaa inuu yaraado infakshanku. Haddii gacmahaagu aysan wasakh lahayn, waxaad isticmaali kartaa aalkalo aad gacmaha ku tirtirto si aad u nadiifiso gacmahaaga. Cidiyahaaga nadiifi ooo jar.    [facility] bixiyahaaga daryeelka caafimaaad haddii aad qabtid mid ka  mid ah calaamadahan kendall socda:    Haddii suufka dhiigga nuuga uu ku buuxsamo 1 saac gudihiis, ama aad aragto xinjiro dhiig ah oo ka wayn kubadda golafka.    iig soconaya wax kabadan 6 asbuuc.    Haddii aad qabto dheecaan farjiga ka imaanaya oo  si xun u uraya.    Xanuun, nabar, majiirid daran oo aad ka dareeto qaybta hoose ee ubucda.    Kaadi badan oo dagdag ah oo markasta ku qabanaysa, ama gubasho aad dareento markaad kaajayso.    Lalabbo ama matag.    Guduudasho, barar, ama xanuun aad ka dareento xididada lugta.    Dhibaato kaa haysata naas nuujinta, ama guduudasho ama xanuun naaska ah.    Xabad xanuun iyo qufac ama naqaska oo kugu dhagaya.    Dhibaato ay la socoto murugo, walaac, aa walwal.   Haddii aad qabtid wax walwal ah oo ku saabsan inaad waxyeelayso naftaada ama ilmaha, [facility] dhakhtarka josefa markiiba.     Haddii aad qabto cisse aalo ama walwal markaad guriga ku noqoto kamariaelena.       Birth Discharge Instructions  Follow up with OB in 6 weeks for postpartum check.  (patients discussed with MD follow up in one week as well    Carlito Fragoso Lactation: [phone]  Activity    Do not lift more than 10 pounds for 6 weeks after surgery. Ask family and friends for help when you need it.    Do not drive while taking pain pills prescribed by your doctor. You may drive if taking over-the-counter pain pills.    No heavy exercise or activity for 6 weeks. Don t do anything that will put a strain on your surgery site.    Don t strain when using the toilet. Your care team may prescribe a stool  softener if you have problems with your bowel movements (constipation).    To care for your incision:    Keep the incision clean and dry    Do not soak your incision in water. No swimming or hot tubs until your incision has fully healed. You may soak in the bathtub if the water level is below your incision.    After washing, dry your incision well. Include the skin that may come in contact with your incision.    Do not use any peroxide, gel, cream, lotion, or ointment on your incision.     Adjust your clothes to avoid pressure on your surgery site (check the elastic in your underwear for example)    If you have Steri-Strips, leave the small strips of tape in place. They will fall off on their own, or you can remove them after one week.    You may see a small amount of clear or pink drainage and this is normal. Check with your health care provider:    If the drainage increases or has an odor.    If you have swelling, redness, or a rash around the incision.    If you have increased pain and your pain medicine doesn t help    If you have a fever of 100.4  F (38  C) or higher (temperature taken under your tongue), with or without chills   The area around your incision (surgery wound) will feel numb. This is normal. The numbness should go away in less than a year.   Keep your hands clean:   Always wash your hands before touching your incision. This helps reduce your risk of infection. If your hands aren t dirty, you may use an alcohol hand-rub to clean your hands. Keep your nails clean and short.     Call your health care provider if you have any of these symptoms:    You soak a sanitary pad with blood within 1 hour, or you see blood clots larger than a golf ball.    Bleeding that lasts more than 6 weeks.    You have vaginal discharge that smells bad.    Severe pain, cramping or tenderness in your lower belly area.    A more frequent or urgent need to urinate (pee), or it burns when you pee.    Nausea and  "vomiting.    Redness, swelling or pain around a vein in your leg.    Problems breastfeeding, or a red or painful area on your breast.    If you have chest pain and cough or are gasping for air.    Problems coping with sadness, anxiety, or depression.     If you have any concerns about hurting yourself of the baby, call your doctor right away.      You have questions or concerns after you return home.    Pending Results     No orders found from 2018 to 2018.            Statement of Approval     Ordered          18 0624  I have reviewed and agree with all the recommendations and orders detailed in this document.  EFFECTIVE NOW     Approved and electronically signed by:  Telma Herrera MD             Admission Information     Date & Time Provider Department Dept. Phone    2018 Marjan Bruner MD [facility] Postpartum [phone]      Your Vitals Were     Blood Pressure Pulse Temperature Respirations Weight Last Period    102/ 100 98.6  F (37  C) (Oral) 18 73 kg (161 lb) 11/15/2017    Pulse Oximetry BMI (Body Mass Index)                98% 25.22 kg/m2          MyChart Information     [employer] lets you send messages to your doctor, view your test results, renew your prescriptions, schedule appointments and more. To sign up, go to [email or web address] . Click on \"Log in\" on the left side of the screen, which will take you to the Welcome page. Then click on \"Sign up Now\" on the right side of the page.     You will be asked to enter the access code listed below, as well as some personal information. Please follow the directions to create your username and password.     Your access code is: [email or web address]  Expires: 2018 10:28 AM     Your access code will  in 90 days. If you need help or a new code, please call your [place] clinic or [phone].        Care EveryWhere ID     This is your Care EveryWhere ID. This could be used by other organizations to access your [place] " medical records  [id number]        Equal Access to Services     LOGAN SON : Hadii aad ku hadneetuchristopher Sobilly, waaxda luqadaha, qaybta kamorenaflynn merida, matthias plattkvngtrista drake. So [facility] [phone].    ATENCIÓN: Si habla español, tiene a cisse disposición servicios gratuitos de asistencia lingüística. [facility] [phone].    We comply with applicable federal civil rights laws and [place] laws. We do not discriminate on the basis of race, color, national origin, age, disability, sex, sexual orientation, or gender identity.               Review of your medicines      START taking        Dose / Directions    acetaminophen 325 MG tablet   Commonly known as:  TYLENOL        Dose:  975 mg   Take 3 tablets (975 mg) by mouth every 8 hours   Quantity:  100 tablet   Refills:  0       ibuprofen 800 MG tablet   Commonly known as:  ADVIL/MOTRIN        Dose:  800 mg   Take 1 tablet (800 mg) by mouth every 6 hours as needed for other (cramping)   Quantity:  90 tablet   Refills:  0       oxyCODONE IR 5 MG tablet   Commonly known as:  ROXICODONE        Dose:  5-10 mg   Take 1-2 tablets (5-10 mg) by mouth every 3 hours as needed for other (pain control or improvement in physical function. Hold dose for analgesic side effects.)   Quantity:  20 tablet   Refills:  0       senna-docusate 8.6-50 MG per tablet   Commonly known as:  SENOKOT-S;PERICOLACE        Dose:  2 tablet   Take 2 tablets by mouth 2 times daily as needed for constipation   Quantity:  100 tablet   Refills:  0         CONTINUE these medicines which have NOT CHANGED        Dose / Directions    PRENATAL VITAMIN PO        Dose:  1 tablet   Take 1 tablet by mouth At Bedtime   Refills:  0            Where to get your medicines      These medications were sent to [facility] [id number] [facility] [address]     Phone:  [phone]     acetaminophen 325 MG tablet    ibuprofen 800 MG  tablet    senna-docusate 8.6-50 MG per tablet         Some of these will need a paper prescription and others can be bought over the counter. Ask your nurse if you have questions.     Bring a paper prescription for each of these medications     oxyCODONE IR 5 MG tablet                Protect others around you: Learn how to safely use, store and throw away your medicines at www.disposemymeds.org.        Information about OPIOIDS     PRESCRIPTION OPIOIDS: WHAT YOU NEED TO KNOW   We gave you an opioid (narcotic) pain medicine. It is important to manage your pain, but opioids are not always the best choice. You should first try all the other options your care team gave you. Take this medicine for as short a time (and as few doses) as possible.    Some activities can increase your pain, such as bandage changes or therapy sessions. It may help to take your pain medicine 30 to 60 minutes before these activities. Reduce your stress by getting enough sleep, working on hobbies you enjoy and practicing relaxation or meditation. Talk to your care team about ways to manage your pain beyond prescription opioids.    These medicines have risks:    DO NOT drive when on new or higher doses of pain medicine. These medicines can affect your alertness and reaction times, and you could be arrested for driving under the influence (DUI). If you need to use opioids long-term, talk to your care team about driving.    DO NOT operate heavy machinery    DO NOT do any other dangerous activities while taking these medicines.    DO NOT drink any alcohol while taking these medicines.     If the opioid prescribed includes acetaminophen, DO NOT take with any other medicines that contain acetaminophen. Read all labels carefully. Look for the word  acetaminophen  or  Tylenol.  Ask your pharmacist if you have questions or are unsure.    You can get addicted to pain medicines, especially if you have a history of addiction (chemical, alcohol or substance  dependence). Talk to your care team about ways to reduce this risk.    All opioids tend to cause constipation. Drink plenty of water and eat foods that have a lot of fiber, such as fruits, vegetables, prune juice, apple juice and high-fiber cereal. Take a laxative (Miralax, milk of magnesia, Colace, Senna) if you don t move your bowels at least every other day. Other side effects include upset stomach, sleepiness, dizziness, throwing up, tolerance (needing more of the medicine to have the same effect), physical dependence and slowed breathing.    Store your pills in a secure place, locked if possible. We will not replace any lost or stolen medicine. If you don t finish your medicine, please throw away (dispose) as directed by your pharmacist. The [place] [employer]ution [employer] has more information about safe disposal: https://www.pca.[facility].mn.us/living-green/managing-unwanted-medications             Medication List: This is a list of all your medications and when to take them. Check marks below indicate your daily home schedule. Keep this list as a reference.      Medications           Morning Afternoon Evening Bedtime As Needed    acetaminophen 325 MG tablet   Commonly known as:  TYLENOL   Take 3 tablets (975 mg) by mouth every 8 hours   Last time this was given:  975 mg on 8/8/2018  9:37 AM                                   ibuprofen 800 MG tablet   Commonly known as:  ADVIL/MOTRIN   Take 1 tablet (800 mg) by mouth every 6 hours as needed for other (cramping)   Last time this was given:  800 mg on 8/8/2018  8:07 AM                                   oxyCODONE IR 5 MG tablet   Commonly known as:  ROXICODONE   Take 1-2 tablets (5-10 mg) by mouth every 3 hours as needed for other (pain control or improvement in physical function. Hold dose for analgesic side effects.)   Last time this was given:  5 mg on 8/8/2018  6:26 AM                                   PRENATAL VITAMIN PO   Take 1 tablet by mouth At Bedtime                                    senna-docusate 8.6-50 MG per tablet   Commonly known as:  SENOKOT-S;PERICOLACE   Take 2 tablets by mouth 2 times daily as needed for constipation   Last time this was given:  1 tablet on 8/8/2018  9:45 AM

## 2018-08-05 ENCOUNTER — ANESTHESIA EVENT (OUTPATIENT)
Dept: OBGYN | Facility: CLINIC | Age: 33
End: 2018-08-05
Payer: COMMERCIAL

## 2018-08-05 ENCOUNTER — ANESTHESIA (OUTPATIENT)
Dept: OBGYN | Facility: CLINIC | Age: 33
End: 2018-08-05
Payer: COMMERCIAL

## 2018-08-05 LAB
GLUCOSE BLDC GLUCOMTR-MCNC: 106 MG/DL (ref 70–99)
GLUCOSE BLDC GLUCOMTR-MCNC: 114 MG/DL (ref 70–99)
GLUCOSE BLDC GLUCOMTR-MCNC: 71 MG/DL (ref 70–99)
GLUCOSE BLDC GLUCOMTR-MCNC: 88 MG/DL (ref 70–99)
GLUCOSE BLDC GLUCOMTR-MCNC: 89 MG/DL (ref 70–99)
GLUCOSE BLDC GLUCOMTR-MCNC: 96 MG/DL (ref 70–99)
HGB BLD-MCNC: 11.2 G/DL (ref 11.7–15.7)
HGB BLD-MCNC: 11.5 G/DL (ref 11.7–15.7)
KETONES BLD-SCNC: 0.3 MMOL/L (ref 0–0.6)
T PALLIDUM AB SER QL: NONREACTIVE

## 2018-08-05 PROCEDURE — 36000056 ZZH SURGERY LEVEL 3 1ST 30 MIN: Performed by: OBSTETRICS & GYNECOLOGY

## 2018-08-05 PROCEDURE — 25000128 H RX IP 250 OP 636: Performed by: NURSE ANESTHETIST, CERTIFIED REGISTERED

## 2018-08-05 PROCEDURE — 37000008 ZZH ANESTHESIA TECHNICAL FEE, 1ST 30 MIN: Performed by: OBSTETRICS & GYNECOLOGY

## 2018-08-05 PROCEDURE — 82010 KETONE BODYS QUAN: CPT | Performed by: OBSTETRICS & GYNECOLOGY

## 2018-08-05 PROCEDURE — 27110038 ZZH RX 271: Performed by: ANESTHESIOLOGY

## 2018-08-05 PROCEDURE — 25000125 ZZHC RX 250: Performed by: NURSE ANESTHETIST, CERTIFIED REGISTERED

## 2018-08-05 PROCEDURE — 25000128 H RX IP 250 OP 636: Performed by: ANESTHESIOLOGY

## 2018-08-05 PROCEDURE — 36415 COLL VENOUS BLD VENIPUNCTURE: CPT | Performed by: OBSTETRICS & GYNECOLOGY

## 2018-08-05 PROCEDURE — 27210995 ZZH RX 272: Performed by: OBSTETRICS & GYNECOLOGY

## 2018-08-05 PROCEDURE — 00000146 ZZHCL STATISTIC GLUCOSE BY METER IP

## 2018-08-05 PROCEDURE — 25000128 H RX IP 250 OP 636: Performed by: OBSTETRICS & GYNECOLOGY

## 2018-08-05 PROCEDURE — 25000132 ZZH RX MED GY IP 250 OP 250 PS 637: Performed by: OBSTETRICS & GYNECOLOGY

## 2018-08-05 PROCEDURE — 27210794 ZZH OR GENERAL SUPPLY STERILE: Performed by: OBSTETRICS & GYNECOLOGY

## 2018-08-05 PROCEDURE — 88307 TISSUE EXAM BY PATHOLOGIST: CPT | Performed by: OBSTETRICS & GYNECOLOGY

## 2018-08-05 PROCEDURE — 71000012 ZZH RECOVERY PHASE 1 LEVEL 1 FIRST HR: Performed by: OBSTETRICS & GYNECOLOGY

## 2018-08-05 PROCEDURE — 71000013 ZZH RECOVERY PHASE 1 LEVEL 1 EA ADDTL HR: Performed by: OBSTETRICS & GYNECOLOGY

## 2018-08-05 PROCEDURE — 36000058 ZZH SURGERY LEVEL 3 EA 15 ADDTL MIN: Performed by: OBSTETRICS & GYNECOLOGY

## 2018-08-05 PROCEDURE — 88307 TISSUE EXAM BY PATHOLOGIST: CPT | Mod: 26 | Performed by: OBSTETRICS & GYNECOLOGY

## 2018-08-05 PROCEDURE — 25000125 ZZHC RX 250: Performed by: ANESTHESIOLOGY

## 2018-08-05 PROCEDURE — 86780 TREPONEMA PALLIDUM: CPT | Performed by: OBSTETRICS & GYNECOLOGY

## 2018-08-05 PROCEDURE — 25000132 ZZH RX MED GY IP 250 OP 250 PS 637

## 2018-08-05 PROCEDURE — 12000029 ZZH R&B OB INTERMEDIATE

## 2018-08-05 PROCEDURE — 10907ZC DRAINAGE OF AMNIOTIC FLUID, THERAPEUTIC FROM PRODUCTS OF CONCEPTION, VIA NATURAL OR ARTIFICIAL OPENING: ICD-10-PCS | Performed by: OBSTETRICS & GYNECOLOGY

## 2018-08-05 PROCEDURE — 00HU33Z INSERTION OF INFUSION DEVICE INTO SPINAL CANAL, PERCUTANEOUS APPROACH: ICD-10-PCS | Performed by: ANESTHESIOLOGY

## 2018-08-05 PROCEDURE — 3E0R3BZ INTRODUCTION OF ANESTHETIC AGENT INTO SPINAL CANAL, PERCUTANEOUS APPROACH: ICD-10-PCS | Performed by: ANESTHESIOLOGY

## 2018-08-05 PROCEDURE — 37000009 ZZH ANESTHESIA TECHNICAL FEE, EACH ADDTL 15 MIN: Performed by: OBSTETRICS & GYNECOLOGY

## 2018-08-05 PROCEDURE — 85018 HEMOGLOBIN: CPT | Performed by: OBSTETRICS & GYNECOLOGY

## 2018-08-05 RX ORDER — PROCHLORPERAZINE 25 MG
25 SUPPOSITORY, RECTAL RECTAL EVERY 12 HOURS PRN
Status: DISCONTINUED | OUTPATIENT
Start: 2018-08-05 | End: 2018-08-08 | Stop reason: HOSPADM

## 2018-08-05 RX ORDER — NALOXONE HYDROCHLORIDE 0.4 MG/ML
.1-.4 INJECTION, SOLUTION INTRAMUSCULAR; INTRAVENOUS; SUBCUTANEOUS
Status: DISCONTINUED | OUTPATIENT
Start: 2018-08-05 | End: 2018-08-08 | Stop reason: CLARIF

## 2018-08-05 RX ORDER — CITRIC ACID/SODIUM CITRATE 334-500MG
SOLUTION, ORAL ORAL
Status: COMPLETED
Start: 2018-08-05 | End: 2018-08-05

## 2018-08-05 RX ORDER — LIDOCAINE HCL/EPINEPHRINE/PF 2%-1:200K
VIAL (ML) INJECTION PRN
Status: DISCONTINUED | OUTPATIENT
Start: 2018-08-05 | End: 2018-08-05

## 2018-08-05 RX ORDER — MORPHINE SULFATE 1 MG/ML
INJECTION, SOLUTION EPIDURAL; INTRATHECAL; INTRAVENOUS PRN
Status: DISCONTINUED | OUTPATIENT
Start: 2018-08-05 | End: 2018-08-05

## 2018-08-05 RX ORDER — IBUPROFEN 600 MG/1
600 TABLET, FILM COATED ORAL EVERY 6 HOURS PRN
Status: DISCONTINUED | OUTPATIENT
Start: 2018-08-05 | End: 2018-08-08 | Stop reason: HOSPADM

## 2018-08-05 RX ORDER — DIPHENHYDRAMINE HYDROCHLORIDE 50 MG/ML
25 INJECTION INTRAMUSCULAR; INTRAVENOUS EVERY 6 HOURS PRN
Status: DISCONTINUED | OUTPATIENT
Start: 2018-08-05 | End: 2018-08-08 | Stop reason: HOSPADM

## 2018-08-05 RX ORDER — ACETAMINOPHEN 325 MG/1
975 TABLET ORAL EVERY 8 HOURS
Status: COMPLETED | OUTPATIENT
Start: 2018-08-05 | End: 2018-08-08

## 2018-08-05 RX ORDER — OXYTOCIN/0.9 % SODIUM CHLORIDE 30/500 ML
100 PLASTIC BAG, INJECTION (ML) INTRAVENOUS CONTINUOUS
Status: DISCONTINUED | OUTPATIENT
Start: 2018-08-05 | End: 2018-08-08 | Stop reason: HOSPADM

## 2018-08-05 RX ORDER — ONDANSETRON 2 MG/ML
INJECTION INTRAMUSCULAR; INTRAVENOUS PRN
Status: DISCONTINUED | OUTPATIENT
Start: 2018-08-05 | End: 2018-08-05

## 2018-08-05 RX ORDER — IBUPROFEN 400 MG/1
400 TABLET, FILM COATED ORAL EVERY 6 HOURS PRN
Status: DISCONTINUED | OUTPATIENT
Start: 2018-08-05 | End: 2018-08-08 | Stop reason: HOSPADM

## 2018-08-05 RX ORDER — EPHEDRINE SULFATE 50 MG/ML
INJECTION, SOLUTION INTRAVENOUS PRN
Status: DISCONTINUED | OUTPATIENT
Start: 2018-08-05 | End: 2018-08-05

## 2018-08-05 RX ORDER — BISACODYL 10 MG
10 SUPPOSITORY, RECTAL RECTAL DAILY PRN
Status: DISCONTINUED | OUTPATIENT
Start: 2018-08-07 | End: 2018-08-08 | Stop reason: HOSPADM

## 2018-08-05 RX ORDER — CITRIC ACID/SODIUM CITRATE 334-500MG
30 SOLUTION, ORAL ORAL
Status: COMPLETED | OUTPATIENT
Start: 2018-08-05 | End: 2018-08-05

## 2018-08-05 RX ORDER — LIDOCAINE 40 MG/G
CREAM TOPICAL
Status: DISCONTINUED | OUTPATIENT
Start: 2018-08-05 | End: 2018-08-08 | Stop reason: HOSPADM

## 2018-08-05 RX ORDER — AMOXICILLIN 250 MG
2 CAPSULE ORAL 2 TIMES DAILY PRN
Status: DISCONTINUED | OUTPATIENT
Start: 2018-08-05 | End: 2018-08-08 | Stop reason: HOSPADM

## 2018-08-05 RX ORDER — CEFAZOLIN SODIUM 2 G/100ML
2 INJECTION, SOLUTION INTRAVENOUS
Status: COMPLETED | OUTPATIENT
Start: 2018-08-05 | End: 2018-08-05

## 2018-08-05 RX ORDER — ACETAMINOPHEN 325 MG/1
650 TABLET ORAL EVERY 4 HOURS PRN
Status: DISCONTINUED | OUTPATIENT
Start: 2018-08-08 | End: 2018-08-08 | Stop reason: HOSPADM

## 2018-08-05 RX ORDER — CARBOPROST TROMETHAMINE 250 UG/ML
250 INJECTION, SOLUTION INTRAMUSCULAR
Status: DISCONTINUED | OUTPATIENT
Start: 2018-08-05 | End: 2018-08-08 | Stop reason: HOSPADM

## 2018-08-05 RX ORDER — ONDANSETRON 2 MG/ML
4 INJECTION INTRAMUSCULAR; INTRAVENOUS EVERY 6 HOURS PRN
Status: DISCONTINUED | OUTPATIENT
Start: 2018-08-05 | End: 2018-08-08 | Stop reason: HOSPADM

## 2018-08-05 RX ORDER — MISOPROSTOL 200 UG/1
800 TABLET ORAL
Status: DISCONTINUED | OUTPATIENT
Start: 2018-08-05 | End: 2018-08-08 | Stop reason: HOSPADM

## 2018-08-05 RX ORDER — CITRIC ACID/SODIUM CITRATE 334-500MG
SOLUTION, ORAL ORAL
Status: DISCONTINUED
Start: 2018-08-05 | End: 2018-08-05 | Stop reason: WASHOUT

## 2018-08-05 RX ORDER — LANOLIN 100 %
OINTMENT (GRAM) TOPICAL
Status: DISCONTINUED | OUTPATIENT
Start: 2018-08-05 | End: 2018-08-08 | Stop reason: HOSPADM

## 2018-08-05 RX ORDER — IBUPROFEN 800 MG/1
800 TABLET, FILM COATED ORAL EVERY 6 HOURS PRN
Status: DISCONTINUED | OUTPATIENT
Start: 2018-08-05 | End: 2018-08-08 | Stop reason: HOSPADM

## 2018-08-05 RX ORDER — NICOTINE POLACRILEX 4 MG
15-30 LOZENGE BUCCAL
Status: DISCONTINUED | OUTPATIENT
Start: 2018-08-05 | End: 2018-08-08 | Stop reason: HOSPADM

## 2018-08-05 RX ORDER — CEFAZOLIN SODIUM 1 G/3ML
1 INJECTION, POWDER, FOR SOLUTION INTRAMUSCULAR; INTRAVENOUS SEE ADMIN INSTRUCTIONS
Status: DISCONTINUED | OUTPATIENT
Start: 2018-08-05 | End: 2018-08-05 | Stop reason: HOSPADM

## 2018-08-05 RX ORDER — OXYTOCIN 10 [USP'U]/ML
10 INJECTION, SOLUTION INTRAMUSCULAR; INTRAVENOUS
Status: DISCONTINUED | OUTPATIENT
Start: 2018-08-05 | End: 2018-08-08 | Stop reason: HOSPADM

## 2018-08-05 RX ORDER — OXYTOCIN/0.9 % SODIUM CHLORIDE 30/500 ML
PLASTIC BAG, INJECTION (ML) INTRAVENOUS PRN
Status: DISCONTINUED | OUTPATIENT
Start: 2018-08-05 | End: 2018-08-05

## 2018-08-05 RX ORDER — OXYTOCIN/0.9 % SODIUM CHLORIDE 30/500 ML
340 PLASTIC BAG, INJECTION (ML) INTRAVENOUS CONTINUOUS PRN
Status: DISCONTINUED | OUTPATIENT
Start: 2018-08-05 | End: 2018-08-08 | Stop reason: HOSPADM

## 2018-08-05 RX ORDER — METHYLERGONOVINE MALEATE 0.2 MG/ML
200 INJECTION INTRAVENOUS
Status: DISCONTINUED | OUTPATIENT
Start: 2018-08-05 | End: 2018-08-08 | Stop reason: HOSPADM

## 2018-08-05 RX ORDER — MAGNESIUM HYDROXIDE 1200 MG/15ML
LIQUID ORAL PRN
Status: DISCONTINUED | OUTPATIENT
Start: 2018-08-05 | End: 2018-08-08 | Stop reason: HOSPADM

## 2018-08-05 RX ORDER — OXYCODONE HYDROCHLORIDE 5 MG/1
5-10 TABLET ORAL
Status: DISCONTINUED | OUTPATIENT
Start: 2018-08-05 | End: 2018-08-08 | Stop reason: HOSPADM

## 2018-08-05 RX ORDER — FENTANYL CITRATE 50 UG/ML
INJECTION, SOLUTION INTRAMUSCULAR; INTRAVENOUS PRN
Status: DISCONTINUED | OUTPATIENT
Start: 2018-08-05 | End: 2018-08-05

## 2018-08-05 RX ORDER — DEXTROSE, SODIUM CHLORIDE, SODIUM LACTATE, POTASSIUM CHLORIDE, AND CALCIUM CHLORIDE 5; .6; .31; .03; .02 G/100ML; G/100ML; G/100ML; G/100ML; G/100ML
INJECTION, SOLUTION INTRAVENOUS CONTINUOUS
Status: DISCONTINUED | OUTPATIENT
Start: 2018-08-05 | End: 2018-08-08 | Stop reason: HOSPADM

## 2018-08-05 RX ORDER — NALOXONE HYDROCHLORIDE 0.4 MG/ML
.1-.4 INJECTION, SOLUTION INTRAMUSCULAR; INTRAVENOUS; SUBCUTANEOUS
Status: DISCONTINUED | OUTPATIENT
Start: 2018-08-05 | End: 2018-08-08 | Stop reason: HOSPADM

## 2018-08-05 RX ORDER — DIPHENHYDRAMINE HCL 25 MG
25 CAPSULE ORAL EVERY 6 HOURS PRN
Status: DISCONTINUED | OUTPATIENT
Start: 2018-08-05 | End: 2018-08-08 | Stop reason: HOSPADM

## 2018-08-05 RX ORDER — SODIUM CHLORIDE, SODIUM LACTATE, POTASSIUM CHLORIDE, CALCIUM CHLORIDE 600; 310; 30; 20 MG/100ML; MG/100ML; MG/100ML; MG/100ML
INJECTION, SOLUTION INTRAVENOUS CONTINUOUS
Status: DISCONTINUED | OUTPATIENT
Start: 2018-08-05 | End: 2018-08-05 | Stop reason: HOSPADM

## 2018-08-05 RX ORDER — HYDROMORPHONE HYDROCHLORIDE 1 MG/ML
.3-.5 INJECTION, SOLUTION INTRAMUSCULAR; INTRAVENOUS; SUBCUTANEOUS EVERY 30 MIN PRN
Status: DISCONTINUED | OUTPATIENT
Start: 2018-08-05 | End: 2018-08-06

## 2018-08-05 RX ORDER — AMOXICILLIN 250 MG
1 CAPSULE ORAL 2 TIMES DAILY PRN
Status: DISCONTINUED | OUTPATIENT
Start: 2018-08-05 | End: 2018-08-08 | Stop reason: HOSPADM

## 2018-08-05 RX ORDER — HYDROCORTISONE 2.5 %
CREAM (GRAM) TOPICAL 3 TIMES DAILY PRN
Status: DISCONTINUED | OUTPATIENT
Start: 2018-08-05 | End: 2018-08-08 | Stop reason: HOSPADM

## 2018-08-05 RX ORDER — SIMETHICONE 80 MG
80 TABLET,CHEWABLE ORAL 4 TIMES DAILY PRN
Status: DISCONTINUED | OUTPATIENT
Start: 2018-08-05 | End: 2018-08-08 | Stop reason: HOSPADM

## 2018-08-05 RX ORDER — DEXTROSE MONOHYDRATE 25 G/50ML
25-50 INJECTION, SOLUTION INTRAVENOUS
Status: DISCONTINUED | OUTPATIENT
Start: 2018-08-05 | End: 2018-08-08 | Stop reason: HOSPADM

## 2018-08-05 RX ADMIN — PHENYLEPHRINE HYDROCHLORIDE 200 MCG: 10 INJECTION, SOLUTION INTRAMUSCULAR; INTRAVENOUS; SUBCUTANEOUS at 12:39

## 2018-08-05 RX ADMIN — Medication: at 14:58

## 2018-08-05 RX ADMIN — PHENYLEPHRINE HYDROCHLORIDE 100 MCG: 10 INJECTION, SOLUTION INTRAMUSCULAR; INTRAVENOUS; SUBCUTANEOUS at 13:01

## 2018-08-05 RX ADMIN — Medication 30 ML: at 11:59

## 2018-08-05 RX ADMIN — PHENYLEPHRINE HYDROCHLORIDE 200 MCG: 10 INJECTION, SOLUTION INTRAMUSCULAR; INTRAVENOUS; SUBCUTANEOUS at 12:44

## 2018-08-05 RX ADMIN — ONDANSETRON 4 MG: 2 INJECTION INTRAMUSCULAR; INTRAVENOUS at 12:32

## 2018-08-05 RX ADMIN — OXYTOCIN-SODIUM CHLORIDE 0.9% IV SOLN 30 UNIT/500ML 500 ML: 30-0.9/5 SOLUTION at 12:27

## 2018-08-05 RX ADMIN — EPHEDRINE SULFATE 25 MG: 50 INJECTION, SOLUTION INTRAVENOUS at 13:11

## 2018-08-05 RX ADMIN — PHENYLEPHRINE HYDROCHLORIDE 100 MCG: 10 INJECTION, SOLUTION INTRAMUSCULAR; INTRAVENOUS; SUBCUTANEOUS at 12:59

## 2018-08-05 RX ADMIN — EPHEDRINE SULFATE 5 MG: 50 INJECTION, SOLUTION INTRAVENOUS at 08:40

## 2018-08-05 RX ADMIN — PHENYLEPHRINE HYDROCHLORIDE 200 MCG: 10 INJECTION, SOLUTION INTRAMUSCULAR; INTRAVENOUS; SUBCUTANEOUS at 12:52

## 2018-08-05 RX ADMIN — SODIUM CHLORIDE, POTASSIUM CHLORIDE, SODIUM LACTATE AND CALCIUM CHLORIDE: 600; 310; 30; 20 INJECTION, SOLUTION INTRAVENOUS at 12:37

## 2018-08-05 RX ADMIN — SODIUM CHLORIDE, POTASSIUM CHLORIDE, SODIUM LACTATE AND CALCIUM CHLORIDE: 600; 310; 30; 20 INJECTION, SOLUTION INTRAVENOUS at 11:40

## 2018-08-05 RX ADMIN — AZITHROMYCIN MONOHYDRATE 500 MG: 500 INJECTION, POWDER, LYOPHILIZED, FOR SOLUTION INTRAVENOUS at 12:23

## 2018-08-05 RX ADMIN — LIDOCAINE HYDROCHLORIDE,EPINEPHRINE BITARTRATE 17 ML: 20; .005 INJECTION, SOLUTION EPIDURAL; INFILTRATION; INTRACAUDAL; PERINEURAL at 12:08

## 2018-08-05 RX ADMIN — EPHEDRINE SULFATE 5 MG: 50 INJECTION, SOLUTION INTRAVENOUS at 13:01

## 2018-08-05 RX ADMIN — FENTANYL CITRATE 50 MCG: 50 INJECTION, SOLUTION INTRAMUSCULAR; INTRAVENOUS at 13:11

## 2018-08-05 RX ADMIN — CEFAZOLIN SODIUM 2 G: 2 INJECTION, SOLUTION INTRAVENOUS at 12:07

## 2018-08-05 RX ADMIN — OXYTOCIN 1 MILLI-UNITS/MIN: 10 INJECTION INTRAVENOUS at 03:17

## 2018-08-05 RX ADMIN — Medication: at 08:00

## 2018-08-05 RX ADMIN — SODIUM CHLORIDE, POTASSIUM CHLORIDE, SODIUM LACTATE AND CALCIUM CHLORIDE: 600; 310; 30; 20 INJECTION, SOLUTION INTRAVENOUS at 08:41

## 2018-08-05 RX ADMIN — SODIUM CHLORIDE, SODIUM LACTATE, POTASSIUM CHLORIDE, CALCIUM CHLORIDE AND DEXTROSE MONOHYDRATE: 5; 600; 310; 30; 20 INJECTION, SOLUTION INTRAVENOUS at 01:01

## 2018-08-05 RX ADMIN — SODIUM CITRATE AND CITRIC ACID MONOHYDRATE 30 ML: 500; 334 SOLUTION ORAL at 11:59

## 2018-08-05 RX ADMIN — FENTANYL CITRATE 100 MCG: 50 INJECTION, SOLUTION INTRAMUSCULAR; INTRAVENOUS at 12:08

## 2018-08-05 RX ADMIN — PHENYLEPHRINE HYDROCHLORIDE 100 MCG: 10 INJECTION, SOLUTION INTRAMUSCULAR; INTRAVENOUS; SUBCUTANEOUS at 12:30

## 2018-08-05 RX ADMIN — SODIUM CHLORIDE, SODIUM LACTATE, POTASSIUM CHLORIDE, CALCIUM CHLORIDE AND DEXTROSE MONOHYDRATE: 5; 600; 310; 30; 20 INJECTION, SOLUTION INTRAVENOUS at 18:43

## 2018-08-05 RX ADMIN — MORPHINE SULFATE 4 MG: 1 INJECTION EPIDURAL; INTRATHECAL; INTRAVENOUS at 12:31

## 2018-08-05 RX ADMIN — OXYTOCIN 100 ML/HR: 10 INJECTION INTRAVENOUS at 13:58

## 2018-08-05 RX ADMIN — ACETAMINOPHEN 975 MG: 325 TABLET, FILM COATED ORAL at 17:23

## 2018-08-05 NOTE — ANESTHESIA PREPROCEDURE EVALUATION
PAC NOTE:       ANESTHESIA PRE EVALUATION:  Anesthesia Evaluation       history and physical reviewed .             ROS/MED HX    ENT/Pulmonary:  - neg pulmonary ROS     Neurologic:  - neg neurologic ROS     Cardiovascular:  - neg cardiovascular ROS       METS/Exercise Tolerance:     Hematologic:         Musculoskeletal:         GI/Hepatic:  - neg GI/hepatic ROS       Renal/Genitourinary:         Endo:         Psychiatric:         Infectious Disease:         Malignancy:         Other:                     Physical Exam  Normal systems: cardiovascular, pulmonary and dental    Airway   Mallampati: II    Dental     Cardiovascular       Pulmonary     Other findings: .Lab Test        08/04/18     07/02/18     06/16/18     06/15/18     02/26/18                       2031          0953          0913          1410          0403          WBC           --          8.8           --          5.8          8.4           HGB          13.9         12.1         12.0         11.8         13.5          MCV           --          91            --          92           88            PLT           --          131*          --          148*         212            Lab Test        07/02/18     06/15/18     02/26/18                       0953          1410          0403          NA           138          139          135           POTASSIUM    3.9          3.5          3.6           CHLORIDE     106          108          104           CO2          22           23           23            BUN          4*           7            6*            CR           0.38*        0.46*        0.44*         ANIONGAP     10           8            8             CLEMENTINE          8.7          8.4*         8.7           GLC          75           82           92              neg OB ROS            Anesthesia Plan  Procedure only, no anesthetic delivered    History & Physical Review      ASA Status:  2 .  OB Epidural Asa: 2       Plan for Epidural          Postoperative  Care      Consents  Anesthetic plan, risks, benefits and alternatives discussed with:  Patient..                            .

## 2018-08-05 NOTE — PROVIDER NOTIFICATION
08/04/18 2230   Provider Notification   Provider Name/Title Dr Biswas   Method of Notification At Bedside   Request Evaluate in Person   Notification Reason Status Update     Dr Biswas at bedside as patient is requesting to be rechecked.  Very little change noted with cervical exam.  MD discussing oxytocin for augmentation and patient agreeable.

## 2018-08-05 NOTE — PROVIDER NOTIFICATION
08/04/18 1934   Provider Notification   Provider Name/Title dr biswas   Method of Notification Phone   Request Evaluate - Remote   Notification Reason SVE;Patient Arrived;Labor Status     Dr Biswas notified of patient's arrival, SVE with bulging bag of stevens, history of c/section with 5th baby for transverse position but vaginal deliveries with previous 4 deliveries.  With this pregnancy has had gestational diabetes diet controlled with a blood sugar of 97 upon arrival.  Category 1 tracing at this time.  Unable to find TOLAC consent in prenatal.  Dr Biswas will try to find from home otherwise will come to consent for TOLAC.    Will plan to admit to hospital and observe labor.

## 2018-08-05 NOTE — PLAN OF CARE
md at bedside to perform sve.  Infant head ballotable and bedside ultrasound done to verify vertex position.

## 2018-08-05 NOTE — PROVIDER NOTIFICATION
08/04/18 4103   Provider Notification   Provider Name/Title Dr Ronn Hoang   Method of Notification Phone   Request Evaluate - Remote   Notification Reason Status Update     Dr Franks notified that after oxytocin orders were obtained patient started having contractions every 2-3 minutes and is visibly more uncomfortable.  Will continue to observe labor progress, recheck cervix by 0100.  Continued category 1 tracing.

## 2018-08-05 NOTE — PROVIDER NOTIFICATION
08/04/18 0476   Provider Notification   Provider Name/Title Dr Franks   Method of Notification Phone   Request Evaluate - Remote   Notification Reason Lab/Diagnostic Study     Dr Franks notified of bedside glucose of 67.  Patient is asymptomatic and states she has been this low without difficulty.  Will start D5LR as per active diabetic orders with next IV bag.

## 2018-08-05 NOTE — PROVIDER NOTIFICATION
08/05/18 0327   Provider Notification   Provider Name/Title Dr Alaniz   Method of Notification Electronic Page  (text page)   Request Evaluate - Remote   Notification Reason SVE     Text page sent to dr alaniz that cervix is unchanged but station is now -2 and fetal head feels closer to the cervix.  Oxytocin has been started.

## 2018-08-05 NOTE — PROVIDER NOTIFICATION
08/05/18 0544   Provider Notification   Provider Name/Title Dr Franks    Method of Notification Electronic Page  (text page)   Request Evaluate - Remote   Notification Reason SVE;Lab/Diagnostic Study     Dr Franks updated re SVE of 7cm with fetal head now ballotable.  Category 1 tracing and contractions every 2-5 minutes.  Oxytocin infusion currently at 3mu/min.
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
## 2018-08-05 NOTE — PROVIDER NOTIFICATION
08/05/18 0633   Provider Notification   Provider Name/Title dr adair   Method of Notification At Bedside   Request Evaluate in Person   Notification Reason SVE     Dr Franks at bedside to perform sve.  Patient consenting to epidural at this time.

## 2018-08-05 NOTE — PROGRESS NOTES
2018      S: pt having uncomfortable UCs.  She did walk in the [place] for the past hour.      O: /78  Pulse 100  Temp 99  F (37.2  C) (Oral)  Resp 18  Wt 73 kg (161 lb)  LMP 11/15/2017  BMI 25.22 kg/m2  Gen: NAD, A&O x 3  Abd: gravid, NT  SVE: 5/80/-3, ballotable  FHT: cat 1 reactive  TOCO: Q2-6 min per RN      A/P: 34yo  @ 38.3 wga here for UCs, early labor.  AVSS.  - plan to start pitocin, slow   - pt ok for epidural when she desires        KARI ROSADO MD

## 2018-08-05 NOTE — ANESTHESIA CARE TRANSFER NOTE
Patient: Lorna Kenyon    Procedure(s):   - Wound Class: II-Clean Contaminated    Diagnosis:  section  Diagnosis Additional Information: No value filed.    Anesthesia Type:   No value filed.     Note:  Airway :Room Air  Patient transferred to:Labor and Delivery  Comments: Joe Report: Identifed the Patient, Identified the Reponsible Provider, Reviewed the pertinent medical history, Discussed the surgical course, Reviewed Intra-OP anesthesia mangement and issues during anesthesia, Set expectations for post-procedure period and Allowed opportunity for questions and acknowledgement of understanding      Vitals: (Last set prior to Anesthesia Care Transfer)    CRNA VITALS  2018 1238 - 2018 1318      2018             Pulse: 108    SpO2: 97 %                Electronically Signed By: JAN Ramirez CRNA  2018  1:18 PM

## 2018-08-05 NOTE — PROVIDER NOTIFICATION
08/04/18 2033   Provider Notification   Provider Name/Title dr biswas   Method of Notification At Bedside   Request Evaluate in Person   Notification Reason Status Update     Dr Biswas at bedside to discuss risks for TOLAC

## 2018-08-05 NOTE — PROVIDER NOTIFICATION
08/05/18 1713   Provider Notification   Provider Name/Title Dr Bruner    Method of Notification Phone   Request Evaluate - Remote   Notification Reason Status Update;Lab/Diagnostic Study;Other (Comment)   Dr Haakenson called and was updated with the patients pain and the labs that she ordered at 1730.

## 2018-08-05 NOTE — PLAN OF CARE
Data: Lorna Kenyon transferred to 432 via car at 1600. Baby transferred via crib.  Action: Receiving unit notified of transfer: Yes. Patient and family notified of room change. Report given to Tabitha TREVINO RN  at 1610. Belongings sent to receiving unit. Accompanied by Registered Nurse. Oriented patient to surroundings. Call light within reach. ID bands double-checked with receiving RN.  Response: Patient tolerated transfer and is stable. Patient is on a PCA and Capnrography.

## 2018-08-05 NOTE — ANESTHESIA PROCEDURE NOTES
Peripheral nerve/Neuraxial procedure note : epidural catheter  Pre-Procedure  Performed by BERNA TAYLOR  Referred by FELIX  Location: OB      Pre-Anesthestic Checklist: patient identified, IV checked, site marked, risks and benefits discussed, informed consent, monitors and equipment checked, pre-op evaluation, at physician/surgeon's request and post-op pain management    Timeout  Correct Patient: Yes   Correct Procedure: Yes   Correct Site: Yes   Correct Laterality: Yes   Correct Position: Yes   Site Marked: Yes   .   Procedure Documentation    .    Procedure:    Epidural catheter.     .  .       Assessment/Narrative  .  .  . Comments:  Patient desires Labor Epidural for labor analgesia. Vaginal delivery anticipated.    Chart reviewed. Patient examined. No changes to pre procedure chart review. Risks including but not limited to bleeding, infection, nerve injury, PDPH, intrathecal injection, high block, incomplete block, one-sided block, back pain, and low blood pressure discussed in detail. Questions answered. Consent signed.    Pause for the Cause completed. NIBP and pulse ox functioning. L&D nurse present.    Procedure: Sitting. Betadine prep x 3. Sterile drape applied.  Lidocaine 1% x 2 cc local infiltration at L 3-4.  17 G. Tu needle ML MAULIK 1 attempt.  No CSF, paresthesia or blood. 20 g. Epidural catheter inserted w/o resistance 5 cm.  Negative aspiration for CSF and blood. Filter in line.  Test dose Lidocaine 1.5% w/ 1:200,000 epi x 3 cc injected. Negative for neuro change or symptoms of intravascular injection.  Bolus dose: Marcaine 0.25% 5cc x 2 doses (10 cc total) + Fentanyl 100 ugm.  Infusion orders written.    I or my partner am immediately available. I or my partner will monitor the patient and supervise nursing care at necessary intervals.    Erasmo

## 2018-08-05 NOTE — ANESTHESIA POSTPROCEDURE EVALUATION
Patient: Lorna Kenyon    * No procedures listed *    Diagnosis:* No pre-op diagnosis entered *  Diagnosis Additional Information: Pain    Haakenson    Anesthesia Type:  Epidural    Note:  Anesthesia Post Evaluation    Last vitals:  Vitals:    08/05/18 0300 08/05/18 0400 08/05/18 0438   BP: 104/66 109/67 113/73   Pulse:      Resp: 16     Temp: 98.4  F (36.9  C)           Electronically Signed By: Jan Santiago MD  August 5, 2018  8:07 AM

## 2018-08-05 NOTE — H&P
2018        34yo  @ 38.3 wga here for contractions, early labor.  Of note, she was 3 cm in the office earlier this week - upon arrival to [facility], she was found to be 4.5 cm and enid every 2 min.  Denies VB or LOF.  +FM.  PNC is complicated by prior pregnancy with CS for malpresentation.  Prior to CS she had 4 SVDs.  PNC also complicated by HEG requiring home IV infusions, GDMA1 diet controlled, and small AC although last US at 35 wga showed EFW 36%, AC 13%.  There have been no other changes in her medical history based upon review of chart, nursing records, and examination.    PNLs: O pos, ABS neg, RI, RPR NR, HIV NR, HBsAg neg, GBS neg      /78  Pulse 100  Temp 99  F (37.2  C) (Oral)  Resp 18  Wt 73 kg (161 lb)  LMP 11/15/2017  BMI 25.22 kg/m2  GEn: NAD, A&Ox 3  Abd: gravid, NT  SVE: 4.5/80/-3, ballotable BSUS confirms vertex  FHT: cat I reactive  TOCO: Q2 min      A/P: 34yo  @ 38.3 wga here for UCs, early labor.  AVSS.  - prior LTCS for malpresentation.  Pt was counseled on TOLAC risks/benefits in office and pt states she had signed consent, but not found in chart.  Through  phone, pt re-counseled on risks/benefits of TOLAC, including risk of uterine rupture (~1%) that may be life threatening to her and/or baby and require emergency C/S.  Benefits of TOLAC reviewed as well.  Her  score is ~ 75%.  Pt desires to proceed with TOLAC and consent signed, all questions answered.  - pt desires to walk.  When  returns, plan for epidural.    - if no significant cervical change over next couple hours, consider low dose pitocin.  Consider amniotomy once fetal head is more applied  - GBS neg  - EFW 6.5 lb      KARI ROSADO MD

## 2018-08-05 NOTE — PLAN OF CARE
Patient has a language barrier.   speaks english and patient declines .  Waiver signed via phone .
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
## 2018-08-05 NOTE — PLAN OF CARE
Problem: Labor (Cervical Ripen, Induct, Augment) (Adult,Obstetrics,Pediatric)  Goal: Signs and Symptoms of Listed Potential Problems Will be Absent, Minimized or Managed (Labor)  Signs and symptoms of listed potential problems will be absent, minimized or managed by discharge/transition of care (reference Labor (Cervical Ripen, Induct, Augment) (Adult,Obstetrics,Pediatric) CPG).   Outcome: Therapy, progress toward functional goals as expected  0715:  Assumed cares on patient.  Report received from Brittani REA RN.  Anesthesia was paged at 0655 to come place epidural.   0735:  Anesthesia still not here yet.  Anesthesia paged again.   0740:  Dr. Santiago here to place epidural.   0808:  Dr. Bruner here to see patient.  AROM done with clear fluid by Dr. Bruner.  SVE per Dr. Bruner is 6.5/70/-2.  Will continue to monitor closely.   0903:  Dr. Bruner here to see patient again.  SVE done.  SVE 6.5/70/-2.  Orders to increase pitocin.  IUPC placed by Dr. Bruner.  Peanut ball initiated.   1028:  SVE done by Dr. Bruner.  SVE 9 with contraction.  Plan to recheck cervix at 1100.    1100:  SVE done by Dr. Bruner.  SVE 10/100/-2.  Patient set up to start pushing.    1105:  Patient started pushing.  Report given to Buffy Kee RN.   1115:  Dr. Santiago paged to notify him that patient is pushing and having a little extra bleeding.  If patient has to have an emergency , surgery would be in L&D OR per Dr. Santiago.   1120:  NICU paged to come for possible forceps use.    1125:  NICU here.   Milvia Mejia RN
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
## 2018-08-05 NOTE — PROVIDER NOTIFICATION
08/05/18 0255   Provider Notification   Provider Name/Title Dr Franks   Method of Notification Phone   Request Evaluate - Remote   Notification Reason Status Update     Updated MD that will check cervix at 0300 as per plan.  If unchanged start oxytocin slowly.  If cervical change noted recheck in 2 hours.  Category 1 tracing.  Will plan to text page MD with cervical exam.

## 2018-08-06 LAB
GLUCOSE BLDC GLUCOMTR-MCNC: 101 MG/DL (ref 70–99)
HGB BLD-MCNC: 9.5 G/DL (ref 11.7–15.7)
T PALLIDUM AB SER QL: NONREACTIVE

## 2018-08-06 PROCEDURE — 12000029 ZZH R&B OB INTERMEDIATE

## 2018-08-06 PROCEDURE — 00000146 ZZHCL STATISTIC GLUCOSE BY METER IP

## 2018-08-06 PROCEDURE — 85018 HEMOGLOBIN: CPT | Performed by: OBSTETRICS & GYNECOLOGY

## 2018-08-06 PROCEDURE — 25000132 ZZH RX MED GY IP 250 OP 250 PS 637: Performed by: PHYSICIAN ASSISTANT

## 2018-08-06 PROCEDURE — 25000132 ZZH RX MED GY IP 250 OP 250 PS 637: Performed by: OBSTETRICS & GYNECOLOGY

## 2018-08-06 PROCEDURE — 36416 COLLJ CAPILLARY BLOOD SPEC: CPT | Performed by: OBSTETRICS & GYNECOLOGY

## 2018-08-06 PROCEDURE — 25000128 H RX IP 250 OP 636: Performed by: OBSTETRICS & GYNECOLOGY

## 2018-08-06 RX ADMIN — IBUPROFEN 800 MG: 800 TABLET, FILM COATED ORAL at 21:32

## 2018-08-06 RX ADMIN — OXYCODONE HYDROCHLORIDE 5 MG: 5 TABLET ORAL at 13:13

## 2018-08-06 RX ADMIN — ACETAMINOPHEN 975 MG: 325 TABLET, FILM COATED ORAL at 01:27

## 2018-08-06 RX ADMIN — ACETAMINOPHEN 975 MG: 325 TABLET, FILM COATED ORAL at 08:56

## 2018-08-06 RX ADMIN — SODIUM CHLORIDE, SODIUM LACTATE, POTASSIUM CHLORIDE, CALCIUM CHLORIDE AND DEXTROSE MONOHYDRATE: 5; 600; 310; 30; 20 INJECTION, SOLUTION INTRAVENOUS at 02:30

## 2018-08-06 RX ADMIN — OXYCODONE HYDROCHLORIDE 10 MG: 5 TABLET ORAL at 08:57

## 2018-08-06 RX ADMIN — OXYCODONE HYDROCHLORIDE 5 MG: 5 TABLET ORAL at 22:35

## 2018-08-06 RX ADMIN — ACETAMINOPHEN 975 MG: 325 TABLET, FILM COATED ORAL at 17:47

## 2018-08-06 RX ADMIN — SIMETHICONE CHEW TAB 80 MG 80 MG: 80 TABLET ORAL at 22:35

## 2018-08-06 RX ADMIN — SIMETHICONE CHEW TAB 80 MG 80 MG: 80 TABLET ORAL at 15:53

## 2018-08-06 RX ADMIN — OXYCODONE HYDROCHLORIDE 5 MG: 5 TABLET ORAL at 17:47

## 2018-08-06 RX ADMIN — Medication 1 TABLET: at 10:36

## 2018-08-06 RX ADMIN — SENNOSIDES AND DOCUSATE SODIUM 1 TABLET: 8.6; 5 TABLET ORAL at 21:32

## 2018-08-06 ASSESSMENT — ACTIVITIES OF DAILY LIVING (ADL)
SWALLOWING: 0-->SWALLOWS FOODS/LIQUIDS WITHOUT DIFFICULTY
AMBULATION: 0-->INDEPENDENT
TOILETING: 0-->INDEPENDENT
TRANSFERRING: 0-->INDEPENDENT
COGNITION: 0 - NO COGNITION ISSUES REPORTED
RETIRED_COMMUNICATION: 0-->UNDERSTANDS/COMMUNICATES WITHOUT DIFFICULTY
RETIRED_EATING: 0-->INDEPENDENT
FALL_HISTORY_WITHIN_LAST_SIX_MONTHS: NO
DRESS: 0-->INDEPENDENT
BATHING: 0-->INDEPENDENT

## 2018-08-06 NOTE — PLAN OF CARE
Patient declining  in the room with her today. Patient states, via , that she would like and  the remaining days of her stay but today would like to use her sister and the phone. Waiver not signed.

## 2018-08-06 NOTE — PROGRESS NOTES
#1 Post-op .    Pt states she is uncomfortable.  Tolerating food.  Nursing.    Afebrile VSS. HGB drop 11.5 to 9.5. Denies symptoms.  Fundus firm. Light flow. Incision dressing dry.  Urine clear. Output good.  Passing flatus.  Ext: w/o edema or pain.    Will discontinue Dilaudid and Capnography.  Begin oral pain medication.  Encouraged deep breathing.  Blood loss anemia. Start Fe.    Plan routine post-op care.

## 2018-08-06 NOTE — PLAN OF CARE
Problem: Patient Care Overview  Goal: Plan of Care/Patient Progress Review  Outcome: Improving  Data: Vital signs within normal limits. Postpartum checks within normal limits - see flow record. Patient able to tolerate eating small bites of crackers, and drinking normally. Patient has catheter in place output WNL, able to tolerate minor positioon changes in bed independently. No apparent signs of infection. Patient needing assist with self and infant cares, has bottle/formula fed with assist from family support at bedside.  Incision appears within normal. Is using PCA pump/dilauded for pain, pump cleared with second RN at shift change.  Rested in bed this shift.  Action: Patient medicated during the shift for pain. See MAR. Patient education done, see flow record.  Response: Positive attachment behaviors observed with infant. Patient's support person present this shift.   Plan: Continue current plan of care.  Anticipate discharge on 8/9/18.

## 2018-08-06 NOTE — PLAN OF CARE
Problem: Patient Care Overview  Goal: Plan of Care/Patient Progress Review  Outcome: Improving  VSS. Capnography WNL. Patient denies having much pain and occas. utilizes PCA dilaudid. Also taking scheduled tylenol. D5 LR infusing. Merchant patent with adequate output. GDM- diet contr. Tolerating crackers . SO at bedside, supportive, also helping to interpret. Stood at bedside with 2 assist and marched in place. Pad changed. C/o itching but declines meds. Dressing to incision with scant drainage- unchanged tonight. Minimal bleeding. Plans to both breast/formula but requesting infant go to nursery all night and formula feed so she can rest.

## 2018-08-06 NOTE — PLAN OF CARE
Problem: Patient Care Overview  Goal: Plan of Care/Patient Progress Review  Patient is complaining of gas pains.  Gave patient simethicone and told her to walk in room and duncan to get gas moving.  States that she did pass flatus and feels some relief.  Is breast and bottling infant and does well with breastfeeding.  Will continue to monitor and encourage patient to be more active and prepare for discharge.  Meagan Iglesias

## 2018-08-06 NOTE — PROGRESS NOTES
Called Dr. TUNDE Guerrero regarding 2 small openings in incision. Approx. 1/2 inch long and 1 cm. opened. Per Dr Guerrero. Dry area and apply steri strips.

## 2018-08-06 NOTE — PROGRESS NOTES
Patient meeting expected goals this shift. PCA pump discontinued, pain managed by oral meds. Patient up to shower and ambulated in room this shift. Dressing removed two small separations in incision, approx 1/2 inch long by 1 cm. Opened, Steri strips applied. Patient started on oral iron, hemoglobin ordered for AM. Patient declined interpretor today, would prefer to use phone, but would like interpretor to come tomorrow.
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
## 2018-08-06 NOTE — ANESTHESIA POSTPROCEDURE EVALUATION
Patient: Lorna Kenyon    * No procedures listed *    Diagnosis:* No pre-op diagnosis entered *  Diagnosis Additional Information: Pain    Haakenson    Anesthesia Type:  Epidural    Note:  Anesthesia Post Evaluation    Patient location during evaluation: Floor  Patient participation: Able to fully participate in evaluation  Level of consciousness: awake  Pain management: adequate  Airway patency: patent  Cardiovascular status: acceptable  Respiratory status: acceptable  Hydration status: euvolemic  PONV: controlled     Anesthetic complications: None    Comments: S/P epidural for labor and C/S. I or my partner remained immediately available, monitored the patient, and supervised nursing staff at key events and necessary intervals.    The patient is doing well. VSS Temp normal. Satisfactory cardiovascular and repiratory function. Neuro at baseline. Denies positional headache. Minimal side effects easily managed w/ PRN meds. No apparent anesthetic complications. No follow-up required.    [email or web address]        Last vitals:  Vitals:    08/05/18 2146 08/05/18 2339 08/06/18 0407   BP: 115/56 (!) 88/52 (!) 81/52   Pulse:      Resp: 18 18 24   Temp: 99  F (37.2  C) 98.9  F (37.2  C) 98.5  F (36.9  C)   SpO2: 98% 98% 96%         Electronically Signed By: Jan Santiago MD  August 6, 2018  8:20 AM

## 2018-08-07 LAB
COPATH REPORT: NORMAL
HGB BLD-MCNC: 9.3 G/DL (ref 11.7–15.7)

## 2018-08-07 PROCEDURE — 12000029 ZZH R&B OB INTERMEDIATE

## 2018-08-07 PROCEDURE — 85018 HEMOGLOBIN: CPT | Performed by: PHYSICIAN ASSISTANT

## 2018-08-07 PROCEDURE — 25000132 ZZH RX MED GY IP 250 OP 250 PS 637: Performed by: PHYSICIAN ASSISTANT

## 2018-08-07 PROCEDURE — 25000132 ZZH RX MED GY IP 250 OP 250 PS 637: Performed by: OBSTETRICS & GYNECOLOGY

## 2018-08-07 PROCEDURE — 36415 COLL VENOUS BLD VENIPUNCTURE: CPT | Performed by: PHYSICIAN ASSISTANT

## 2018-08-07 RX ADMIN — ACETAMINOPHEN 975 MG: 325 TABLET, FILM COATED ORAL at 17:40

## 2018-08-07 RX ADMIN — OXYCODONE HYDROCHLORIDE 5 MG: 5 TABLET ORAL at 19:29

## 2018-08-07 RX ADMIN — SIMETHICONE CHEW TAB 80 MG 80 MG: 80 TABLET ORAL at 11:19

## 2018-08-07 RX ADMIN — Medication 1 TABLET: at 11:17

## 2018-08-07 RX ADMIN — ACETAMINOPHEN 975 MG: 325 TABLET, FILM COATED ORAL at 01:34

## 2018-08-07 RX ADMIN — SENNOSIDES AND DOCUSATE SODIUM 2 TABLET: 8.6; 5 TABLET ORAL at 19:29

## 2018-08-07 RX ADMIN — IBUPROFEN 800 MG: 800 TABLET, FILM COATED ORAL at 04:50

## 2018-08-07 RX ADMIN — OXYCODONE HYDROCHLORIDE 10 MG: 5 TABLET ORAL at 09:12

## 2018-08-07 RX ADMIN — SENNOSIDES AND DOCUSATE SODIUM 1 TABLET: 8.6; 5 TABLET ORAL at 11:18

## 2018-08-07 RX ADMIN — ACETAMINOPHEN 975 MG: 325 TABLET, FILM COATED ORAL at 09:12

## 2018-08-07 RX ADMIN — SIMETHICONE CHEW TAB 80 MG 80 MG: 80 TABLET ORAL at 17:41

## 2018-08-07 RX ADMIN — IBUPROFEN 600 MG: 600 TABLET ORAL at 10:59

## 2018-08-07 NOTE — PLAN OF CARE
Data: Vital signs within normal limits. Postpartum checks within normal limits - see flow record. Patient eating and drinking normally. Patient able to empty bladder independently and is up ambulating. No apparent signs of infection. Incision healing well. Patient performing self cares and is able to care for infant.  Action: Patient medicated during the shift for pain and cramping. See MAR. Patient reassessed within 1 hour after each medication and pain was improved - patient stated she was comfortable. Patient education done about self and infant cares. See flow record.  Response: Positive attachment behaviors observed with infant. Support persons  present.   Plan: Anticipate discharge on 8/8/18.

## 2018-08-07 NOTE — PROGRESS NOTES
August 7, 2018      SUBJECTIVE: No acute overnight events.  Pain adequately controlled.  +Lochia, light.  Tolerating PO.  No Flatus yet.  Ambulating/urinating w/o difficulty.  Denies CP/palp/SOB/LH.  Complains of bloating.   present.    OBJECTIVE: BP (!) 89/56  Pulse 100  Temp 98.4  F (36.9  C) (Oral)  Resp 18  Wt 73 kg (161 lb)  LMP 11/15/2017  SpO2 98%  Breastfeeding? Unknown  BMI 25.22 kg/m2  Gen: NAD, A&O x3  Abd: soft.  Incision C/D/I, no active bleeding.  No erythema, induration, or discharge; Mild distention but soft  Ext: minimal edema BL LEs, symmetric, no CT    Hemoglobin   Date Value Ref Range Status   08/07/2018 9.3 (L) 11.7 - 15.7 g/dL Final   08/06/2018 9.5 (L) 11.7 - 15.7 g/dL Final     Comment:     Called to  VITALY HUNTER AT 0720T ON 8.6.18 BY Md     ]    A/P: POD#2 s/p repeat LTCS, after prolonged 2nd stage with arrest of descent, failed TOLAC.  Doing well.  Afebrile, VSS.  - ibuprofen/oxycodone/tylenol PRN pain  - regular diet as tolerated  - breastfeeding  - encouraged increasing ambulation  - stool softener BID  - routine post-op care        KARI ROSADO MD

## 2018-08-07 NOTE — PLAN OF CARE
Problem: Patient Care Overview  Goal: Plan of Care/Patient Progress Review  Outcome: Improving  Pt able to get some rest w/  rooming-in for feedings.  Ambulating in room w/o encouragement.  States ordered pain medications keeping her comfortable.  C/O gas discomfort; +BS X 4 quads and passing some flatus.  Given heating pad and encouraged to continue to ambulate to aid in passing more flatus.  Approximated incision w/ intact steri-strips is clean and dry.  FOB present and supportive.  Continue to monitor.

## 2018-08-07 NOTE — PLAN OF CARE
here at 0900. Patient declined  as she wanted to sleep. States she will use the  phone this afternoon. Anticipated discharge date is tomorrow. Patient requesting  for noon tomorrow. Patient's sister at bedside, she speaks English. Patieint instructed that interpretor will be scheduled at 0900 tomorrow for OB and Peds doctor visit's and discharge teaching. Fluids, ambulation, activity and rest periods encouraged. Breast feeding every 2-3 hours encouraged. Patient verbalized understanding through her sister.

## 2018-08-08 VITALS
SYSTOLIC BLOOD PRESSURE: 102 MMHG | OXYGEN SATURATION: 98 % | TEMPERATURE: 98.6 F | HEART RATE: 100 BPM | DIASTOLIC BLOOD PRESSURE: 67 MMHG | BODY MASS INDEX: 25.22 KG/M2 | WEIGHT: 161 LBS | RESPIRATION RATE: 18 BRPM

## 2018-08-08 PROCEDURE — 25000132 ZZH RX MED GY IP 250 OP 250 PS 637: Performed by: OBSTETRICS & GYNECOLOGY

## 2018-08-08 PROCEDURE — 40000083 ZZH STATISTIC IP LACTATION SERVICES 1-15 MIN

## 2018-08-08 PROCEDURE — 25000132 ZZH RX MED GY IP 250 OP 250 PS 637: Performed by: PHYSICIAN ASSISTANT

## 2018-08-08 RX ORDER — IBUPROFEN 800 MG/1
800 TABLET, FILM COATED ORAL EVERY 6 HOURS PRN
Qty: 90 TABLET | Refills: 0 | Status: SHIPPED | OUTPATIENT
Start: 2018-08-08 | End: 2018-12-07

## 2018-08-08 RX ORDER — OXYCODONE HYDROCHLORIDE 5 MG/1
5-10 TABLET ORAL
Qty: 20 TABLET | Refills: 0 | Status: ON HOLD | OUTPATIENT
Start: 2018-08-08 | End: 2023-10-22

## 2018-08-08 RX ORDER — ACETAMINOPHEN 325 MG/1
975 TABLET ORAL EVERY 8 HOURS
Qty: 100 TABLET | Refills: 0 | Status: SHIPPED | OUTPATIENT
Start: 2018-08-08 | End: 2018-12-07

## 2018-08-08 RX ORDER — AMOXICILLIN 250 MG
2 CAPSULE ORAL 2 TIMES DAILY PRN
Qty: 100 TABLET | Refills: 0 | Status: ON HOLD | OUTPATIENT
Start: 2018-08-08 | End: 2023-10-22

## 2018-08-08 RX ADMIN — OXYCODONE HYDROCHLORIDE 5 MG: 5 TABLET ORAL at 11:11

## 2018-08-08 RX ADMIN — OXYCODONE HYDROCHLORIDE 5 MG: 5 TABLET ORAL at 00:18

## 2018-08-08 RX ADMIN — Medication 1 TABLET: at 09:37

## 2018-08-08 RX ADMIN — ACETAMINOPHEN 975 MG: 325 TABLET, FILM COATED ORAL at 01:26

## 2018-08-08 RX ADMIN — ACETAMINOPHEN 975 MG: 325 TABLET, FILM COATED ORAL at 09:37

## 2018-08-08 RX ADMIN — SENNOSIDES AND DOCUSATE SODIUM 1 TABLET: 8.6; 5 TABLET ORAL at 09:45

## 2018-08-08 RX ADMIN — OXYCODONE HYDROCHLORIDE 5 MG: 5 TABLET ORAL at 06:26

## 2018-08-08 RX ADMIN — IBUPROFEN 800 MG: 800 TABLET, FILM COATED ORAL at 08:07

## 2018-08-08 NOTE — PROGRESS NOTES
Patient meeting expected goals this shift. Able to do all self cares and cares for . Using tylenol and oxycodone for pain. Bonding well with . Patient did complain of gas pains earlier in shift, was able to have bowel movement with some relief. Patient walking and using simethicone. Plan to discharge tomorrow.

## 2018-08-08 NOTE — LACTATION NOTE
LC to see patient with  prior to dc.  Her baby has been taking formula and her breasts and nipples are sore.  LC encouraged her to nurse often to help decrease engorgement.  LC also gave her nipple cream and hydrogel to help with nipple discomfort.  Instructions on use were reviewed through .  T[facility] advised her to use her pump if baby is unable to relieve engorgement.  Exclusive breastfeeding was also encouraged.

## 2018-08-08 NOTE — DISCHARGE INSTRUCTIONS
Birth Discharge Instructions: [place]  Waxqabadka    Sharma qaadin wax kabadan 10 roodal 6 asbuuc kadib qalliinka. Waydii qoyska iyo saxibadaa caawin mark[facility]ad u [facility].     Sharma wadin gaadhi markaad qaadanayso kaniiniyada xanunka ee uu dhakhtarkaagu kuu qoro. Waxaad wadi kartaa gadhi haddii aad qaadanayso kaniiniyada xanuunka ee koontarka.     Lama ogala jimicsi adag ama howl culus 6 asbuuc. Sharma samayn wax dhib ku keeni maegan meesha qalliinka lagu sameeeyay.    Sharma kendra jiidin adoo aad u doconaya markaad isticmaalayso musqusha. Kooxdaada daryeelka ayaa waxay ku qori doonaan wax saxarada jilciya haddii ay dhibaato kaa haysato saxarada oo adag (caloosha oo fadhida) .    Ka taxadar meesha la qalay:    Meesha la qalay nadiif sharma ahaato hana qallalnaato    Sharma ku buuxin meesha la qalay biyo. Dabaal ama tuubooyinka biyaha kulul lama ogala ilaa uu qalliinku rosie ahaanba bogsado. Waxaad isku furi kartaa tuubada qabayska haddii heerka biyuhu ay ka hoooseeyaan meesha qalliinka.    Dhaqidda kadib, meesha qalliinka si fican u qalaji. Sidoo kale ku desiree qalajinta maqarka u dhow meesha qaliinka ee ay istaaban karaan.     Ha isticmaalin wax subkid, jeel, amisha, looshin ama wax kale oo aad mariso meesha qalliinka.    Dharkaaga u xiri qaab wanaagsan si aysan cadaadis ugu samayn meesha la qalay  (tusaale ahaan fiiri say u kendra jiidayso kastuumadaadu)     Haddii aad leedahay Qodabada Latolay, faraha ka qaad tolmada. Iyagaa iskood isaga dhacaya ama waxaad siibi kartaa regan asbuuc kadib.    Waxaad arki kartaa cadad leni oo michelle cad ah ama binki ah waana iska caadi. U taga bixiyahaaga daryeka caafimaad:    Haddii raadka tolmuhu uu waynaado ama uu ur keeno.    Haddii aad ku yeelato meesha la tolay barar, guduudasho, ama cun cun.    Haddii aad yeelato xanuun kordhaya oo xanuunkaagana aysan daawadu wax ka tarayn.     Haddii aad qabto qandho  100.4  F (38  C) am aka saraysa (heerkulka laga qaaday carabkaaga hoostiisa), oo qarqaryo leh  ama aan lahayn     Meesha u dhow meesha la qalay ([facility] qalliinka) waxaad ka dareemi doontaa inaysan dareen lahayn. John waa caadi. Dareen la aantu waxay u dhamaan doontaa wax kayar sanad.     Gacmahaagga nadiifi:  Markasta dhaqa gacahaaga ka hor inta aadan taaban farjiga agagaarkiisa  iyo meesha la tolay. John waxay caawiniaysaa inuu yaraado infakshanku. Haddii gacmahaagu aysan wasakh lahayn, waxaad isticmaali kartaa aalkalo aad gacmaha ku tirtirto si aad u nadiifiso gacmahaaga. Cidiyahaaga nadiifi ooo jar.    Wac bixiyahaaga daryeelka caafimaaad haddii aad qabtid mid ka  mid ah [facility] kendall socda:    Haddii suufka dhiigga nuuga uu ku buuxsamo 1 saac gudihiis, ama aad aragto xinjiro dhiig ah oo ka wayn kubadda golafka.    Dhiig soconaya wax kabadan 6 asbuuc.    Haddii aad qabto dheecaan farjiga ka imaanaya oo  si xun u uraya.    Luiz, nabar, majiirid daran oo aad ka dareeto qaybta hoose ee ubucda.    Kaadi badan oo dagdag ah oo markasta ku qabanaysa, ama gubasho aad dareento markaad kaajayso.    Lalabbo ama matag.    Guduudasho, barar, ama xanuun aad ka dareento xididada lugta.    Dhibaato kaa haysata naas nuujinta, ama guduudasho ama xanuun naaska ah.    Xabad xanuun iyo qufac ama naqaska oo kugu dhagaya.    Dhibaato ay la socoto murugo, walaac, aa walwal.   Haddii aad qavictor hugo reddy [facility] ty pope.     Haddii aad qabto tanna bailey noart ac.       Birth Discharge Instructions  Follow up with OB in 6 weeks for postpartum check.  (patients discussed with MD follow up in one week as well    [facility] Lactation: [phone]  Activity    Do not lift more than 10 pounds for 6 weeks after surgery. Ask family and friends for help when you need it.    Do not drive while taking pain pills prescribed by your doctor. You may drive if taking over-the-counter pain pills.    No heavy exercise or activity  for 6 weeks. Don t do anything that will put a strain on your surgery site.    Don t strain when using the toilet. Your care team may prescribe a stool softener if you have problems with your bowel movements (constipation).    To care for your incision:    Keep the incision clean and dry    Do not soak your incision in water. No swimming or hot tubs until your incision has fully healed. You may soak in the bathtub if the water level is below your incision.    After washing, dry your incision well. Include the skin that may come in contact with your incision.    Do not use any peroxide, gel, cream, lotion, or ointment on your incision.     Adjust your clothes to avoid pressure on your surgery site (check the elastic in your underwear for example)    If you have Steri-Strips, leave the small strips of tape in place. They will fall off on their own, or you can remove them after one week.    You may see a small amount of clear or pink drainage and this is normal. Check with your health care provider:    If the drainage increases or has an odor.    If you have swelling, redness, or a rash around the incision.    If you have increased pain and your pain medicine doesn t help    If you have a fever of 100.4  F (38  C) or higher (temperature taken under your tongue), with or without chills   The area around your incision (surgery wound) will feel numb. This is normal. The numbness should go away in less than a year.   Keep your hands clean:   Always wash your hands before touching your incision. This helps reduce your risk of infection. If your hands aren t dirty, you may use an alcohol hand-rub to clean your hands. Keep your nails clean and short.     Call your health care provider if you have any of these symptoms:    You soak a sanitary pad with blood within 1 hour, or you see blood clots larger than a golf ball.    Bleeding that lasts more than 6 weeks.    You have vaginal discharge that smells bad.    Severe pain,  cramping or tenderness in your lower belly area.    A more frequent or urgent need to urinate (pee), or it burns when you pee.    Nausea and vomiting.    Redness, swelling or pain around a vein in your leg.    Problems breastfeeding, or a red or painful area on your breast.    If you have chest pain and cough or are gasping for air.    Problems coping with sadness, anxiety, or depression.     If you have any concerns about hurting yourself of the baby, call your doctor right away.      You have questions or concerns after you return home.

## 2018-08-08 NOTE — PLAN OF CARE
Problem: Patient Care Overview  Goal: Discharge Needs Assessment  Outcome: Adequate for Discharge Date Met: 08/08/18  AVS/DC teaching and medications reviewed with patient. Patient is aware of when to call and follow-up. Patient is aware of resources available.  Aware of [place] scale and when to retake and call.Teaching is completed, no questions. Discharged to home with baby.

## 2018-08-08 NOTE — PROGRESS NOTES
[facility]   Obstetrics Progress Note    Subjective: This is the patient's third day since  delivery. She is doing well.  She is urinating on her own. Pain is controlled with medication. She notes that she has significant gas. She has started to pass stool. Has had minimal ambulation and using ample oxycodone.     Objective:   All vitals stable  Temp: 97.7  F (36.5  C) Temp src: Oral BP: 96/63   Heart Rate: 98 Resp: 18          EXAM:  Constitutional: healthy, alert, no distress.   Abdomen: Abdomen soft, Moderate distention, no guarding or rebound. +BS. No masses  JOINT/EXTREMITIES: extremities normal   Incision: clean, dry and intact    Last hemoglobin was   Hemoglobin   Date Value Ref Range Status   2018 9.3 (L) 11.7 - 15.7 g/dL Final   ]    Assessment: Stable postpartum course.    Plan: Routine care. Ambulation encouraged  Breast feeding strategies discussed  Pain control measures as needed  Reportable signs and symptoms dicussed with the patient  Discussed ambulation, fluids, the function of: postpartum, surgery and narcotics on bowel function as well as symptomatic treatment and warning signs.   Discharge later today    Telma Herrera

## 2018-08-08 NOTE — PLAN OF CARE
"Problem: Patient Care Overview  Goal: Plan of Care/Patient Progress Review  Outcome: Adequate for Discharge Date Met: 08/08/18  Meeting goals for shift and discharge to home, see flow sheet. Rates pain at \"'5\" and is using IBU, oxycodone, tylenol and IBU, and abdominal binder. Caring for self and infant in room. AVS/DC teaching and medications reviewed with and given to patient,  present. Instructions in Ug[place]an and englsih. Patient is aware of when to call and follow-up. Patient is aware of resources available.  Aware of [place] scale and when to retake and call.Teaching is completed. Patient and spouse have no questions.      "
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
## 2018-12-07 ENCOUNTER — HOSPITAL ENCOUNTER (EMERGENCY)
Facility: CLINIC | Age: 33
Discharge: HOME | End: 2018-12-07
Attending: EMERGENCY MEDICINE | Admitting: EMERGENCY MEDICINE
Payer: MEDICAID

## 2018-12-07 VITALS
OXYGEN SATURATION: 98 % | TEMPERATURE: 97.7 F | SYSTOLIC BLOOD PRESSURE: 105 MMHG | RESPIRATION RATE: 16 BRPM | DIASTOLIC BLOOD PRESSURE: 79 MMHG | HEART RATE: 79 BPM

## 2018-12-07 DIAGNOSIS — J02.0 ACUTE STREPTOCOCCAL PHARYNGITIS: ICD-10-CM

## 2018-12-07 LAB
DEPRECATED S PYO AG THROAT QL EIA: ABNORMAL
SPECIMEN SOURCE: ABNORMAL

## 2018-12-07 PROCEDURE — 87880 STREP A ASSAY W/OPTIC: CPT | Performed by: EMERGENCY MEDICINE

## 2018-12-07 PROCEDURE — 96372 THER/PROPH/DIAG INJ SC/IM: CPT

## 2018-12-07 PROCEDURE — 25000132 ZZH RX MED GY IP 250 OP 250 PS 637: Performed by: EMERGENCY MEDICINE

## 2018-12-07 PROCEDURE — 25000128 H RX IP 250 OP 636: Performed by: EMERGENCY MEDICINE

## 2018-12-07 PROCEDURE — 99284 EMERGENCY DEPT VISIT MOD MDM: CPT | Mod: 25

## 2018-12-07 RX ORDER — IBUPROFEN 400 MG/1
400 TABLET, FILM COATED ORAL EVERY 6 HOURS PRN
Qty: 30 TABLET | Refills: 0 | Status: SHIPPED | OUTPATIENT
Start: 2018-12-07 | End: 2019-07-30

## 2018-12-07 RX ORDER — OXYCODONE HYDROCHLORIDE 5 MG/1
5 TABLET ORAL EVERY 6 HOURS PRN
Qty: 4 TABLET | Refills: 0 | Status: SHIPPED | OUTPATIENT
Start: 2018-12-07 | End: 2019-07-30

## 2018-12-07 RX ORDER — ACETAMINOPHEN 325 MG/1
975 TABLET ORAL EVERY 8 HOURS
Qty: 100 TABLET | Refills: 0 | Status: SHIPPED | OUTPATIENT
Start: 2018-12-07 | End: 2019-07-30

## 2018-12-07 RX ORDER — IBUPROFEN 600 MG/1
600 TABLET, FILM COATED ORAL ONCE
Status: COMPLETED | OUTPATIENT
Start: 2018-12-07 | End: 2018-12-07

## 2018-12-07 RX ADMIN — PENICILLIN G BENZATHINE 1.2 MILLION UNITS: 600000 INJECTION, SUSPENSION INTRAMUSCULAR at 11:04

## 2018-12-07 RX ADMIN — IBUPROFEN 600 MG: 600 TABLET ORAL at 10:23

## 2018-12-07 ASSESSMENT — ENCOUNTER SYMPTOMS
COUGH: 0
FEVER: 1
TROUBLE SWALLOWING: 1
SORE THROAT: 1

## 2018-12-07 NOTE — ED AVS SNAPSHOT
[facility] Emergency Department    [address]    [facility] [address]    Phone:  [phone]    Fax:  [phone]                                       Lorna Kenyon   MRN: [id number]    Department:  [facility] Emergency Department   Date of Visit:  2018           Patient Information     Date Of Birth          1985        Your diagnoses for this visit were:     Acute streptococcal pharyngitis      delivery delivered        You were seen by Taqueria Bella MD.      Follow-up Information     Go to [facility] Emergency Department.    Specialty:  EMERGENCY MEDICINE    Why:  If symptoms worsen    Contact information:    [address]  [facility] [address] [phone]        Discharge Instructions         Strep Throat  Strep throat is a throat infection caused by a bacteria called group A Streptococcus bacteria (group A strep). The bacteria live in the nose and throat. Strep throat is contagious and spreads easily from person to person through airborne droplets when an infected person coughs, sneezes, or talks. Good hand washing is important to help prevent the spread of this illness.  Children diagnosed with strep throat should not attend school or  until they have been taking antibiotics and had no fever for 24 hours.  Strep throat mainly affects school-aged children between 5 and 15 years of age, but can affect adults too. When it isn't treated, it can lead to serious problems including rheumatic fever (an inflammation of the joints and heart) and kidney damage.    How is strep throat spread?  Strep throat can be easily spread from an infected person's saliva by:    Drinking and eating after them    Sharing a straw, cup, toothbrushes, and eating utensils  When to go to the emergency room (ER)  Call 911 if your child has trouble breathing or swallowing. Call your healthcare provider about other symptoms of strep  throat, such as:    Throat pain, especially when swallowing    Red, swollen tonsils    Swollen lymph glands    Stomachache; sometimes, vomiting in younger children    Pus in the back of the throat  What to expect in the ER    Your child will be examined and the healthcare provider will ask about his or her health history.    The child's tonsils will be examined. A sample of fluid may be taken from the back of the throat using a soft swab. The sample can be checked right away for the bacteria that cause strep throat. Another sample may also be sent to a lab for testing.    An antibiotic is usually prescribed to kill the bacteria. Be sure your child takes all the medicine, even if he or she starts to feel better. Antibiotics will not help a viral throat infection.    If swallowing is very painful, painkilling medicine may also be prescribed.  When to call your healthcare provider  Call your healthcare provider if your otherwise healthy child has finished the treatment for strep throat and has:    Joint pain or swelling    Shortness of breath    Signs of dehydration (no tears when crying and not urinating for more than 8 hours)    Ear pain or pressure    Headaches    Rash    Fever (see Fever and children, below)  Fever and children  Always use a digital thermometer to check your child s temperature. Never use a mercury thermometer.  For infants and toddlers, be sure to use a rectal thermometer correctly. A rectal thermometer may accidentally poke a hole in (perforate) the rectum. It may also pass on germs from the stool. Always follow the product maker s directions for proper use. If you don t feel comfortable taking a rectal temperature, use another method. When you talk to your child s healthcare provider, tell him or her which method you used to take your child s temperature.  Here are guidelines for fever temperature. Ear temperatures aren t accurate before 6 months of age. Don t take an oral temperature until your  child is at least 4 years old.  Infant under 3 months old:    Ask your child s healthcare provider how you should take the temperature.    Rectal or forehead (temporal artery) temperature of 100.4 F (38 C) or higher, or as directed by the provider    Armpit temperature of 99 F (37.2 C) or higher, or as directed by the provider  Child age 3 to 36 months:    Rectal, forehead (temporal artery), or ear temperature of 102 F (38.9 C) or higher, or as directed by the provider    Armpit temperature of 101 F (38.3 C) or higher, or as directed by the provider  Child of any age:    Repeated temperature of 104 F (40 C) or higher, or as directed by the provider    Fever that lasts more than 24 hours in a child under 2 years old. Or a fever that lasts for 3 days in a child 2 years or older.   Easing strep throat symptoms  These tips can help ease your child's symptoms:    Offer easy-to-swallow foods, such as soup, applesauce, popsicles, cold drinks, milk shakes, and yogurt.    Provide a soft diet and avoid spicy or acidic foods.    Use a cool-mist humidifier in the child's bedroom.    Gargle with saltwater (for older children and adults only). Mix 1/4 teaspoon salt in 1 cup (8 oz) of warm water.   Date Last Reviewed: 1/1/2017 2000-2018 [employer]. [address] [place] [address]. All rights reserved. This information is not intended as a substitute for professional medical care. Always follow your healthcare professional's instructions.          24 Hour Appointment Hotline       To make an appointment at any [facility], call [phone] ([phone]). If you don't have a family doctor or clinic, we will help you find one. [facility] clinics are conveniently located to serve the needs of you and your family.             Review of your medicines      CONTINUE these medicines which may have CHANGED, or have new prescriptions. If we are uncertain of the size of tablets/capsules you have at home,  strength may be listed as something that might have changed.        Dose / Directions Last dose taken    ibuprofen 400 MG tablet   Commonly known as:  ADVIL/MOTRIN   Dose:  400 mg   What changed:    - medication strength  - how much to take  - reasons to take this   Quantity:  30 tablet        Take 1 tablet (400 mg) by mouth every 6 hours as needed for moderate pain (cramping)   Refills:  0        * oxyCODONE 5 MG tablet   Commonly known as:  ROXICODONE   Dose:  5-10 mg   What changed:  Another medication with the same name was added. Make sure you understand how and when to take each.   Quantity:  20 tablet        Take 1-2 tablets (5-10 mg) by mouth every 3 hours as needed for other (pain control or improvement in physical function. Hold dose for analgesic side effects.)   Refills:  0        * oxyCODONE 5 MG tablet   Commonly known as:  ROXICODONE   Dose:  5 mg   What changed:  You were already taking a medication with the same name, and this prescription was added. Make sure you understand how and when to take each.   Quantity:  4 tablet        Take 1 tablet (5 mg) by mouth every 6 hours as needed for breakthrough pain, moderate to severe pain or severe pain   Refills:  0        * Notice:  This list has 2 medication(s) that are the same as other medications prescribed for you. Read the directions carefully, and ask your doctor or other care provider to review them with you.      Our records show that you are taking the medicines listed below. If these are incorrect, please call your family doctor or clinic.        Dose / Directions Last dose taken    acetaminophen 325 MG tablet   Commonly known as:  TYLENOL   Dose:  975 mg   Quantity:  100 tablet        Take 3 tablets (975 mg) by mouth every 8 hours   Refills:  0        PRENATAL VITAMIN PO   Dose:  1 tablet        Take 1 tablet by mouth At Bedtime   Refills:  0        senna-docusate 8.6-50 MG tablet   Commonly known as:  SENOKOT-S/PERICOLACE   Dose:  2 tablet    Quantity:  100 tablet        Take 2 tablets by mouth 2 times daily as needed for constipation   Refills:  0                Information about OPIOIDS     PRESCRIPTION OPIOIDS: WHAT YOU NEED TO KNOW   We gave you an opioid (narcotic) pain medicine. It is important to manage your pain, but opioids are not always the best choice. You should first try all the other options your care team gave you. Take this medicine for as short a time (and as few doses) as possible.    Some activities can increase your pain, such as bandage changes or therapy sessions. It may help to take your pain medicine 30 to 60 minutes before these activities. Reduce your stress by getting enough sleep, working on hobbies you enjoy and practicing relaxation or meditation. Talk to your care team about ways to manage your pain beyond prescription opioids.    These medicines have risks:    DO NOT drive when on new or higher doses of pain medicine. These medicines can affect your alertness and reaction times, and you could be arrested for driving under the influence (DUI). If you need to use opioids long-term, talk to your care team about driving.    DO NOT operate heavy machinery    DO NOT do any other dangerous activities while taking these medicines.    DO NOT drink any alcohol while taking these medicines.     If the opioid prescribed includes acetaminophen, DO NOT take with any other medicines that contain acetaminophen. Read all labels carefully. Look for the word  acetaminophen  or  Tylenol.  Ask your pharmacist if you have questions or are unsure.    You can get addicted to pain medicines, especially if you have a history of addiction (chemical, alcohol or substance dependence). Talk to your care team about ways to reduce this risk.    All opioids tend to cause constipation. Drink plenty of water and eat foods that have a lot of fiber, such as fruits, vegetables, prune juice, apple juice and high-fiber cereal. Take a laxative (Miralax, milk  of magnesia, Colace, Senna) if you don t move your bowels at least every other day. Other side effects include upset stomach, sleepiness, dizziness, throwing up, tolerance (needing more of the medicine to have the same effect), physical dependence and slowed breathing.    Store your pills in a secure place, locked if possible. We will not replace any lost or stolen medicine. If you don t finish your medicine, please throw away (dispose) as directed by your pharmacist. The [place] Pollution Control Agency has more information about safe disposal: https://[email or web address].[place].us/living-green/managing-unwanted-medications        Prescriptions were sent or printed at these locations (3 Prescriptions)                   Other Prescriptions                Printed at Department/Unit printer (3 of 3)         acetaminophen (TYLENOL) 325 MG tablet               ibuprofen (ADVIL/MOTRIN) 400 MG tablet               oxyCODONE (ROXICODONE) 5 MG tablet                Procedures and tests performed during your visit     Rapid strep screen      Orders Needing Specimen Collection     None      Pending Results     No orders found from 12/5/2018 to 12/8/2018.            Pending Culture Results     No orders found from 12/5/2018 to 12/8/2018.            Pending Results Instructions     If you had any lab results that were not finalized at the time of your Discharge, you can call the ED Lab Result RN at [phone]. You will be contacted by this team for any positive Lab results or changes in treatment. The nurses are available 7 days a week from 10A to 6:30P.  You can leave a message 24 hours per day and they will return your call.        Test Results From Your Hospital Stay        12/7/2018 10:40 AM      Component Results     Component    Specimen Description    Throat    Rapid Strep A Screen (Abnormal)    POSITIVE: Group A Streptococcal antigen detected by immunoassay.                Clinical Quality Measure: Blood Pressure Screening   "   Your blood pressure was checked while you were in the emergency department today. The last reading we obtained was  BP: 105/79 . Please read the guidelines below about what these numbers mean and what you should do about them.  If your systolic blood pressure (the top number) is less than 120 and your diastolic blood pressure (the bottom number) is less than 80, then your blood pressure is normal. There is nothing more that you need to do about it.  If your systolic blood pressure (the top number) is 120-139 or your diastolic blood pressure (the bottom number) is 80-89, your blood pressure may be higher than it should be. You should have your blood pressure rechecked within a year by a primary care provider.  If your systolic blood pressure (the top number) is 140 or greater or your diastolic blood pressure (the bottom number) is 90 or greater, you may have high blood pressure. High blood pressure is treatable, but if left untreated over time it can put you at risk for heart attack, stroke, or kidney failure. You should have your blood pressure rechecked by a primary care provider within the next 4 weeks.  If your provider in the emergency department today gave you specific instructions to follow-up with your doctor or provider even sooner than that, you should follow that instruction and not wait for up to 4 weeks for your follow-up visit.        Thank you for choosing [facility]       Thank you for choosing [facility] for your care. Our goal is always to provide you with excellent care. Hearing back from our patients is one way we can continue to improve our services. Please take a few minutes to complete the written survey that you may receive in the mail after you visit with us. Thank you!        [employer] Information     [employer] lets you send messages to your doctor, view your test results, renew your prescriptions, schedule appointments and more. To sign up, go to [email or web address] . Click on \"Log in\" on " "the left side of the screen, which will take you to the Welcome page. Then click on \"Sign up Now\" on the right side of the page.     You will be asked to enter the access code listed below, as well as some personal information. Please follow the directions to create your username and password.     Your access code is: [email or web address]  Expires: 3/7/2019 11:19 AM     Your access code will  in 90 days. If you need help or a new code, please call your [facility] clinic or [phone].        Care EveryWhere ID     This is your Care EveryWhere ID. This could be used by other organizations to access your [facility] medical records  [id number]        Equal Access to Services     LOGAN SON : Jeaneth Ledesma, regino alexadner, rafaela merida, matthias drake. So [facility] [phone].    ATENCIÓN: Si habla español, tiene a cisse disposición servicios gratuitos de asistencia lingüística. Llame al [phone].    We comply with applicable federal civil rights laws and [place] laws. We do not discriminate on the basis of race, color, national origin, age, disability, sex, sexual orientation, or gender identity.            After Visit Summary       This is your record. Keep this with you and show to your community pharmacist(s) and doctor(s) at your next visit.                  "

## 2018-12-07 NOTE — ED AVS SNAPSHOT
[facility] Emergency Department    [address]    [facility] [address]    Phone:  [phone]    Fax:  [phone]                                       Lorna Kenyon   MRN: [id number]    Department:  [facility] Emergency Department   Date of Visit:  12/7/2018           After Visit Summary Signature Page     I have received my discharge instructions, and my questions have been answered. I have discussed any challenges I see with this plan with the nurse or doctor.    ..........................................................................................................................................  Patient/Patient Representative Signature      ..........................................................................................................................................  Patient Representative Print Name and Relationship to Patient    ..................................................               ................................................  Date                                   Time    ..........................................................................................................................................  Reviewed by Signature/Title    ...................................................              ..............................................  Date                                               Time          22EPIC Rev 08/18

## 2018-12-07 NOTE — ED TRIAGE NOTES
Patient states 2 days of sore throat and pain with swallowing. Denies fever or use of OTC medications. Has not been eat or drinking adequately.

## 2018-12-07 NOTE — ED PROVIDER NOTES
"  History     Chief Complaint:  Pharyngitis    HPI   Lorna Kenyon is a 33 year old diabetic female who presents with her spouse to the emergency department for evaluation of 2 days of sore throat, difficulty swallowing secondary to the \"pulsing\" pain and subjective fevers. Patient became concerned as she endorsed she saw \"white\" at the back of her throat, thus presented for evaluation. Patient took a dose of ibuprofen last night, but did not take anything for her pain today. She denies any cough.     Allergies:  No Known Drug Allergies     Medications:    Tylenol  Ibuprofen  Senna-docusate    Past Medical History:    Diabetes    Past Surgical History:      Laparoscopic cholecystectomy    Family History:    The patient denies any relevant family medical history.    Social History:  The patient was accompanied to the ED by spouse.  Smoking Status: No  Smokeless Tobacco: No  Alcohol Use: No   Marital Status:   [2]     Review of Systems   Constitutional: Positive for fever (Subjective).   HENT: Positive for sore throat and trouble swallowing (secondary to pain).    Respiratory: Negative for cough.    All other systems reviewed and are negative.      Physical Exam   Vitals:  Patient Vitals for the past 24 hrs:   BP Temp Temp src Pulse Resp SpO2   18 0953 104/74 97.7  F (36.5  C) Oral 79 16 99 %      Physical Exam  Constitutional: vitals reviewed  HENT: Moist oral mucosa.  Posterior pharyngeal erythema and tonsillar edema that is symmetric.  Uvula is midline.  Minimal white exudates on the right tonsillar region.  There is tender right sided cervical lymphadenopathy.  TMs clear bilaterally.  Eyes: Grossly normal vision. Pupils are equal and round. Extraocular movements intact.  Sclera clear and without icterus.   Cardiovascular: Normal rate. Regular rhythm.   Respiratory: Effort normal. Clear breath sounds bilaterally.  Gastrointestinal: Soft. No distension. No tenderness to palpation. No rebound " or guarding.  Neurologic: Alert and awake. Coordinated movement of extremities. Speech normal.  Skin: No diaphoresis, rashes, ecchymoses, or lesions.  Musculoskeletal: No lower extremity edema. No gross deformity. No joint swelling.  Psychiatric: Appropriate affect. Behavior is normal. Intact recent and remote memory. Linear thought process.    Emergency Department Course     Laboratory:  Laboratory findings were communicated with the patient and family who voiced understanding of the findings.  Rapid Strep Test: Positive (A)    Interventions:  1023 Ibuprofen 600 mg PO  1104 Penicillin 1.2 Million Units IM     Emergency Department Course:  Nursing notes and vitals reviewed.  The patient's throat was swabbed and this sample was sent for strep evaluation, findings above.     10:15 AM: I performed an exam of the patient as documented above. History obtained from patient.   10:43 AM: Updated patient and spouse regarding results. Discussed plan of care and patient will be discharged home.     I discussed the treatment plan with the patient. They expressed understanding of this plan and consented to discharge. They will be discharged home with instructions for care and follow up. In addition, the patient will return to the emergency department if their symptoms persist, worsen, if new symptoms arise or if there is any concern.  All questions were answered.     I personally reviewed the laboratory results with the Patient and spouse and answered all related questions prior to discharge.    Impression & Plan      Medical Decision Making:  Lorna Kenyon is a 33 year old female who presents for evaluation of a sore throat and clinical evidence of pharyngitis.  The rapid strep test is positive. There is no clinical evidence of peritonsillar abscess, retropharyngeal abscess, Lemierre's Syndrome, epiglottis, or Michele's angina. The patient's symptoms are consistent with streptococcal pharyngitis.  I have recommended treatment  with antibiotics, which she will receive in the ED, and analgesics.  Return if increasing pain, change in voice, neck pain, vomiting, fever, or shortness of breath. Follow-up with primary physician if not improving in 3-5 days.    Diagnosis:    ICD-10-CM    1. Acute streptococcal pharyngitis J02.0    2.  delivery delivered O82 acetaminophen (TYLENOL) 325 MG tablet     ibuprofen (ADVIL/MOTRIN) 400 MG tablet        Disposition:   Discharged.    Discharge Medications:  New Prescriptions    OXYCODONE (ROXICODONE) 5 MG TABLET    Take 1 tablet (5 mg) by mouth every 6 hours as needed for breakthrough pain, moderate to severe pain or severe pain       Scribe Disclosure:  Frances HERNANDEZ, am serving as a scribe at 10:19 AM on 2018 to document services personally performed by Taqueria Bella MD, based on my observations and the provider's statements to me.  2018   [facility] EMERGENCY DEPARTMENT       Taqueria Bella MD  18 [id number]

## 2019-03-14 ENCOUNTER — APPOINTMENT (OUTPATIENT)
Dept: CT IMAGING | Facility: CLINIC | Age: 34
End: 2019-03-14
Attending: EMERGENCY MEDICINE
Payer: COMMERCIAL

## 2019-03-14 ENCOUNTER — HOSPITAL ENCOUNTER (EMERGENCY)
Facility: CLINIC | Age: 34
Discharge: HOME | End: 2019-03-14
Attending: EMERGENCY MEDICINE | Admitting: EMERGENCY MEDICINE
Payer: COMMERCIAL

## 2019-03-14 VITALS
RESPIRATION RATE: 18 BRPM | DIASTOLIC BLOOD PRESSURE: 82 MMHG | OXYGEN SATURATION: 99 % | SYSTOLIC BLOOD PRESSURE: 106 MMHG | TEMPERATURE: 98.1 F

## 2019-03-14 DIAGNOSIS — R10.30 LOWER ABDOMINAL PAIN: ICD-10-CM

## 2019-03-14 LAB
ALBUMIN SERPL-MCNC: 3.9 G/DL (ref 3.4–5)
ALBUMIN UR-MCNC: NEGATIVE MG/DL
ALP SERPL-CCNC: 84 U/L (ref 40–150)
ALT SERPL W P-5'-P-CCNC: 21 U/L (ref 0–50)
ANION GAP SERPL CALCULATED.3IONS-SCNC: 4 MMOL/L (ref 3–14)
APPEARANCE UR: ABNORMAL
AST SERPL W P-5'-P-CCNC: 18 U/L (ref 0–45)
BACTERIA #/AREA URNS HPF: ABNORMAL /HPF
BASOPHILS # BLD AUTO: 0 10E9/L (ref 0–0.2)
BASOPHILS NFR BLD AUTO: 0.3 %
BILIRUB SERPL-MCNC: 0.5 MG/DL (ref 0.2–1.3)
BILIRUB UR QL STRIP: NEGATIVE
BUN SERPL-MCNC: 12 MG/DL (ref 7–30)
CALCIUM SERPL-MCNC: 8.7 MG/DL (ref 8.5–10.1)
CHLORIDE SERPL-SCNC: 106 MMOL/L (ref 94–109)
CO2 SERPL-SCNC: 30 MMOL/L (ref 20–32)
COLOR UR AUTO: YELLOW
CREAT SERPL-MCNC: 0.6 MG/DL (ref 0.52–1.04)
DIFFERENTIAL METHOD BLD: NORMAL
EOSINOPHIL # BLD AUTO: 0.1 10E9/L (ref 0–0.7)
EOSINOPHIL NFR BLD AUTO: 2.1 %
ERYTHROCYTE [DISTWIDTH] IN BLOOD BY AUTOMATED COUNT: 11.9 % (ref 10–15)
GFR SERPL CREATININE-BSD FRML MDRD: >90 ML/MIN/{1.73_M2}
GLUCOSE SERPL-MCNC: 81 MG/DL (ref 70–99)
GLUCOSE UR STRIP-MCNC: NEGATIVE MG/DL
HCG UR QL: NEGATIVE
HCT VFR BLD AUTO: 39.9 % (ref 35–47)
HGB BLD-MCNC: 13.2 G/DL (ref 11.7–15.7)
HGB UR QL STRIP: NEGATIVE
IMM GRANULOCYTES # BLD: 0 10E9/L (ref 0–0.4)
IMM GRANULOCYTES NFR BLD: 0.3 %
KETONES UR STRIP-MCNC: NEGATIVE MG/DL
LEUKOCYTE ESTERASE UR QL STRIP: NEGATIVE
LIPASE SERPL-CCNC: 61 U/L (ref 73–393)
LYMPHOCYTES # BLD AUTO: 1.7 10E9/L (ref 0.8–5.3)
LYMPHOCYTES NFR BLD AUTO: 25.6 %
MCH RBC QN AUTO: 30.6 PG (ref 26.5–33)
MCHC RBC AUTO-ENTMCNC: 33.1 G/DL (ref 31.5–36.5)
MCV RBC AUTO: 93 FL (ref 78–100)
MONOCYTES # BLD AUTO: 0.3 10E9/L (ref 0–1.3)
MONOCYTES NFR BLD AUTO: 4.6 %
MUCOUS THREADS #/AREA URNS LPF: PRESENT /LPF
NEUTROPHILS # BLD AUTO: 4.5 10E9/L (ref 1.6–8.3)
NEUTROPHILS NFR BLD AUTO: 67.1 %
NITRATE UR QL: NEGATIVE
NRBC # BLD AUTO: 0 10*3/UL
NRBC BLD AUTO-RTO: 0 /100
PH UR STRIP: 5 PH (ref 5–7)
PLATELET # BLD AUTO: 240 10E9/L (ref 150–450)
POTASSIUM SERPL-SCNC: 4 MMOL/L (ref 3.4–5.3)
PROT SERPL-MCNC: 7.9 G/DL (ref 6.8–8.8)
RBC # BLD AUTO: 4.31 10E12/L (ref 3.8–5.2)
RBC #/AREA URNS AUTO: <1 /HPF (ref 0–2)
SODIUM SERPL-SCNC: 140 MMOL/L (ref 133–144)
SOURCE: ABNORMAL
SP GR UR STRIP: 1.02 (ref 1–1.03)
SQUAMOUS #/AREA URNS AUTO: 2 /HPF (ref 0–1)
UROBILINOGEN UR STRIP-MCNC: 0 MG/DL (ref 0–2)
WBC # BLD AUTO: 6.7 10E9/L (ref 4–11)
WBC #/AREA URNS AUTO: 1 /HPF (ref 0–5)

## 2019-03-14 PROCEDURE — 96360 HYDRATION IV INFUSION INIT: CPT

## 2019-03-14 PROCEDURE — 80053 COMPREHEN METABOLIC PANEL: CPT | Performed by: EMERGENCY MEDICINE

## 2019-03-14 PROCEDURE — 25000128 H RX IP 250 OP 636: Performed by: EMERGENCY MEDICINE

## 2019-03-14 PROCEDURE — 85025 COMPLETE CBC W/AUTO DIFF WBC: CPT | Performed by: EMERGENCY MEDICINE

## 2019-03-14 PROCEDURE — 81001 URINALYSIS AUTO W/SCOPE: CPT | Performed by: EMERGENCY MEDICINE

## 2019-03-14 PROCEDURE — 81025 URINE PREGNANCY TEST: CPT | Performed by: EMERGENCY MEDICINE

## 2019-03-14 PROCEDURE — 25000132 ZZH RX MED GY IP 250 OP 250 PS 637: Performed by: EMERGENCY MEDICINE

## 2019-03-14 PROCEDURE — 83690 ASSAY OF LIPASE: CPT | Performed by: EMERGENCY MEDICINE

## 2019-03-14 PROCEDURE — 99285 EMERGENCY DEPT VISIT HI MDM: CPT | Mod: 25

## 2019-03-14 PROCEDURE — 74177 CT ABD & PELVIS W/CONTRAST: CPT

## 2019-03-14 RX ORDER — IOPAMIDOL 755 MG/ML
500 INJECTION, SOLUTION INTRAVASCULAR ONCE
Status: COMPLETED | OUTPATIENT
Start: 2019-03-14 | End: 2019-03-14

## 2019-03-14 RX ORDER — OXYCODONE HYDROCHLORIDE 5 MG/1
5 TABLET ORAL ONCE
Status: COMPLETED | OUTPATIENT
Start: 2019-03-14 | End: 2019-03-14

## 2019-03-14 RX ADMIN — OXYCODONE HYDROCHLORIDE 5 MG: 5 TABLET ORAL at 16:14

## 2019-03-14 RX ADMIN — IOPAMIDOL 81 ML: 755 INJECTION, SOLUTION INTRAVENOUS at 14:56

## 2019-03-14 RX ADMIN — SODIUM CHLORIDE 60 ML: 9 INJECTION, SOLUTION INTRAVENOUS at 14:56

## 2019-03-14 ASSESSMENT — ENCOUNTER SYMPTOMS: ABDOMINAL PAIN: 1
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
## 2019-03-14 NOTE — ED AVS SNAPSHOT
[facility] Emergency Department  [address]  [facility] [address]  Phone:  [phone]  Fax:  [phone]                                    Lorna Kenyon   MRN: [id number]    Department:  [facility] Emergency Department   Date of Visit:  3/14/2019           After Visit Summary Signature Page    I have received my discharge instructions, and my questions have been answered. I have discussed any challenges I see with this plan with the nurse or doctor.    ..........................................................................................................................................  Patient/Patient Representative Signature      ..........................................................................................................................................  Patient Representative Print Name and Relationship to Patient    ..................................................               ................................................  Date                                   Time    ..........................................................................................................................................  Reviewed by Signature/Title    ...................................................              ..............................................  Date                                               Time          22EPIC Rev 08/18

## 2019-03-22 ASSESSMENT — ENCOUNTER SYMPTOMS
DYSURIA: 0
FEVER: 0

## 2019-04-28 ENCOUNTER — APPOINTMENT (OUTPATIENT)
Dept: GENERAL RADIOLOGY | Facility: CLINIC | Age: 34
End: 2019-04-28
Attending: PHYSICIAN ASSISTANT
Payer: COMMERCIAL

## 2019-04-28 ENCOUNTER — HOSPITAL ENCOUNTER (EMERGENCY)
Facility: CLINIC | Age: 34
Discharge: HOME | End: 2019-04-28
Attending: PHYSICIAN ASSISTANT | Admitting: PHYSICIAN ASSISTANT
Payer: COMMERCIAL

## 2019-04-28 VITALS
HEART RATE: 74 BPM | DIASTOLIC BLOOD PRESSURE: 86 MMHG | RESPIRATION RATE: 16 BRPM | OXYGEN SATURATION: 100 % | TEMPERATURE: 98.3 F | SYSTOLIC BLOOD PRESSURE: 113 MMHG

## 2019-04-28 DIAGNOSIS — M25.562 ACUTE PAIN OF LEFT KNEE: ICD-10-CM

## 2019-04-28 DIAGNOSIS — S80.212A KNEE ABRASION, LEFT, INITIAL ENCOUNTER: ICD-10-CM

## 2019-04-28 PROCEDURE — 73562 X-RAY EXAM OF KNEE 3: CPT | Mod: LT

## 2019-04-28 PROCEDURE — 25000132 ZZH RX MED GY IP 250 OP 250 PS 637: Performed by: PHYSICIAN ASSISTANT

## 2019-04-28 PROCEDURE — 99283 EMERGENCY DEPT VISIT LOW MDM: CPT

## 2019-04-28 RX ORDER — IBUPROFEN 600 MG/1
600 TABLET, FILM COATED ORAL ONCE
Status: COMPLETED | OUTPATIENT
Start: 2019-04-28 | End: 2019-04-28

## 2019-04-28 RX ADMIN — IBUPROFEN 600 MG: 600 TABLET ORAL at 22:23

## 2019-04-28 ASSESSMENT — ENCOUNTER SYMPTOMS
MYALGIAS: 1
VOMITING: 0
WOUND: 1
NAUSEA: 0
NUMBNESS: 0

## 2019-04-28 NOTE — ED AVS SNAPSHOT
[facility] Emergency Department  [address]  [facility] [address]  Phone:  [phone]  Fax:  [phone]                                    Lorna Kenyon   MRN: [id number]    Department:  [facility] Emergency Department   Date of Visit:  4/28/2019           After Visit Summary Signature Page    I have received my discharge instructions, and my questions have been answered. I have discussed any challenges I see with this plan with the nurse or doctor.    ..........................................................................................................................................  Patient/Patient Representative Signature      ..........................................................................................................................................  Patient Representative Print Name and Relationship to Patient    ..................................................               ................................................  Date                                   Time    ..........................................................................................................................................  Reviewed by Signature/Title    ...................................................              ..............................................  Date                                               Time          22EPIC Rev 08/18

## 2019-04-29 NOTE — ED PROVIDER NOTES
History     Chief Complaint:  Knee Injury     HPI   HPI limited secondary to language barrier. HPI provided through interpretation from patient's .      Lorna Kenyon is a 34 year old female who presents with her  to the ED for evaluation of left knee injury. The patient reports she tripped on the chair corner and fell onto her knees, approximately 15 minutes prior to arrival. She has been unable to bear weight and ambulate. She has an abrasion on the knee. The patient notes she has associated nausea and vomiting due to the pain. The patient denies any head trauma, other injuries, numbness, or tingling.      Allergies:  No known drug allergies     Medications:    The patient is not currently taking any prescribed medications.    Past Medical History:    Gestational diabetes       Past Surgical History:      Cholecystectomy    Family History:    History reviewed. No pertinent family history.     Social History:  Smoking status: Never smoker    Alcohol use: No  Presents to ED with     Marital Status:   [2]     Review of Systems   Gastrointestinal: Negative for nausea and vomiting.   Musculoskeletal: Positive for myalgias.   Skin: Positive for wound.   Neurological: Negative for numbness.   All other systems reviewed and are negative.    Physical Exam     Patient Vitals for the past 24 hrs:   BP Temp Temp src Pulse Resp SpO2   19 2210 113/86 98.3  F (36.8  C) Temporal 74 16 100 %     Physical Exam  Constitutional: well appearing, no distress.   Head: No external signs of trauma noted to head or face.   Neck: Normal ROM.  Cardiovascular: Normal rate, regular rhythm, and intact distal pulses.    Respiratory: Effort normal. No respiratory distress. Lungs clear to auscultation bilaterally.   Musculoskeletal: No deformities appreciated. Normal ROM. No edema noted.  Left Knee: abrasion over anterior knee. No swelling, effusion, ecchymosis, or deformity. Tender to palpation  diffusely over knee. ROM limited due to pain. No joint laxity to valgus or varus stress. Negative lachmans and anterior drawer. The remainder of the LLE is non-tender with normal ROM.   Neurological: Alert and Oriented x 3. Speech normal. Moves all extremities equally. Normal strength and sensation in upper and lower extremities.   Psychiatric: Appropriate mood, affect, and behavior.   Skin: Skin is warm and dry. abrasion to left knee.     Emergency Department Course     Imaging:  Radiographic findings were communicated with the patient and family who voiced understanding of the findings.    XR Knee Left 3 Views  No acute fracture or dislocation. Minimal degenerative change at the patellofemoral joint space and medial compartment.    Interventions:  2223: Ibuprofen 600mg PO    Emergency Department Course:  Past medical records, nursing notes, and vitals reviewed.  2215: I performed an exam of the patient and obtained history, as documented above.    The patient was sent for a left knee x-ray while in the emergency department, findings above.    I rechecked the patient. Explained findings to patient and .    Findings and plan explained to the Patient and spouse. Patient discharged home with instructions regarding supportive care, medications, and reasons to return. The importance of close follow-up was reviewed.     Impression & Plan      Medical Decision Makin year old female presenting with left knee pain after a fall. X-ray is negative for fracture, dislocation, or malalignment. No significant joint laxity on exam. She is neurovascularly intact. Pain is likely secondary to contusion and abrasion. Recommended tylenol, ibuprofen, rest, and ice. Follow-up with primary care provider in 1 week if not improving. Instructed to return to the ED for any new or worsening symptoms.     Diagnosis:    ICD-10-CM    1. Acute pain of left knee M25.562    2. Knee abrasion, left, initial encounter S80.212A         Disposition: Patient discharged to home with         Chantal Gonzalez  4/28/2019   [facility] EMERGENCY DEPARTMENT    Scribe Disclosure:  I, Chantal Gonzalez, am serving as a scribe at 10:15 PM on 4/28/2019 to document services personally performed by Milvia Finney PA-C based on my observations and the provider's statements to me.        Milvia Finney PA-C  04/28/19 5391

## 2019-07-30 ENCOUNTER — APPOINTMENT (OUTPATIENT)
Dept: CT IMAGING | Facility: CLINIC | Age: 34
End: 2019-07-30
Attending: EMERGENCY MEDICINE
Payer: COMMERCIAL

## 2019-07-30 ENCOUNTER — HOSPITAL ENCOUNTER (EMERGENCY)
Facility: CLINIC | Age: 34
Discharge: HOME | End: 2019-07-30
Attending: EMERGENCY MEDICINE | Admitting: EMERGENCY MEDICINE
Payer: COMMERCIAL

## 2019-07-30 VITALS
OXYGEN SATURATION: 100 % | SYSTOLIC BLOOD PRESSURE: 94 MMHG | HEART RATE: 76 BPM | TEMPERATURE: 97.9 F | DIASTOLIC BLOOD PRESSURE: 69 MMHG | RESPIRATION RATE: 13 BRPM

## 2019-07-30 DIAGNOSIS — R10.30 LOWER ABDOMINAL PAIN: ICD-10-CM

## 2019-07-30 DIAGNOSIS — R50.9 FEVER, UNSPECIFIED: ICD-10-CM

## 2019-07-30 LAB
ALBUMIN UR-MCNC: NEGATIVE MG/DL
ANION GAP SERPL CALCULATED.3IONS-SCNC: 7 MMOL/L (ref 3–14)
APPEARANCE UR: CLEAR
B-HCG FREE SERPL-ACNC: <5 IU/L
BASOPHILS # BLD AUTO: 0 10E9/L (ref 0–0.2)
BASOPHILS NFR BLD AUTO: 0.3 %
BILIRUB UR QL STRIP: NEGATIVE
BUN SERPL-MCNC: 8 MG/DL (ref 7–30)
CALCIUM SERPL-MCNC: 8.8 MG/DL (ref 8.5–10.1)
CHLORIDE SERPL-SCNC: 109 MMOL/L (ref 94–109)
CO2 SERPL-SCNC: 23 MMOL/L (ref 20–32)
COLOR UR AUTO: ABNORMAL
CREAT SERPL-MCNC: 0.55 MG/DL (ref 0.52–1.04)
DEPRECATED S PYO AG THROAT QL EIA: NORMAL
DIFFERENTIAL METHOD BLD: ABNORMAL
EOSINOPHIL # BLD AUTO: 0 10E9/L (ref 0–0.7)
EOSINOPHIL NFR BLD AUTO: 0 %
ERYTHROCYTE [DISTWIDTH] IN BLOOD BY AUTOMATED COUNT: 12.2 % (ref 10–15)
GFR SERPL CREATININE-BSD FRML MDRD: >90 ML/MIN/{1.73_M2}
GLUCOSE SERPL-MCNC: 153 MG/DL (ref 70–99)
GLUCOSE UR STRIP-MCNC: NEGATIVE MG/DL
HCT VFR BLD AUTO: 39.8 % (ref 35–47)
HGB BLD-MCNC: 13.3 G/DL (ref 11.7–15.7)
HGB UR QL STRIP: NEGATIVE
IMM GRANULOCYTES # BLD: 0 10E9/L (ref 0–0.4)
IMM GRANULOCYTES NFR BLD: 0.3 %
KETONES UR STRIP-MCNC: NEGATIVE MG/DL
LEUKOCYTE ESTERASE UR QL STRIP: ABNORMAL
LYMPHOCYTES # BLD AUTO: 0.7 10E9/L (ref 0.8–5.3)
LYMPHOCYTES NFR BLD AUTO: 10.6 %
MCH RBC QN AUTO: 30 PG (ref 26.5–33)
MCHC RBC AUTO-ENTMCNC: 33.4 G/DL (ref 31.5–36.5)
MCV RBC AUTO: 90 FL (ref 78–100)
MONOCYTES # BLD AUTO: 0.5 10E9/L (ref 0–1.3)
MONOCYTES NFR BLD AUTO: 7.7 %
NEUTROPHILS # BLD AUTO: 5.7 10E9/L (ref 1.6–8.3)
NEUTROPHILS NFR BLD AUTO: 81.1 %
NITRATE UR QL: NEGATIVE
NRBC # BLD AUTO: 0 10*3/UL
NRBC BLD AUTO-RTO: 0 /100
PH UR STRIP: 6 PH (ref 5–7)
PLATELET # BLD AUTO: 170 10E9/L (ref 150–450)
POTASSIUM SERPL-SCNC: 3.7 MMOL/L (ref 3.4–5.3)
RBC # BLD AUTO: 4.43 10E12/L (ref 3.8–5.2)
RBC #/AREA URNS AUTO: 1 /HPF (ref 0–2)
SODIUM SERPL-SCNC: 139 MMOL/L (ref 133–144)
SOURCE: ABNORMAL
SP GR UR STRIP: 1.01 (ref 1–1.03)
SPECIMEN SOURCE: NORMAL
SQUAMOUS #/AREA URNS AUTO: 3 /HPF (ref 0–1)
UROBILINOGEN UR STRIP-MCNC: NORMAL MG/DL (ref 0–2)
WBC # BLD AUTO: 7 10E9/L (ref 4–11)
WBC #/AREA URNS AUTO: 5 /HPF (ref 0–5)

## 2019-07-30 PROCEDURE — 25000132 ZZH RX MED GY IP 250 OP 250 PS 637: Performed by: EMERGENCY MEDICINE

## 2019-07-30 PROCEDURE — 87077 CULTURE AEROBIC IDENTIFY: CPT | Performed by: EMERGENCY MEDICINE

## 2019-07-30 PROCEDURE — 36415 COLL VENOUS BLD VENIPUNCTURE: CPT | Performed by: EMERGENCY MEDICINE

## 2019-07-30 PROCEDURE — 87186 SC STD MICRODIL/AGAR DIL: CPT | Performed by: EMERGENCY MEDICINE

## 2019-07-30 PROCEDURE — 81001 URINALYSIS AUTO W/SCOPE: CPT | Performed by: EMERGENCY MEDICINE

## 2019-07-30 PROCEDURE — 84702 CHORIONIC GONADOTROPIN TEST: CPT

## 2019-07-30 PROCEDURE — 87880 STREP A ASSAY W/OPTIC: CPT | Performed by: EMERGENCY MEDICINE

## 2019-07-30 PROCEDURE — 25000128 H RX IP 250 OP 636: Performed by: EMERGENCY MEDICINE

## 2019-07-30 PROCEDURE — 87081 CULTURE SCREEN ONLY: CPT | Performed by: EMERGENCY MEDICINE

## 2019-07-30 PROCEDURE — 96361 HYDRATE IV INFUSION ADD-ON: CPT

## 2019-07-30 PROCEDURE — 99285 EMERGENCY DEPT VISIT HI MDM: CPT | Mod: 25

## 2019-07-30 PROCEDURE — 96360 HYDRATION IV INFUSION INIT: CPT

## 2019-07-30 PROCEDURE — 87040 BLOOD CULTURE FOR BACTERIA: CPT | Performed by: EMERGENCY MEDICINE

## 2019-07-30 PROCEDURE — 74177 CT ABD & PELVIS W/CONTRAST: CPT

## 2019-07-30 PROCEDURE — 85025 COMPLETE CBC W/AUTO DIFF WBC: CPT | Performed by: EMERGENCY MEDICINE

## 2019-07-30 PROCEDURE — 25800030 ZZH RX IP 258 OP 636: Performed by: EMERGENCY MEDICINE

## 2019-07-30 PROCEDURE — 87800 DETECT AGNT MULT DNA DIREC: CPT | Performed by: EMERGENCY MEDICINE

## 2019-07-30 PROCEDURE — 80048 BASIC METABOLIC PNL TOTAL CA: CPT | Performed by: EMERGENCY MEDICINE

## 2019-07-30 RX ORDER — IBUPROFEN 600 MG/1
600 TABLET, FILM COATED ORAL EVERY 6 HOURS PRN
Qty: 20 TABLET | Refills: 1 | Status: ON HOLD | OUTPATIENT
Start: 2019-07-30 | End: 2023-10-22

## 2019-07-30 RX ORDER — ACETAMINOPHEN 500 MG
500 TABLET ORAL EVERY 6 HOURS PRN
Qty: 60 TABLET | Refills: 0 | Status: SHIPPED | OUTPATIENT
Start: 2019-07-30 | End: 2019-08-06

## 2019-07-30 RX ORDER — IBUPROFEN 600 MG/1
600 TABLET, FILM COATED ORAL ONCE
Status: COMPLETED | OUTPATIENT
Start: 2019-07-30 | End: 2019-07-30

## 2019-07-30 RX ORDER — IOPAMIDOL 755 MG/ML
500 INJECTION, SOLUTION INTRAVASCULAR ONCE
Status: COMPLETED | OUTPATIENT
Start: 2019-07-30 | End: 2019-07-30

## 2019-07-30 RX ADMIN — IBUPROFEN 600 MG: 600 TABLET ORAL at 07:28

## 2019-07-30 RX ADMIN — SODIUM CHLORIDE, POTASSIUM CHLORIDE, SODIUM LACTATE AND CALCIUM CHLORIDE 1000 ML: 600; 310; 30; 20 INJECTION, SOLUTION INTRAVENOUS at 12:51

## 2019-07-30 RX ADMIN — SODIUM CHLORIDE 61 ML: 9 INJECTION, SOLUTION INTRAVENOUS at 10:58

## 2019-07-30 RX ADMIN — IOPAMIDOL 81 ML: 755 INJECTION, SOLUTION INTRAVENOUS at 10:58

## 2019-07-30 RX ADMIN — SODIUM CHLORIDE 1000 ML: 9 INJECTION, SOLUTION INTRAVENOUS at 08:42

## 2019-07-30 ASSESSMENT — ENCOUNTER SYMPTOMS
NAUSEA: 0
FEVER: 1
VOMITING: 0
TROUBLE SWALLOWING: 1
DYSURIA: 0
DIARRHEA: 0
ABDOMINAL PAIN: 1

## 2019-07-30 NOTE — ED PROVIDER NOTES
History     Chief Complaint:  Fever    The history is provided by the patient and the spouse. The history is limited by a language barrier. No  was used ( acted in the role of ).      Lorna Kenyon is a 34 year old, primarily Ethiopian-speaking female who presents for evaluation of a fever and myalgias. She reports an onset of symptoms last night. Prior to arrival, she took some medications at midnight, but nothing afterwards. Here, she also notes lower abdominal pain and some pain with swallowing. She denies any upper abdominal pain, diarrhea, vomiting, or dysuria. She also denies any recent travel or known sick contacts.     Allergies:  NKDA    Medications:    Micronor contraceptive  Multivitamin  Zantac     Past Medical History:    Gestational diabetes  Complex ovarian cyst  GERD    Past Surgical History:      Cholecystectomy     Family History:    No past pertinent family history.    Social History:  Marital Status:   [2]   at bedside.  Negative for tobacco use.  Negative for alcohol use.  Negative for drug use.      Review of Systems   Constitutional: Positive for fever.   HENT: Positive for trouble swallowing.    Gastrointestinal: Positive for abdominal pain. Negative for diarrhea, nausea and vomiting.   Genitourinary: Negative for dysuria.   All other systems reviewed and are negative.      Physical Exam     Patient Vitals for the past 24 hrs:   BP Temp Temp src Pulse Heart Rate Resp SpO2   19 1200 94/69 -- -- 76 -- -- 100 %   19 1145 94/64 -- -- 75 -- -- 100 %   19 1142 105/70 -- -- -- 76 13 100 %   19 1137 101/69 97.9  F (36.6  C) -- -- -- -- 97 %   19 0724 119/72 102.4  F (39.1  C) Oral -- 111 20 99 %      Physical Exam  Vital signs and nursing notes reviewed.     Constitutional: laying on gurney appears comfortable  HENT: Oropharynx is clear and moist  Eyes: Conjunctivae are normal bilaterally. Pupils equal  Neck:  normal range of motion  Cardiovascular: tachycardic rate, regular rhythm, normal heart sounds.   Pulmonary/Chest: Effort normal and breath sounds normal. No respiratory distress.   Abdominal: Soft. Bowel sounds are normal. Mild lower abdominal pain. No rebound or guarding.   Pelvic exam: patient denies exam  Musculoskeletal: No joint swelling or edema.   Neurological: Alert and oriented. No focal weakness  Skin: Skin is warm and dry. No rash noted.   Psych: normal affect    Emergency Department Course   Imaging:  Radiographic findings were communicated with the patient and spouse who voiced understanding of the findings.  CT Abdomen/Pelvis with IV contrast:   1. Small amount of free fluid in the pelvis is nonspecific, and may be  physiologic.  2. No definite cause for abdominal pain is identified. No bowel  Obstruction, as per radiology.    Laboratory:  Rapid strep: Negative   Beta strep group A culture: Pending    CBC: WBC: 7.0, HGB: 13.3, PLT: 170  BMP: Glucose 153 (H), o/w WNL (Creatinine: 0.55)  ISTAT HCG: <5.0  Blood cultures x2: Pending    UA with Microscopic: Leukocyte esterase: Trace (A), Squamous epithelial: 3 (H), o/w WNL     Interventions:  0728 Ibuprofen, 600 mg, PO  0842 NS 1L IV   1251 LR 1L, IV    Emergency Department Course:  Nursing notes and vitals reviewed.     Medicine administered as documented above.     0815: I performed an exam of the patient as documented above.     The patient's throat was swabbed and this sample was sent for rapid strep screen, findings above.     IV was inserted and blood was drawn for laboratory testing, results above.    The patient provided a urine sample here in the emergency department. This was sent for laboratory testing, findings above.     The patient was sent for a CT abdomen/pelvis while in the emergency department, results above.     1345: I rechecked the patient and discussed the results of her workup thus far.     1410: I discussed discharge instructions.      I personally reviewed the laboratory and imaging results with the Patient and answered all related questions prior to discharge. She was prescribed ibuprofen and Tylenol.       Impression & Plan      Medical Decision Making:  Lorna Kenyon is a 34 year old female who presents with fever and body ache symptoms. She also had complaints of mild lower abdominal pain. On examination, she really had no definable source of infection with HENT exam, but did have some mild lower abdominal pain. Her lab tests were reassuring. She has no definable source of infection. CT imaging was obtained which showed no concerning findings in the lower abdomen. I did discuss with her at length, with the , about the findings and also my recommendation to do a pelvic exam to make sure she did not have any pelvic inflammatory disease which could be a suggestive cause of her fever, however patient is adamant that she has had these tests done recently and that she has no risk factors for PID, so therefore does not want the studies done. I discussed with her at length that the exact source of her fever is unclear and, therefore, she needs close follow-up within 24 hours if symptoms persist and to return to the ED if worsening. Recheck of vitals are normal, she appears well, and has no complaints, therefore she is safe for discharge home.     Diagnosis:    ICD-10-CM    1. Fever, unspecified R50.9    2. Lower abdominal pain R10.30        Disposition:  discharged to home    Discharge Medications:     Medication List      Modified    acetaminophen 500 MG tablet  Commonly known as:  TYLENOL  500 mg, Oral, EVERY 6 HOURS PRN  What changed:      medication strength    how much to take    when to take this    reasons to take this     ibuprofen 600 MG tablet  Commonly known as:  ADVIL/MOTRIN  600 mg, Oral, EVERY 6 HOURS PRN  What changed:      medication strength    how much to take    reasons to take this       Scribe Disclosure:  I,  Nancie Mattson, am serving as a scribe on 7/30/2019 at 9:05 AM to personally document services performed by Jack Hayes MD based on my observations and the provider's statements to me.     Nancie Mattson  7/30/2019   [facility] EMERGENCY DEPARTMENT       Jack Hayes MD  07/30/19 1539

## 2019-07-30 NOTE — ED TRIAGE NOTES
Fever and body aches and chills since last night.  Patient alert and oriented x3.  Airway, breathing and circulation intact.

## 2019-07-30 NOTE — ED AVS SNAPSHOT
[facility] Emergency Department  [address]  [facility] [address]  Phone:  [phone]  Fax:  [phone]                                    Lorna Kenyon   MRN: [id number]    Department:  [facility] Emergency Department   Date of Visit:  7/30/2019           After Visit Summary Signature Page    I have received my discharge instructions, and my questions have been answered. I have discussed any challenges I see with this plan with the nurse or doctor.    ..........................................................................................................................................  Patient/Patient Representative Signature      ..........................................................................................................................................  Patient Representative Print Name and Relationship to Patient    ..................................................               ................................................  Date                                   Time    ..........................................................................................................................................  Reviewed by Signature/Title    ...................................................              ..............................................  Date                                               Time          22EPIC Rev 08/18

## 2019-08-01 LAB
BACTERIA SPEC CULT: NORMAL
Lab: NORMAL
SPECIMEN SOURCE: NORMAL

## 2019-08-01 NOTE — ED PROVIDER NOTES
I was alerted to a single positive blood culture from the left arm growing Gram positive cocci in clusters after one day. Right arm culture remains no growth. Patient was contacted but phone when directly to voicemail. I left a msg asking the patient to call the ER or return for reassessment if she has ongoing fevers/symptoms.     MD Nataly Lopez Tracy Dianne, MD  08/01/19 3314      I was contacted again with result of staph epidermidis 1/2, which is most likely contaminant. Patient has not called back to or returned to ED.     MD Nataly Lopez Tracy Dianne, MD  08/01/19 3917

## 2019-08-01 NOTE — RESULT ENCOUNTER NOTE
Final Beta strep group A r/o culture is NEGATIVE for Group A streptococcus.    No treatment or change in treatment per [place] Strep protocol.

## 2019-08-01 NOTE — RESULT ENCOUNTER NOTE
ED Provider notes copied and pasted below:    I was alerted to a single positive blood culture from the left arm growing Gram positive cocci in clusters after one day. Right arm culture remains no growth. Patient was contacted but phone when directly to voicemail. I left a msg asking the patient to call the ER or return for reassessment if she has ongoing fevers/symptoms.   Deborah Ingram MD  08/01/19 5128     I was contacted again with result of staph epidermidis 1/2, which is most likely contaminant. Patient has not called back to or returned to ED.   Deborah Ingram MD  08/01/19 5254

## 2019-08-03 LAB
BACTERIA SPEC CULT: ABNORMAL
Lab: ABNORMAL
SPECIMEN SOURCE: ABNORMAL

## 2019-08-05 LAB
BACTERIA SPEC CULT: NO GROWTH
Lab: NORMAL
SPECIMEN SOURCE: NORMAL

## 2019-12-05 ENCOUNTER — HOSPITAL ENCOUNTER (EMERGENCY)
Facility: CLINIC | Age: 34
Discharge: HOME | End: 2019-12-05
Attending: EMERGENCY MEDICINE | Admitting: EMERGENCY MEDICINE
Payer: COMMERCIAL

## 2019-12-05 ENCOUNTER — APPOINTMENT (OUTPATIENT)
Dept: GENERAL RADIOLOGY | Facility: CLINIC | Age: 34
End: 2019-12-05
Attending: EMERGENCY MEDICINE
Payer: COMMERCIAL

## 2019-12-05 VITALS
DIASTOLIC BLOOD PRESSURE: 74 MMHG | TEMPERATURE: 98.2 F | HEART RATE: 76 BPM | OXYGEN SATURATION: 98 % | SYSTOLIC BLOOD PRESSURE: 120 MMHG | RESPIRATION RATE: 18 BRPM

## 2019-12-05 DIAGNOSIS — R07.9 CHEST PAIN, UNSPECIFIED TYPE: ICD-10-CM

## 2019-12-05 DIAGNOSIS — R55 SYNCOPE, UNSPECIFIED SYNCOPE TYPE: ICD-10-CM

## 2019-12-05 LAB
ALBUMIN SERPL-MCNC: 4 G/DL (ref 3.4–5)
ALP SERPL-CCNC: 82 U/L (ref 40–150)
ALT SERPL W P-5'-P-CCNC: 19 U/L (ref 0–50)
ANION GAP SERPL CALCULATED.3IONS-SCNC: 4 MMOL/L (ref 3–14)
AST SERPL W P-5'-P-CCNC: 42 U/L (ref 0–45)
B-HCG FREE SERPL-ACNC: <5 IU/L
BASOPHILS # BLD AUTO: 0 10E9/L (ref 0–0.2)
BASOPHILS NFR BLD AUTO: 0.6 %
BILIRUB DIRECT SERPL-MCNC: <0.1 MG/DL (ref 0–0.2)
BILIRUB SERPL-MCNC: 0.3 MG/DL (ref 0.2–1.3)
BUN SERPL-MCNC: 11 MG/DL (ref 7–30)
CALCIUM SERPL-MCNC: 9.5 MG/DL (ref 8.5–10.1)
CHLORIDE SERPL-SCNC: 107 MMOL/L (ref 94–109)
CO2 SERPL-SCNC: 28 MMOL/L (ref 20–32)
CREAT SERPL-MCNC: 0.54 MG/DL (ref 0.52–1.04)
D DIMER PPP FEU-MCNC: 0.3 UG/ML FEU (ref 0–0.5)
DIFFERENTIAL METHOD BLD: ABNORMAL
EOSINOPHIL # BLD AUTO: 0.1 10E9/L (ref 0–0.7)
EOSINOPHIL NFR BLD AUTO: 2.9 %
ERYTHROCYTE [DISTWIDTH] IN BLOOD BY AUTOMATED COUNT: 12 % (ref 10–15)
GFR SERPL CREATININE-BSD FRML MDRD: >90 ML/MIN/{1.73_M2}
GLUCOSE BLDC GLUCOMTR-MCNC: 101 MG/DL (ref 70–99)
GLUCOSE SERPL-MCNC: 106 MG/DL (ref 70–99)
HCT VFR BLD AUTO: 44.4 % (ref 35–47)
HGB BLD-MCNC: 14.5 G/DL (ref 11.7–15.7)
IMM GRANULOCYTES # BLD: 0 10E9/L (ref 0–0.4)
IMM GRANULOCYTES NFR BLD: 0.2 %
LYMPHOCYTES # BLD AUTO: 2.1 10E9/L (ref 0.8–5.3)
LYMPHOCYTES NFR BLD AUTO: 43.2 %
MCH RBC QN AUTO: 29.5 PG (ref 26.5–33)
MCHC RBC AUTO-ENTMCNC: 32.7 G/DL (ref 31.5–36.5)
MCV RBC AUTO: 90 FL (ref 78–100)
MONOCYTES # BLD AUTO: 0.5 10E9/L (ref 0–1.3)
MONOCYTES NFR BLD AUTO: 9.6 %
NEUTROPHILS # BLD AUTO: 2.1 10E9/L (ref 1.6–8.3)
NEUTROPHILS NFR BLD AUTO: 43.5 %
NRBC # BLD AUTO: 0 10*3/UL
NRBC BLD AUTO-RTO: 0 /100
PLATELET # BLD AUTO: 110 10E9/L (ref 150–450)
POTASSIUM SERPL-SCNC: 4 MMOL/L (ref 3.4–5.3)
PROT SERPL-MCNC: 8.2 G/DL (ref 6.8–8.8)
RBC # BLD AUTO: 4.91 10E12/L (ref 3.8–5.2)
SODIUM SERPL-SCNC: 139 MMOL/L (ref 133–144)
TROPONIN I SERPL-MCNC: <0.015 UG/L (ref 0–0.04)
WBC # BLD AUTO: 4.9 10E9/L (ref 4–11)

## 2019-12-05 PROCEDURE — 80048 BASIC METABOLIC PNL TOTAL CA: CPT | Performed by: EMERGENCY MEDICINE

## 2019-12-05 PROCEDURE — 84702 CHORIONIC GONADOTROPIN TEST: CPT

## 2019-12-05 PROCEDURE — 93005 ELECTROCARDIOGRAM TRACING: CPT | Mod: 76

## 2019-12-05 PROCEDURE — 25000132 ZZH RX MED GY IP 250 OP 250 PS 637: Performed by: EMERGENCY MEDICINE

## 2019-12-05 PROCEDURE — 85379 FIBRIN DEGRADATION QUANT: CPT | Performed by: EMERGENCY MEDICINE

## 2019-12-05 PROCEDURE — 85025 COMPLETE CBC W/AUTO DIFF WBC: CPT | Performed by: EMERGENCY MEDICINE

## 2019-12-05 PROCEDURE — 84484 ASSAY OF TROPONIN QUANT: CPT | Performed by: EMERGENCY MEDICINE

## 2019-12-05 PROCEDURE — 99285 EMERGENCY DEPT VISIT HI MDM: CPT | Mod: 25

## 2019-12-05 PROCEDURE — 25000125 ZZHC RX 250: Performed by: EMERGENCY MEDICINE

## 2019-12-05 PROCEDURE — 71046 X-RAY EXAM CHEST 2 VIEWS: CPT

## 2019-12-05 PROCEDURE — 93005 ELECTROCARDIOGRAM TRACING: CPT

## 2019-12-05 PROCEDURE — 80076 HEPATIC FUNCTION PANEL: CPT | Performed by: EMERGENCY MEDICINE

## 2019-12-05 PROCEDURE — 00000146 ZZHCL STATISTIC GLUCOSE BY METER IP

## 2019-12-05 RX ORDER — ACETAMINOPHEN 325 MG/1
650 TABLET ORAL ONCE
Status: COMPLETED | OUTPATIENT
Start: 2019-12-05 | End: 2019-12-05

## 2019-12-05 RX ORDER — LORAZEPAM 2 MG/ML
0.5 INJECTION INTRAMUSCULAR ONCE
Status: DISCONTINUED | OUTPATIENT
Start: 2019-12-05 | End: 2019-12-06 | Stop reason: HOSPADM

## 2019-12-05 RX ORDER — SUCRALFATE 1 G/1
1 TABLET ORAL ONCE
Status: COMPLETED | OUTPATIENT
Start: 2019-12-05 | End: 2019-12-05

## 2019-12-05 RX ORDER — SUCRALFATE 1 G/1
1 TABLET ORAL 4 TIMES DAILY
Qty: 20 TABLET | Refills: 0 | Status: SHIPPED | OUTPATIENT
Start: 2019-12-05 | End: 2019-12-10

## 2019-12-05 RX ADMIN — ACETAMINOPHEN 650 MG: 325 TABLET, FILM COATED ORAL at 23:04

## 2019-12-05 RX ADMIN — LIDOCAINE HYDROCHLORIDE 30 ML: 20 SOLUTION ORAL; TOPICAL at 21:17

## 2019-12-05 RX ADMIN — SUCRALFATE 1 G: 1 TABLET ORAL at 22:59

## 2019-12-05 ASSESSMENT — ENCOUNTER SYMPTOMS
FEVER: 0
SHORTNESS OF BREATH: 1
COUGH: 0

## 2019-12-05 NOTE — ED AVS SNAPSHOT
[facility] Emergency Department  [address]  [facility] [address]  Phone:  [phone]  Fax:  [phone]                                    Lorna Kenyon   MRN: [id number]    Department:  [facility] Emergency Department   Date of Visit:  12/5/2019           After Visit Summary Signature Page    I have received my discharge instructions, and my questions have been answered. I have discussed any challenges I see with this plan with the nurse or doctor.    ..........................................................................................................................................  Patient/Patient Representative Signature      ..........................................................................................................................................  Patient Representative Print Name and Relationship to Patient    ..................................................               ................................................  Date                                   Time    ..........................................................................................................................................  Reviewed by Signature/Title    ...................................................              ..............................................  Date                                               Time          22EPIC Rev 08/18

## 2019-12-06 LAB
INTERPRETATION ECG - MUSE: NORMAL
INTERPRETATION ECG - MUSE: NORMAL
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
## 2019-12-06 NOTE — ED TRIAGE NOTES
Patient presents via EMS after having an unwitnessed  syncopal episode at home. When patient came around she developed onset of midsternal chest pain and SOB. EMS offered ASA en route and the patient refused.

## 2019-12-06 NOTE — ED PROVIDER NOTES
History     Chief Complaint:  Chest pain    The history is provided by the spouse and the patient. The history is limited by a language barrier. A  was used.      Lorna Kenyon is a 34 year old female, with a history of diabetes and GERD, who presents with her family to the emergency department for evaluation of midsternal chest pain. The patient's  reports the patient was standing in the kitchen approximately one hour prior to evaluation when she fell, with unknown loss of consciousness. She started experiencing midsternal burning chest pain and shortness of breath after falling, which prompted them to call EMS. He denies any fever or cough. He denies any chance of pregnancy, no history hormone use or birth control.  She states feeling very anxious on arrival.      Allergies:  No known drug allergies     Medications:    Flonase  Zantac     Past Medical History:    Diabetes  Allergic rhinitis  GERD    Past Surgical History:     section x2  Cholecystectomy    Family History:    History reviewed. No pertinent family history.     Social History:  Smoking status: Never  Alcohol use: No  Drug use: No  The patient presents to the emergency department with her  and parents.  Marital Status:   [2]     Review of Systems   Unable to perform ROS: Other (supplemented by )   Constitutional: Negative for fever.   Respiratory: Positive for shortness of breath. Negative for cough.    Cardiovascular: Positive for chest pain.     Physical Exam   Patient Vitals for the past 24 hrs:   BP Temp Temp src Pulse Heart Rate Resp SpO2   19 2241 120/74 -- -- 76 -- -- 98 %   19 2200 117/81 -- -- 77 -- 18 99 %   19 2103 (!) 113/90 98.2  F (36.8  C) Oral -- 83 (!) 40 97 %   19 2100 (!) 113/90 -- -- -- -- -- 98 %     Physical Exam  Nursing note and vitals reviewed.  Constitutional: Well nourished.   Eyes: Conjunctiva normal.  Pupils are equal, round, and reactive  to light.   ENT: Nose normal. Mucous membranes pink and moist.    Neck: Normal range of motion.  CVS: Normal rate, regular rhythm.  Normal heart sounds.  No murmur.  Pulmonary: Lungs clear to auscultation bilaterally. No wheezes/rales/rhonchi.  GI: Abdomen soft. Nontender, nondistended. No rigidity or guarding.    MSK: No calf tenderness or swelling.  Neuro: Awake, alert. Speech is normal and fluent. Face is symmetric. EOMI. PERRL. Moves all extremities. Normal finger-nose-finger.  strength equal bilaterally. Equal sensation bilaterally on UE/LE and face. No arm or leg drift. Gait stable   Skin: Skin is warm and dry. No rash noted.   Psychiatric: Anxious appearing,        Emergency Department Course   ECG #1 (21:00:26):  Rate 92 bpm. VT interval 120. QRS duration 84. QT/QTc 354/437. P-R-T axes 62 13 15. Normal sinus rhythm with sinus arrhythmia. Nonspecific abnormality. Abnormal ECG. Interpreted at 2105 by Diana Solomon DO.    ECG#2 (22:51:02):  Rate 75 bpm. VT interval 124. QRS duration 78. QT/QTc 372/415. P-R-T axes 42 12 -6. Normal sinus rhythm. Normal ECG. Interpreted at 2251 by Diana Solomon DO.    Imaging:  Radiology findings were communicated with the patient and family who voiced understanding of the findings.    XR Chest 2 Views  IMPRESSION: Negative chest.  As read by Radiology.    Laboratory:  Laboratory findings were communicated with the patient and family who voiced understanding of the findings.    CBC:  (L) o/w WNL (WBC 4.9, HGB 14.5)  BMP: Glucose 106 (H) o/w WNL (Creatinine 0.54)  ISTAT HCG (2143): <5.0    Troponin I (2138): <0.015  D dimer: 0.3  Hepatic panel: ALT 19, AST 42  Glucose by meter (2142): 101 (H)     Interventions:  2117 GI Cocktail 30 mLs PO  2245 Ativan 0.5 mg IV  2259 Carafate 1 g PO  2304 Tylenol 650 mg PO    Emergency Department Course:  Patient arrived to the emergency department via EMS.    Past medical records, nursing notes, and vitals reviewed.  2114:  "I performed an exam of the patient and obtained history, as documented above.     EKG was obtained and reviewed in the emergency department.    IV inserted and blood drawn.    The patient was sent for a chest x-ray while in the emergency department, results above.     2245: I rechecked the patient. I reviewed the results with the Patient and spouse, mother and father and answered all related questions prior to discharge.     Findings and plan explained to the Patient and spouse, mother and father. Patient discharged home with instructions regarding supportive care, medications, and reasons to return. The importance of close follow-up was reviewed. The patient was prescribed Zantac and Carafate.  Impression & Plan   Medical Decision Making:  HEART Score:    Predicts Major Adverse Cardiac Events within 6 weeks:    History:   Highly suspicious:  2   Moderately suspicious: 1   Slightly/Non-suspicious: 0  ECG:   Significant ST depression 2   Nonspecific repolarization 1   Normal    0  Age:    >=65    2   >45-<65   1   <= 45    0  Risk Factors [DM, Current or recent smoker, HTN, HLP, FMH CAD, Obesity]   >=3 or Hx. CAD  2   1-2    1   None    0  Troponin:   >= 3x normal   2   >1 to <3x normal  1   Normal    0    This Patient's Score:   1    Interpretation:    0-3:    2.5% risk of MACE (may consider D/C)   4-6:    20.3% (Observation)   7-10:    72.7% (Admit, consider early invasive strategy)    ECHO 3/13/2014:  Final Impressions:   1. Normal left ventricular size, normal wall thickness, normal global systolic function, calculated EF of 66 %.   2. Color Doppler suggests a possible PFO.    Patient is a 34-year-old female presenting with reported chest pain and questionable syncopal episode.  She is quite anxious on arrival and complains primarily of \"burning chest pain\".  She has a non-peritoneal abdomen and I doubt intra-abdominal catastrophe including cholecystitis, small bowel obstruction.  EKG without focal ischemia or " underlying arrhythmia.  Screening troponin negative.  The patient is low risk for ACS.  She also had a screening d-dimer drawn given questionable syncope though this was negative and clinically I have a lower suspicion for PE.  Patient's lab work reassuring without profound anemia, electrolyte derangements.  Her liver enzymes are normal and she has no reproducible right upper quadrant tenderness.  I doubt biliary/gallbladder etiology.  Patient is not pregnant today.  Chest x-ray without focal pneumonia, fluid overload, widened mediastinum.  Patient has no audible murmur on exam; I reviewed her echo from 2014.  Unknown if syncopal episode though I do suspect some component of anxiety contributed to presentation. Patient reported some pain improvement after GI cocktail.  She was much calmer after receiving Ativan as well.  I do have concerns that patient's presentation is secondary to mild gastritis/dyspepsia.  I recommended Carafate as well as ranitidine trial.  Patient reports poor compliance with her zantac.  Dietary recommendations discussed.  Planning close outpatient follow-up for reevaluation, return precautions given.    Discharge Diagnosis:    ICD-10-CM   1. Chest pain, unspecified type R07.9   2. Syncope, unspecified syncope type R55     Disposition:  Discharged to home.    Discharge Medications:  New Prescriptions    RANITIDINE (ZANTAC) 150 MG TABLET    Take 1 tablet (150 mg) by mouth 2 times daily for 5 days    SUCRALFATE (CARAFATE) 1 GM TABLET    Take 1 tablet (1 g) by mouth 4 times daily for 5 days     Lanre Bangura  12/5/2019   [facility] EMERGENCY DEPARTMENT  Scribe Disclosure:  I, Lanre Bangura, am serving as a scribe at 9:14 PM on 12/5/2019 to document services personally performed by Diana Solomon DO based on my observations and the provider's statements to me.      Diana Solomon DO  12/05/19 [id number]

## 2020-02-20 ENCOUNTER — HOSPITAL ENCOUNTER (EMERGENCY)
Facility: CLINIC | Age: 35
Discharge: HOME | End: 2020-02-20
Attending: NURSE PRACTITIONER | Admitting: NURSE PRACTITIONER
Payer: COMMERCIAL

## 2020-02-20 VITALS
OXYGEN SATURATION: 100 % | RESPIRATION RATE: 18 BRPM | HEART RATE: 76 BPM | DIASTOLIC BLOOD PRESSURE: 74 MMHG | BODY MASS INDEX: 25.14 KG/M2 | SYSTOLIC BLOOD PRESSURE: 115 MMHG | WEIGHT: 160.5 LBS | TEMPERATURE: 97.8 F

## 2020-02-20 DIAGNOSIS — J02.0 STREPTOCOCCAL PHARYNGITIS: ICD-10-CM

## 2020-02-20 LAB
DEPRECATED S PYO AG THROAT QL EIA: POSITIVE
SPECIMEN SOURCE: ABNORMAL

## 2020-02-20 PROCEDURE — 99283 EMERGENCY DEPT VISIT LOW MDM: CPT

## 2020-02-20 PROCEDURE — 25000132 ZZH RX MED GY IP 250 OP 250 PS 637: Performed by: NURSE PRACTITIONER

## 2020-02-20 PROCEDURE — 87880 STREP A ASSAY W/OPTIC: CPT | Performed by: NURSE PRACTITIONER

## 2020-02-20 RX ORDER — IBUPROFEN 600 MG/1
600 TABLET, FILM COATED ORAL ONCE
Status: COMPLETED | OUTPATIENT
Start: 2020-02-20 | End: 2020-02-20

## 2020-02-20 RX ORDER — PENICILLIN V POTASSIUM 500 MG/1
500 TABLET, FILM COATED ORAL 3 TIMES DAILY
Qty: 30 TABLET | Refills: 0 | Status: SHIPPED | OUTPATIENT
Start: 2020-02-20 | End: 2020-03-01

## 2020-02-20 RX ADMIN — IBUPROFEN 600 MG: 600 TABLET, FILM COATED ORAL at 16:13

## 2020-02-20 ASSESSMENT — ENCOUNTER SYMPTOMS
CHILLS: 1
FEVER: 0
TROUBLE SWALLOWING: 0
COUGH: 0
NAUSEA: 0
VOICE CHANGE: 0
SORE THROAT: 1

## 2020-02-20 NOTE — DISCHARGE INSTRUCTIONS
Take antibiotics as prescribed, finish entire course.  Tylenol, ibuprofen, throat lozenges and sprays for discomfort.

## 2020-02-20 NOTE — ED AVS SNAPSHOT
[facility] Emergency Department  [address]  [facility] [address]  Phone:  [phone]  Fax:  [phone]                                    Lorna Kenyon   MRN: [id number]    Department:  [facility] Emergency Department   Date of Visit:  2/20/2020           After Visit Summary Signature Page    I have received my discharge instructions, and my questions have been answered. I have discussed any challenges I see with this plan with the nurse or doctor.    ..........................................................................................................................................  Patient/Patient Representative Signature      ..........................................................................................................................................  Patient Representative Print Name and Relationship to Patient    ..................................................               ................................................  Date                                   Time    ..........................................................................................................................................  Reviewed by Signature/Title    ...................................................              ..............................................  Date                                               Time          22EPIC Rev 08/18

## 2020-02-20 NOTE — ED PROVIDER NOTES
History     Chief Complaint:  Sore throat    The history is provided by the patient. The history is limited by a language barrier. A  was used (Comoran).      Lrona Kenyon is a 34 year old female who presents to the emergency department for evaluation of a sore throat. The patient reports she has been experiencing a sore throat and chills for the last two days prior to evaluation. She denies any fever or cough. She has been taking ibuprofen for the symptoms. She denies any ill contacts.    Allergies:  No known drug allergies     Medications:    The patient is not currently taking any prescribed medications.     Past Medical History:    Diabetes  Allergic rhinitis  Vitamin D deficiency  GERD  Cardiac murmur  Ovarian cyst    Past Surgical History:     section  Cholecystectomy    Family History:    History reviewed. No pertinent family history.     Social History:  Smoking status: Never  Alcohol use: No  Drug use: No  The patient presents to the emergency department by herself.  Marital Status:   [2]     Review of Systems   Constitutional: Positive for chills. Negative for fever.   HENT: Positive for sore throat. Negative for trouble swallowing and voice change.    Respiratory: Negative for cough.    Gastrointestinal: Negative for nausea.   All other systems reviewed and are negative.      Physical Exam   Patient Vitals for the past 24 hrs:   BP Temp Temp src Pulse Resp SpO2 Weight   20 1543 -- -- -- -- -- -- 72.8 kg (160 lb 7.9 oz)   20 1542 115/74 97.8  F (36.6  C) Oral 76 18 100 % --     Physical Exam  General: Alert, Mild discomfort, well kept   HENT:  Normal voice, No lymphadenopathy, mild bilateral tonsillar enlargement and tonsillith present on right  Eyes:  The pupils are equal, round, and reactive to light, Conjunctiva normal, No scleral icterus   Neck:  Normal range of motion  CV:  Normal Pulses  Resp:  Non-labored, No cough  MS:  Normal muscular tone, moves  all extremities  Skin:  No rash or acute skin lesions noted  Neuro: Speech is normal and fluent  Psych:  Awake. Alert.  Normal affect.  Appropriate interactions. Good eye contact      Emergency Department Course   Laboratory:  Streptococcus A Rapid Scr w Reflx to PCR: Positive    Interventions:  1613 Ibuprofen 600 mg PO    Emergency Department Course:  Past medical records, nursing notes, and vitals reviewed.  1553: I performed an exam of the patient and obtained history, as documented above.     Rapid strep screen obtained and evaluated in the emergency department.    1638: I rechecked the patient. Findings and plan explained to the Patient. Patient discharged home with instructions regarding supportive care, medications, and reasons to return. The importance of close follow-up was reviewed.   Impression & Plan    Medical Decision Making:  Lorna Kenyon is a 34 year old female presented for evaluation of sore throat.  Rapid strep was positive. No indication for peritonsillar or retropharyngeal abscess. No meningismus. Patent airway. Treated with Penicillin and/or symptomatic treatment. Will use tylenol or Ibuprofen for fevers and discomfort. Warm salt water gargles. Follow up with PCP in 3 days if no improvement or immediately if worsening. Advised for immediate return to UR/ED if they develop high fevers not controlled with medications, difficulty swallowing own saliva, inability to open their jaw, or for other concerns.    Diagnosis:    ICD-10-CM   1. Streptococcal pharyngitis J02.0     Disposition:  Discharged to home.    Discharge Medications:  New Prescriptions    PENICILLIN V 500 MG PO TABLET    Take 1 tablet (500 mg) by mouth 3 times daily for 10 days     Lanre Bangura  2/20/2020   [facility] EMERGENCY DEPARTMENT  Scribe Disclosure:  I, Lanre Bangura, am serving as a scribe at 3:53 PM on 2/20/2020 to document services personally performed by Herbert Wei APRN CNP, based on my  observations and the provider's statements to me.      Herbert Wei, APRN CNP  02/20/20 6544

## 2020-02-20 NOTE — ED NOTES
Strep test positive. MD in to see patient and discuss diagnosis, test results, and discharge plan. Patient meets discharge criteria. Discussed AVS with patient. Discussed throwing away toothbrush after starting antibiotic. Questions answered. Patient verbalized understanding. Patient reports being ready to go home. Patient discharged home by car with all necessary information.

## 2020-02-20 NOTE — ED TRIAGE NOTES
Pt reports sore throat x 2 days, no fever at home but pt feels cold. Nothing taken for pain prior to arrival.

## 2020-07-15 ENCOUNTER — HOSPITAL ENCOUNTER (EMERGENCY)
Facility: CLINIC | Age: 35
Discharge: HOME | End: 2020-07-15
Attending: EMERGENCY MEDICINE | Admitting: EMERGENCY MEDICINE
Payer: COMMERCIAL

## 2020-07-15 VITALS
HEART RATE: 80 BPM | SYSTOLIC BLOOD PRESSURE: 117 MMHG | DIASTOLIC BLOOD PRESSURE: 74 MMHG | TEMPERATURE: 98.2 F | RESPIRATION RATE: 18 BRPM | OXYGEN SATURATION: 98 %

## 2020-07-15 DIAGNOSIS — J02.0 STREPTOCOCCAL PHARYNGITIS: ICD-10-CM

## 2020-07-15 LAB
ALBUMIN SERPL-MCNC: 4.1 G/DL (ref 3.4–5)
ALP SERPL-CCNC: 76 U/L (ref 40–150)
ALT SERPL W P-5'-P-CCNC: 19 U/L (ref 0–50)
ANION GAP SERPL CALCULATED.3IONS-SCNC: 4 MMOL/L (ref 3–14)
AST SERPL W P-5'-P-CCNC: 21 U/L (ref 0–45)
BASOPHILS # BLD AUTO: 0 10E9/L (ref 0–0.2)
BASOPHILS NFR BLD AUTO: 0.6 %
BILIRUB DIRECT SERPL-MCNC: 0.1 MG/DL (ref 0–0.2)
BILIRUB SERPL-MCNC: 0.4 MG/DL (ref 0.2–1.3)
BUN SERPL-MCNC: 9 MG/DL (ref 7–30)
CALCIUM SERPL-MCNC: 8.9 MG/DL (ref 8.5–10.1)
CHLORIDE SERPL-SCNC: 106 MMOL/L (ref 94–109)
CO2 SERPL-SCNC: 29 MMOL/L (ref 20–32)
CREAT SERPL-MCNC: 0.52 MG/DL (ref 0.52–1.04)
DEPRECATED S PYO AG THROAT QL EIA: POSITIVE
DIFFERENTIAL METHOD BLD: NORMAL
EOSINOPHIL # BLD AUTO: 0.3 10E9/L (ref 0–0.7)
EOSINOPHIL NFR BLD AUTO: 5.6 %
ERYTHROCYTE [DISTWIDTH] IN BLOOD BY AUTOMATED COUNT: 11.9 % (ref 10–15)
GFR SERPL CREATININE-BSD FRML MDRD: >90 ML/MIN/{1.73_M2}
GLUCOSE SERPL-MCNC: 76 MG/DL (ref 70–99)
HCT VFR BLD AUTO: 42.3 % (ref 35–47)
HGB BLD-MCNC: 13.8 G/DL (ref 11.7–15.7)
IMM GRANULOCYTES # BLD: 0 10E9/L (ref 0–0.4)
IMM GRANULOCYTES NFR BLD: 0.2 %
LIPASE SERPL-CCNC: 75 U/L (ref 73–393)
LYMPHOCYTES # BLD AUTO: 1.8 10E9/L (ref 0.8–5.3)
LYMPHOCYTES NFR BLD AUTO: 37.3 %
MCH RBC QN AUTO: 29.9 PG (ref 26.5–33)
MCHC RBC AUTO-ENTMCNC: 32.6 G/DL (ref 31.5–36.5)
MCV RBC AUTO: 92 FL (ref 78–100)
MONOCYTES # BLD AUTO: 0.3 10E9/L (ref 0–1.3)
MONOCYTES NFR BLD AUTO: 6.5 %
NEUTROPHILS # BLD AUTO: 2.4 10E9/L (ref 1.6–8.3)
NEUTROPHILS NFR BLD AUTO: 49.8 %
NRBC # BLD AUTO: 0 10*3/UL
NRBC BLD AUTO-RTO: 0 /100
PLATELET # BLD AUTO: 236 10E9/L (ref 150–450)
POTASSIUM SERPL-SCNC: 3.6 MMOL/L (ref 3.4–5.3)
PROT SERPL-MCNC: 8.3 G/DL (ref 6.8–8.8)
RBC # BLD AUTO: 4.62 10E12/L (ref 3.8–5.2)
SODIUM SERPL-SCNC: 139 MMOL/L (ref 133–144)
SPECIMEN SOURCE: ABNORMAL
WBC # BLD AUTO: 4.8 10E9/L (ref 4–11)

## 2020-07-15 PROCEDURE — 85025 COMPLETE CBC W/AUTO DIFF WBC: CPT | Performed by: EMERGENCY MEDICINE

## 2020-07-15 PROCEDURE — 99283 EMERGENCY DEPT VISIT LOW MDM: CPT

## 2020-07-15 PROCEDURE — C9803 HOPD COVID-19 SPEC COLLECT: HCPCS

## 2020-07-15 PROCEDURE — U0003 INFECTIOUS AGENT DETECTION BY NUCLEIC ACID (DNA OR RNA); SEVERE ACUTE RESPIRATORY SYNDROME CORONAVIRUS 2 (SARS-COV-2) (CORONAVIRUS DISEASE [COVID-19]), AMPLIFIED PROBE TECHNIQUE, MAKING USE OF HIGH THROUGHPUT TECHNOLOGIES AS DESCRIBED BY CMS-2020-01-R: HCPCS | Performed by: EMERGENCY MEDICINE

## 2020-07-15 PROCEDURE — 87880 STREP A ASSAY W/OPTIC: CPT | Performed by: EMERGENCY MEDICINE

## 2020-07-15 PROCEDURE — 83690 ASSAY OF LIPASE: CPT | Performed by: EMERGENCY MEDICINE

## 2020-07-15 PROCEDURE — 25000132 ZZH RX MED GY IP 250 OP 250 PS 637: Performed by: EMERGENCY MEDICINE

## 2020-07-15 PROCEDURE — 80048 BASIC METABOLIC PNL TOTAL CA: CPT | Performed by: EMERGENCY MEDICINE

## 2020-07-15 PROCEDURE — 80076 HEPATIC FUNCTION PANEL: CPT | Performed by: EMERGENCY MEDICINE

## 2020-07-15 PROCEDURE — 25000125 ZZHC RX 250: Performed by: EMERGENCY MEDICINE

## 2020-07-15 RX ORDER — AMOXICILLIN 500 MG/1
1000 CAPSULE ORAL 2 TIMES DAILY
Qty: 40 CAPSULE | Refills: 0 | Status: SHIPPED | OUTPATIENT
Start: 2020-07-15 | End: 2020-07-25

## 2020-07-15 RX ADMIN — LIDOCAINE HYDROCHLORIDE 30 ML: 20 SOLUTION ORAL; TOPICAL at 14:28

## 2020-07-15 ASSESSMENT — ENCOUNTER SYMPTOMS
VOMITING: 1
ABDOMINAL PAIN: 1
SORE THROAT: 1
FEVER: 0

## 2020-07-15 NOTE — ED PROVIDER NOTES
History     Chief Complaint:  Pharyngitis and Abdominal Pain    HPI   Lorna Kenyon is a 35 year old female with a history of diabetse who presents to the emergency department for evaluation of pharyngitis and abdominal pain.  Patient reports she has had a sore throat for the past 3 days.  She also noticed today that her right eye was red and itchy.  She does not wear contacts.  She has had some nausea and vomiting as well.  She is also had some epigastric pain that is not severe.  She has never had pain like this before.  Her pain is not worse with food.  She denies any fevers.  She is otherwise healthy.  She does not think she is pregnant but she is not sure.      Allergies:  No Known Allergies    Medications:    Roxicodone  Senokot    Past Medical History:    Diabetes  Wounds and injuries    Past Surgical History:      Cholecystectomy    Family History:    No past pertinent family history.    Social History:  Smoking Status: Never  Smokeless Tobacco: Never   Alcohol Use: No  Drug Use: No  Marital Status:        Review of Systems   Constitutional: Negative for fever.   HENT: Positive for sore throat.    Gastrointestinal: Positive for abdominal pain and vomiting.   All other systems reviewed and are negative.      Physical Exam   Vitals:  Patient Vitals for the past 24 hrs:   BP Temp Temp src Pulse Heart Rate Resp SpO2   07/15/20 1430 -- -- -- -- -- -- 99 %   07/15/20 1415 109/73 -- -- 78 -- -- 100 %   07/15/20 1345 110/68 -- -- 83 -- -- 100 %   07/15/20 1139 113/84 98.2  F (36.8  C) Oral -- 75 18 97 %       Physical Exam  General: Patient is alert and interactive when I enter the room  Head:  The scalp, face, and head appear normal  Eyes:  Mild right conjunctival injection, pupils equally reactive bilaterally  ENT:    The nose is normal    Pinnae are normal    External acoustic canals are normal    Posterior pharynx with mild erythema and tonsillar exudate on left tonsil, no peritonsillar  abscess  Neck:  Trachea midline, good range of motion  CV:  Pulses are normal    Resp:  No respiratory distress   Abdomen:      Soft, very mild epigastric tenderness on exam, non-distended  Musc:  Normal muscular tone    No major joint effusions  Skin:  No rash or lesions noted  Neuro:  Speech is normal and fluent. Face is symmetric.     Moving all extremities well.   Psych: Awake. Alert.  Normal affect.  Appropriate interactions.      Emergency Department Course     Laboratory:  CBC: WNL. (WBC 4.8, HGB 13.8, )   BMP: AWNL (Creatinine 0.52)    Lipase: 75  Hepatic Panel: AWNL    Rapid Strep Test: Positive    Symptomatic COVID-19 Virus by PCR: Pending  Strep Culture: Pending    Interventions:  1428 GI Cocktail 15 mL PO    Emergency Department Course:  Past medical records, nursing notes, and vitals reviewed.    1318 I performed an exam of the patient as documented above.     IV was inserted and blood was drawn for laboratory testing, results above.  The patient provided a urine sample here in the emergency department. This was sent for laboratory testing, findings above.     1450 I rechecked the patient and discussed the results of her workup thus far.     Findings and plan explained to the Patient. Patient discharged home with instructions regarding supportive care, medications, and reasons to return. The importance of close follow-up was reviewed. The patient was prescribed Amoxil.    I personally reviewed the laboratory results with the Patient and answered all related questions prior to discharge.       Impression & Plan      Covid-19  Lorna Kenyon was evaluated during a global COVID-19 pandemic, which necessitated consideration that the patient might be at risk for infection with the SARS-CoV-2 virus that causes COVID-19.   Applicable protocols for evaluation were followed during the patient's care.   COVID-19 was considered as part of the patient's evaluation. The plan for testing is:  a test was  obtained during this visit.    Medical Decision Making:  Lorna Kenyon is a 34 yo female who presents with sore throat and abdominal pain.  Abdominal pain is located in her epigastrium.  Her abdomen is quite benign.  She does have tonsillar exudate on exam and does endorse a sore throat.  She also has some right mild conjunctival injection.  This could be a viral illness but we did do a strep which revealed a positive strep.  With her sore throat we will start on antibiotics.  Blood work for epigastric abdominal pain was unremarkable.  Patient given a GI cocktail.  Patient discharged.      Diagnosis:    ICD-10-CM    1. Streptococcal pharyngitis  J02.0        Disposition:  discharged to home    Discharge Medications:  New Prescriptions    AMOXICILLIN (AMOXIL) 500 MG CAPSULE    Take 2 capsules (1,000 mg) by mouth 2 times daily for 10 days     Corey HERNANDEZ, am serving as a scribe on 7/15/2020 at 1:16 PM to personally document services performed by Nelda Benoit MD based on my observations and the provider's statements to me.   Corey Grace  7/15/2020   [facility] EMERGENCY DEPARTMENT       Nelda Benoit MD  07/15/20 9577

## 2020-07-15 NOTE — ED AVS SNAPSHOT
[facility] Emergency Department  [address]  [facility] [address]  Phone:  [phone]  Fax:  [phone]                                    Lorna Kenyon   MRN: [id number]    Department:  [facility] Emergency Department   Date of Visit:  7/15/2020           After Visit Summary Signature Page    I have received my discharge instructions, and my questions have been answered. I have discussed any challenges I see with this plan with the nurse or doctor.    ..........................................................................................................................................  Patient/Patient Representative Signature      ..........................................................................................................................................  Patient Representative Print Name and Relationship to Patient    ..................................................               ................................................  Date                                   Time    ..........................................................................................................................................  Reviewed by Signature/Title    ...................................................              ..............................................  Date                                               Time          22EPIC Rev 08/18

## 2020-07-15 NOTE — ED TRIAGE NOTES
Pt arrives with 3 days of epigastric pain and sore throat. Pt states also today L eye is red and itchy. States one episode of vomiting.

## 2020-07-15 NOTE — LETTER
July 17, 2020        Lorna Kenyon  [address] [facility] [address]    This letter provides a written record that you were tested for COVID-19 on 07/15/20.       Your result was negative. This means that we didn t find the virus that causes COVID-19 in your sample. A test may show negative when you do actually have the virus. This can happen when the virus is in the early stages of infection, before you feel illness symptoms.    If you have symptoms   Stay home and away from others (self-isolate) until you meet ALL of the guidelines below:    You ve had no fever--and no medicine that reduces fever--for 3 full days (72 hours). And      Your other symptoms have gotten better. For example, your cough or breathing has improved. And     At least 10 days have passed since your symptoms started.    During this time:    Stay home. Don t go to work, school or anywhere else.     Stay in your own room, including for meals. Use your own bathroom if you can.    Stay away from others in your home. No hugging, kissing or shaking hands. No visitors.    Clean  high touch  surfaces often (doorknobs, counters, handles, etc.). Use a household cleaning spray or wipes. You can find a full list on the EPA website at www.epa.gov/pesticide-registration/list-n-disinfectants-use-against-sars-cov-2.    Cover your mouth and nose with a mask, tissue or washcloth to avoid spreading germs.    Wash your hands and face often with soap and water.    Going back to work  Check with your employer for any guidelines to follow for going back to work.    Employers: This document serves as formal notice that your employee tested negative for COVID-19, as of the testing date shown above.

## 2020-07-16 LAB
SARS-COV-2 RNA SPEC QL NAA+PROBE: NOT DETECTED
SPECIMEN SOURCE: NORMAL

## 2021-10-13 NOTE — PROGRESS NOTES
This is a recent snapshot of the patient's [facility] medical record.  For current drug dose and complete information and questions, call [phone] or In Basket pool, [facility] ([id number])  CSN Number:  [id number]       Instructions: This plan will send the code FBSD to the PM system.  DO NOT or CHANGE the price. Detail Level: Simple Price (Do Not Change): 0.00

## 2022-02-13 ENCOUNTER — HOSPITAL ENCOUNTER (EMERGENCY)
Facility: CLINIC | Age: 37
Discharge: HOME | End: 2022-02-13
Attending: EMERGENCY MEDICINE | Admitting: EMERGENCY MEDICINE
Payer: COMMERCIAL

## 2022-02-13 ENCOUNTER — APPOINTMENT (OUTPATIENT)
Dept: GENERAL RADIOLOGY | Facility: CLINIC | Age: 37
End: 2022-02-13
Attending: EMERGENCY MEDICINE
Payer: COMMERCIAL

## 2022-02-13 VITALS
OXYGEN SATURATION: 100 % | RESPIRATION RATE: 16 BRPM | DIASTOLIC BLOOD PRESSURE: 78 MMHG | HEART RATE: 76 BPM | TEMPERATURE: 97.8 F | SYSTOLIC BLOOD PRESSURE: 110 MMHG

## 2022-02-13 DIAGNOSIS — R07.89 CHEST WALL PAIN: ICD-10-CM

## 2022-02-13 PROCEDURE — 99284 EMERGENCY DEPT VISIT MOD MDM: CPT | Mod: 25

## 2022-02-13 PROCEDURE — 250N000013 HC RX MED GY IP 250 OP 250 PS 637: Performed by: EMERGENCY MEDICINE

## 2022-02-13 PROCEDURE — 71046 X-RAY EXAM CHEST 2 VIEWS: CPT

## 2022-02-13 RX ORDER — CYCLOBENZAPRINE HCL 10 MG
10 TABLET ORAL 3 TIMES DAILY PRN
Qty: 10 TABLET | Refills: 0 | Status: ON HOLD | OUTPATIENT
Start: 2022-02-13 | End: 2023-10-22

## 2022-02-13 RX ORDER — KETOROLAC TROMETHAMINE 30 MG/ML
30 INJECTION, SOLUTION INTRAMUSCULAR; INTRAVENOUS ONCE
Status: DISCONTINUED | OUTPATIENT
Start: 2022-02-13 | End: 2022-02-13 | Stop reason: HOSPADM

## 2022-02-13 RX ORDER — IBUPROFEN 600 MG/1
600 TABLET, FILM COATED ORAL ONCE
Status: DISCONTINUED | OUTPATIENT
Start: 2022-02-13 | End: 2022-02-13 | Stop reason: HOSPADM

## 2022-02-13 RX ORDER — OXYCODONE HYDROCHLORIDE 5 MG/1
5 TABLET ORAL ONCE
Status: COMPLETED | OUTPATIENT
Start: 2022-02-13 | End: 2022-02-13

## 2022-02-13 RX ADMIN — OXYCODONE HYDROCHLORIDE 5 MG: 5 TABLET ORAL at 01:31

## 2022-02-13 NOTE — ED TRIAGE NOTES
Pt c/o right sided back pain inferior to right scapula for 3 days pta, worse tonight. ABCs intact GCS 15

## 2022-02-13 NOTE — ED PROVIDER NOTES
History     Chief Complaint:    Back Pain      HPI   Lorna Kenyon is a 36 year old female with history of diabetes who presents for evaluation of right upper back pain.  Patient notes that pain to the right back just below the scapula began 3 days ago in the absence of lifting, twisting, fall, or other injury.  Symptoms have been constant since then, worse this evening.  Pain is better with standing up or lying down but is worse with sitting or movement.  She denies pleuritic discomfort, shortness of breath, rash, cough, fever, or other concerns.    Review of Systems  MSK: Positive as per above  All other systems reviewed negative    Allergies:  No Known Allergies      Medications:    ibuprofen (ADVIL/MOTRIN) 600 MG tablet  oxyCODONE IR (ROXICODONE) 5 MG tablet  Prenatal Vit-Fe Fumarate-FA (PRENATAL VITAMIN PO)  senna-docusate (SENOKOT-S;PERICOLACE) 8.6-50 MG per tablet        Past Medical History:    Past Medical History:   Diagnosis Date     Diabetes (H)      Gestational diabetes      Wounds and injuries         Past Surgical History:    Past Surgical History:   Procedure Laterality Date      SECTION N/A 2018    Procedure:  SECTION;;  Surgeon: Marjan Bruner MD;  Location:  L+D     LAPAROSCOPIC CHOLECYSTECTOMY  2014    Procedure: LAPAROSCOPIC CHOLECYSTECTOMY;  Surgeon: Camacho Jeronimo MD;  Location:  OR       Family History:    None    Social History:  Presents with family member    Physical Exam     Patient Vitals for the past 24 hrs:   BP Temp Temp src Pulse Resp SpO2   22 0053 115/70 97.8  F (36.6  C) Oral 81 20 99 %       Physical Exam  Constitutional: Alert, attentive  HENT:    Nose: Nose normal.    Mouth/Throat: Oropharynx is clear, mucous membranes are moist   Eyes: EOM are normal.   CV: regular rate and rhythm; no murmurs, rubs or gallups  Chest: Effort normal and breath sounds normal.    Point tenderness to the right posterior chest wall just  below the scapula; no bony tenderness to the scapula, no palpable crepitus or rib fracture  GI:  There is no tenderness. No distension. Normal bowel sounds  MSK: Normal range of motion.   Neurological: Alert, attentive  Skin: Skin is warm and dry.      Emergency Department Course     Results for orders placed or performed during the hospital encounter of 02/13/22 (from the past 24 hour(s))   XR Chest 2 Views    Narrative    EXAM: XR CHEST 2 VW  LOCATION: [facility]  DATE/TIME: 2/13/2022 2:08 AM    INDICATION: right posterior chest wall pain  COMPARISON: None.      Impression    IMPRESSION: Negative chest. No displaced rib fracture.         Emergency Department Course:    Reviewed:    I reviewed nursing notes, vitals and past history    Assessments:   I obtained history and examined the patient as noted above.    I rechecked the patient and explained findings.     Interventions:           oxyCODONE (ROXICODONE) tablet 5 mg (5 mg Oral Given 2/13/22 0131)       Disposition:  The patient was discharged to home.    Impression & Plan      Medical Decision Making:  This is a pleasant 36-year-old female presents for evaluation of right posterior chest wall pain just below the scapula.  Differential includes possible PE, muscle strain, spasm, among others.  She has no flank or abdominal pain to suggest ureterolithiasis or pyelonephritis.  She is PERC negative, essentially ruling out PE.  Chest x-ray shows no pneumothorax, rib abnormality, or other process.  She declined Toradol here but had improved pain with oxycodone.  Plan supportive cares at home for probable musculoskeletal pain.  Primary care follow-up in 3 to 5 days return precautions for worse pain, shortness of breath, or any other concerns.    Diagnosis:    ICD-10-CM    1. Chest wall pain  R07.89        Discharge Medications:  Discharge Medication List as of 2/13/2022  3:00 AM      START taking these medications    Details   cyclobenzaprine  (FLEXERIL) 10 MG tablet Take 1 tablet (10 mg) by mouth 3 times daily as needed for muscle spasms, Disp-10 tablet, R-0, Local Print                Jan Powers MD  02/13/22 9715

## 2022-03-12 ENCOUNTER — HOSPITAL ENCOUNTER (EMERGENCY)
Facility: CLINIC | Age: 37
Discharge: HOME | End: 2022-03-12
Attending: EMERGENCY MEDICINE | Admitting: EMERGENCY MEDICINE
Payer: COMMERCIAL

## 2022-03-12 VITALS
SYSTOLIC BLOOD PRESSURE: 102 MMHG | TEMPERATURE: 97.9 F | HEART RATE: 75 BPM | OXYGEN SATURATION: 99 % | DIASTOLIC BLOOD PRESSURE: 61 MMHG | RESPIRATION RATE: 16 BRPM

## 2022-03-12 DIAGNOSIS — G89.29 CHRONIC MIDLINE LOW BACK PAIN WITHOUT SCIATICA: ICD-10-CM

## 2022-03-12 DIAGNOSIS — M54.50 CHRONIC MIDLINE LOW BACK PAIN WITHOUT SCIATICA: ICD-10-CM

## 2022-03-12 LAB
ANION GAP SERPL CALCULATED.3IONS-SCNC: 2 MMOL/L (ref 3–14)
BASOPHILS # BLD AUTO: 0 10E3/UL (ref 0–0.2)
BASOPHILS NFR BLD AUTO: 0 %
BUN SERPL-MCNC: 10 MG/DL (ref 7–30)
CALCIUM SERPL-MCNC: 9.3 MG/DL (ref 8.5–10.1)
CHLORIDE BLD-SCNC: 106 MMOL/L (ref 94–109)
CO2 SERPL-SCNC: 31 MMOL/L (ref 20–32)
CREAT SERPL-MCNC: 0.62 MG/DL (ref 0.52–1.04)
EOSINOPHIL # BLD AUTO: 0.2 10E3/UL (ref 0–0.7)
EOSINOPHIL NFR BLD AUTO: 5 %
ERYTHROCYTE [DISTWIDTH] IN BLOOD BY AUTOMATED COUNT: 12.4 % (ref 10–15)
GFR SERPL CREATININE-BSD FRML MDRD: >90 ML/MIN/1.73M2
GLUCOSE BLD-MCNC: 100 MG/DL (ref 70–99)
HCG SERPL QL: NEGATIVE
HCT VFR BLD AUTO: 43.8 % (ref 35–47)
HGB BLD-MCNC: 14.3 G/DL (ref 11.7–15.7)
HOLD SPECIMEN: NORMAL
IMM GRANULOCYTES # BLD: 0 10E3/UL
IMM GRANULOCYTES NFR BLD: 0 %
LYMPHOCYTES # BLD AUTO: 1.4 10E3/UL (ref 0.8–5.3)
LYMPHOCYTES NFR BLD AUTO: 34 %
MCH RBC QN AUTO: 29.5 PG (ref 26.5–33)
MCHC RBC AUTO-ENTMCNC: 32.6 G/DL (ref 31.5–36.5)
MCV RBC AUTO: 90 FL (ref 78–100)
MONOCYTES # BLD AUTO: 0.3 10E3/UL (ref 0–1.3)
MONOCYTES NFR BLD AUTO: 8 %
NEUTROPHILS # BLD AUTO: 2.1 10E3/UL (ref 1.6–8.3)
NEUTROPHILS NFR BLD AUTO: 53 %
NRBC # BLD AUTO: 0 10E3/UL
NRBC BLD AUTO-RTO: 0 /100
PLATELET # BLD AUTO: 186 10E3/UL (ref 150–450)
POTASSIUM BLD-SCNC: 3.8 MMOL/L (ref 3.4–5.3)
RBC # BLD AUTO: 4.85 10E6/UL (ref 3.8–5.2)
SODIUM SERPL-SCNC: 139 MMOL/L (ref 133–144)
WBC # BLD AUTO: 4 10E3/UL (ref 4–11)

## 2022-03-12 PROCEDURE — 84703 CHORIONIC GONADOTROPIN ASSAY: CPT | Performed by: EMERGENCY MEDICINE

## 2022-03-12 PROCEDURE — 99283 EMERGENCY DEPT VISIT LOW MDM: CPT

## 2022-03-12 PROCEDURE — 36415 COLL VENOUS BLD VENIPUNCTURE: CPT | Performed by: EMERGENCY MEDICINE

## 2022-03-12 PROCEDURE — 85025 COMPLETE CBC W/AUTO DIFF WBC: CPT | Performed by: EMERGENCY MEDICINE

## 2022-03-12 PROCEDURE — 80048 BASIC METABOLIC PNL TOTAL CA: CPT | Performed by: EMERGENCY MEDICINE

## 2022-03-12 RX ORDER — LIDOCAINE 50 MG/G
1 PATCH TOPICAL EVERY 24 HOURS
Qty: 10 PATCH | Refills: 0 | Status: SHIPPED | OUTPATIENT
Start: 2022-03-12 | End: 2022-03-22

## 2022-03-12 RX ORDER — NAPROXEN 500 MG/1
500 TABLET ORAL 2 TIMES DAILY WITH MEALS
Qty: 24 TABLET | Refills: 0 | Status: SHIPPED | OUTPATIENT
Start: 2022-03-12 | End: 2022-03-24

## 2022-03-12 ASSESSMENT — ENCOUNTER SYMPTOMS
ABDOMINAL PAIN: 0
COUGH: 0
BACK PAIN: 1
CHILLS: 0
FEVER: 0

## 2022-03-12 NOTE — ED TRIAGE NOTES
Patient presents with back pain and body aches. Onset of back pain a month ago. Was seen in the ED last month for back pain and prescribed flexeril at discharge. Last dose flexeril last week. States she hasn't been taking it because it makes her sleepy and she has little children at home. Body aches started about 4 days ago. Patient states her period is late and she might be pregnant. Denies fevers. No known COVID exposure.

## 2022-03-12 NOTE — ED PROVIDER NOTES
History   Chief Complaint:  Back Pain     The history is provided by the patient. A  was used (Dutch).      Lorna Kenyon is a 36 year old female who presents for evaluation of back pain. About a month ago the patient started to develop aching pain across her chest wall and back. She was seen in the ED on  regarding this at which time she had an unremarkable X-ray and she was prescribed Flexeril. Since then she has continued to have back pain and she has run out of the Flexeril. Due to concern for her ongoing pain today she came into the ED. She expresses concern that she could be pregnant as her menstrual period is late. Her last menstrual period was 22. She has not had any fever, chills, cough, or abdominal pain. She has no known COVID-19 exposures.     Review of Systems   Constitutional: Negative for chills and fever.   Respiratory: Negative for cough.    Cardiovascular: Positive for chest pain.   Gastrointestinal: Negative for abdominal pain.   Musculoskeletal: Positive for back pain.   All other systems reviewed and are negative.      Allergies:  No known drug allergies     Medications:  The patient is not currently taking any prescribed medications.     Past Medical History:     Gestational diabetes       Past Surgical History:     section  Laparoscopic cholecystectomy     Social History:  The patient presents to the ED accompanied by her son.   The patient speaks Dutch primarily.     Physical Exam     Patient Vitals for the past 24 hrs:   BP Temp Temp src Pulse Resp SpO2   22 0925 110/79 97.9  F (36.6  C) Oral 88 18 99 %       Physical Exam  Constitutional: Well appearing.  HEENT: Atraumatic. Moist mucous membranes.  Neck: Soft.  Supple.   Cardiac: Regular rate and rhythm.  No murmur or rub.  Respiratory: Clear to auscultation bilaterally.  No respiratory distress.    Abdomen: Soft and nontender. Nondistended.  Musculoskeletal: Tenderness of the mid bilateral  paraspinal muscle areas of the back.  No edema.  Normal range of motion.  Neurologic: Alert and oriented.  Normal tone and bulk. 5/5 strength in bilateral upper and lower extremities.  Sensation to light touch intact throughout.  Normal gait.  Skin: No rashes.  No edema.  Psych: Normal affect.  Normal behavior.      Emergency Department Course     Laboratory:  Labs Ordered and Resulted from Time of ED Arrival to Time of ED Departure   BASIC METABOLIC PANEL - Abnormal       Result Value    Sodium 139      Potassium 3.8      Chloride 106      Carbon Dioxide (CO2) 31      Anion Gap 2 (*)     Urea Nitrogen 10      Creatinine 0.62      Calcium 9.3      Glucose 100 (*)     GFR Estimate >90     HCG QUALITATIVE PREGNANCY - Normal    hCG Serum Qualitative Negative     CBC WITH PLATELETS AND DIFFERENTIAL    WBC Count 4.0      RBC Count 4.85      Hemoglobin 14.3      Hematocrit 43.8      MCV 90      MCH 29.5      MCHC 32.6      RDW 12.4      Platelet Count 186      % Neutrophils 53      % Lymphocytes 34      % Monocytes 8      % Eosinophils 5      % Basophils 0      % Immature Granulocytes 0      NRBCs per 100 WBC 0      Absolute Neutrophils 2.1      Absolute Lymphocytes 1.4      Absolute Monocytes 0.3      Absolute Eosinophils 0.2      Absolute Basophils 0.0      Absolute Immature Granulocytes 0.0      Absolute NRBCs 0.0        Emergency Department Course:     Reviewed:  I reviewed nursing notes, vitals and past medical history    Assessments:  0948: I obtained history and examined the patient as noted above.     1130: I updated and reassessed the patient.      Disposition:  The patient was discharged to home.     Impression & Plan   Medical Decision Making:  Lorna Kenyon is a 36-year-old woman who is afebrile and hemodynamically stable.  She has reproducible back pain over the paraspinal muscle areas of the mid back.  She is neurologically intact in lower extremities.  She has no red flag back pain symptoms that would  necessitate emergent imaging such as MRI to rule out infectious inflammatory condition such as cauda equina syndrome, epidural abscess, or other.  She has no abdominal pain or tenderness.  Lab work-up is noted as above and is unrevealing.  She was concerned about missing her period recently and concern for pregnancy, however, her pregnancy test today is negative.  We discussed the need to follow closely the primary care physician.  We will try naproxen and Lidoderm patches we discussed supportive care at home.  She was given strict return precautions and her questions were answered.  She was in no distress at time of discharge.     Diagnosis:    ICD-10-CM    1. Chronic midline low back pain without sciatica  M54.50     G89.29       Discharge Medications:  New Prescriptions    LIDOCAINE (LIDODERM) 5 % PATCH    Place 1 patch onto the skin every 24 hours for 10 days    NAPROXEN (NAPROSYN) 500 MG TABLET    Take 1 tablet (500 mg) by mouth 2 times daily (with meals) for 12 days       Scribe Disclosure:  Ermias HERNANDEZ, am serving as a scribe at 9:34 AM on 3/12/2022 to document services personally performed by Deshawn Saavedra MD based on my observations and the provider's statements to me.           Deshawn Saavedra MD  03/13/22 [id number]

## 2022-05-20 ENCOUNTER — HOSPITAL ENCOUNTER (EMERGENCY)
Facility: CLINIC | Age: 37
Discharge: HOME | End: 2022-05-20
Attending: PHYSICIAN ASSISTANT | Admitting: PHYSICIAN ASSISTANT
Payer: COMMERCIAL

## 2022-05-20 VITALS
BODY MASS INDEX: 25.84 KG/M2 | HEART RATE: 75 BPM | WEIGHT: 165 LBS | OXYGEN SATURATION: 93 % | DIASTOLIC BLOOD PRESSURE: 76 MMHG | RESPIRATION RATE: 18 BRPM | TEMPERATURE: 97.6 F | SYSTOLIC BLOOD PRESSURE: 115 MMHG

## 2022-05-20 DIAGNOSIS — H66.92 ACUTE LEFT OTITIS MEDIA: ICD-10-CM

## 2022-05-20 PROCEDURE — 99283 EMERGENCY DEPT VISIT LOW MDM: CPT

## 2022-05-20 PROCEDURE — 250N000013 HC RX MED GY IP 250 OP 250 PS 637: Performed by: PHYSICIAN ASSISTANT

## 2022-05-20 RX ORDER — IBUPROFEN 600 MG/1
600 TABLET, FILM COATED ORAL ONCE
Status: COMPLETED | OUTPATIENT
Start: 2022-05-20 | End: 2022-05-20

## 2022-05-20 RX ADMIN — IBUPROFEN 600 MG: 600 TABLET ORAL at 15:15

## 2022-05-20 ASSESSMENT — ENCOUNTER SYMPTOMS
SINUS PAIN: 0
CHILLS: 0
SORE THROAT: 0
FEVER: 0
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
## 2022-05-20 NOTE — ED TRIAGE NOTES
Pt reports that she has had left ear pain for 2 days, no fevers at home, took ibuprofen this morning. PT VSS and ABC's intact

## 2022-06-14 NOTE — PLAN OF CARE
Problem: Patient Care Overview  Goal: Plan of Care/Patient Progress Review  Outcome: Improving  Pt up and mini. Voiding without difficulty. IV discontinued, no longer indicated. Fundus firm and midline. Tylenol, ibuprofen, and oxy effective for pain management- ibuprofen encouraged for pain management. Abdominal binder given for support. Steri-strips in place. Breastfeeding with minimal assistance with formula supplementation per pt request. Reinforcement needed on amount of formula needed per feeding and to throw away formula after one hour of being open. Continue to monitor.         Use Map Statement For Sites (Optional): Yes

## 2022-06-30 NOTE — ADDENDUM NOTE
Addendum  created 08/06/18 0825 by Jan Santiago MD    Sign clinical note       Ketoconazole Pregnancy And Lactation Text: This medication is Pregnancy Category C and it isn't know if it is safe during pregnancy. It is also excreted in breast milk and breast feeding isn't recommended.

## 2022-10-31 ENCOUNTER — HOSPITAL ENCOUNTER (EMERGENCY)
Facility: CLINIC | Age: 37
Discharge: HOME | End: 2022-11-01
Attending: EMERGENCY MEDICINE | Admitting: EMERGENCY MEDICINE
Payer: COMMERCIAL

## 2022-10-31 DIAGNOSIS — R10.13 EPIGASTRIC PAIN: ICD-10-CM

## 2022-10-31 LAB
ALBUMIN SERPL BCG-MCNC: 4.3 G/DL (ref 3.5–5.2)
ALP SERPL-CCNC: 93 U/L (ref 35–104)
ALT SERPL W P-5'-P-CCNC: 10 U/L (ref 10–35)
ANION GAP SERPL CALCULATED.3IONS-SCNC: 9 MMOL/L (ref 7–15)
AST SERPL W P-5'-P-CCNC: 21 U/L (ref 10–35)
BILIRUB SERPL-MCNC: 0.2 MG/DL
BUN SERPL-MCNC: 10.9 MG/DL (ref 6–20)
CALCIUM SERPL-MCNC: 9.2 MG/DL (ref 8.6–10)
CHLORIDE SERPL-SCNC: 105 MMOL/L (ref 98–107)
CREAT SERPL-MCNC: 0.6 MG/DL (ref 0.51–0.95)
DEPRECATED HCO3 PLAS-SCNC: 27 MMOL/L (ref 22–29)
ERYTHROCYTE [DISTWIDTH] IN BLOOD BY AUTOMATED COUNT: 11.9 % (ref 10–15)
GFR SERPL CREATININE-BSD FRML MDRD: >90 ML/MIN/1.73M2
GLUCOSE SERPL-MCNC: 99 MG/DL (ref 70–99)
HCT VFR BLD AUTO: 43 % (ref 35–47)
HGB BLD-MCNC: 13.9 G/DL (ref 11.7–15.7)
LIPASE SERPL-CCNC: 24 U/L (ref 13–60)
MCH RBC QN AUTO: 29.9 PG (ref 26.5–33)
MCHC RBC AUTO-ENTMCNC: 32.3 G/DL (ref 31.5–36.5)
MCV RBC AUTO: 93 FL (ref 78–100)
PLATELET # BLD AUTO: 207 10E3/UL (ref 150–450)
POTASSIUM SERPL-SCNC: 4.2 MMOL/L (ref 3.4–5.3)
PROT SERPL-MCNC: 7.5 G/DL (ref 6.4–8.3)
RBC # BLD AUTO: 4.65 10E6/UL (ref 3.8–5.2)
SODIUM SERPL-SCNC: 141 MMOL/L (ref 136–145)
TROPONIN T SERPL HS-MCNC: <6 NG/L
WBC # BLD AUTO: 5.7 10E3/UL (ref 4–11)

## 2022-10-31 PROCEDURE — 250N000009 HC RX 250: Performed by: EMERGENCY MEDICINE

## 2022-10-31 PROCEDURE — 84450 TRANSFERASE (AST) (SGOT): CPT | Performed by: EMERGENCY MEDICINE

## 2022-10-31 PROCEDURE — 84703 CHORIONIC GONADOTROPIN ASSAY: CPT | Performed by: EMERGENCY MEDICINE

## 2022-10-31 PROCEDURE — 99284 EMERGENCY DEPT VISIT MOD MDM: CPT | Mod: 25

## 2022-10-31 PROCEDURE — 36415 COLL VENOUS BLD VENIPUNCTURE: CPT | Performed by: EMERGENCY MEDICINE

## 2022-10-31 PROCEDURE — 96374 THER/PROPH/DIAG INJ IV PUSH: CPT

## 2022-10-31 PROCEDURE — 250N000013 HC RX MED GY IP 250 OP 250 PS 637: Performed by: EMERGENCY MEDICINE

## 2022-10-31 PROCEDURE — 250N000011 HC RX IP 250 OP 636: Performed by: EMERGENCY MEDICINE

## 2022-10-31 PROCEDURE — 84484 ASSAY OF TROPONIN QUANT: CPT | Performed by: EMERGENCY MEDICINE

## 2022-10-31 PROCEDURE — C9113 INJ PANTOPRAZOLE SODIUM, VIA: HCPCS | Performed by: EMERGENCY MEDICINE

## 2022-10-31 PROCEDURE — 258N000003 HC RX IP 258 OP 636: Performed by: EMERGENCY MEDICINE

## 2022-10-31 PROCEDURE — 96361 HYDRATE IV INFUSION ADD-ON: CPT

## 2022-10-31 PROCEDURE — 85027 COMPLETE CBC AUTOMATED: CPT | Performed by: EMERGENCY MEDICINE

## 2022-10-31 PROCEDURE — 83690 ASSAY OF LIPASE: CPT | Performed by: EMERGENCY MEDICINE

## 2022-10-31 RX ORDER — SUCRALFATE 1 G/1
1 TABLET ORAL 4 TIMES DAILY
Qty: 56 TABLET | Refills: 0 | Status: SHIPPED | OUTPATIENT
Start: 2022-10-31 | End: 2022-11-14

## 2022-10-31 RX ORDER — OXYCODONE HYDROCHLORIDE 5 MG/1
5 TABLET ORAL EVERY 4 HOURS PRN
Qty: 6 TABLET | Refills: 0 | Status: SHIPPED | OUTPATIENT
Start: 2022-10-31 | End: 2022-11-03

## 2022-10-31 RX ORDER — HYDROMORPHONE HYDROCHLORIDE 1 MG/ML
0.5 INJECTION, SOLUTION INTRAMUSCULAR; INTRAVENOUS; SUBCUTANEOUS ONCE
Status: DISCONTINUED | OUTPATIENT
Start: 2022-10-31 | End: 2022-11-01 | Stop reason: HOSPADM

## 2022-10-31 RX ORDER — SODIUM CHLORIDE 9 MG/ML
INJECTION, SOLUTION INTRAVENOUS CONTINUOUS
Status: DISCONTINUED | OUTPATIENT
Start: 2022-10-31 | End: 2022-11-01 | Stop reason: HOSPADM

## 2022-10-31 RX ADMIN — ALUMINUM HYDROXIDE, MAGNESIUM HYDROXIDE, AND DIMETHICONE 30 ML: 200; 20; 200 SUSPENSION ORAL at 23:22

## 2022-10-31 RX ADMIN — PANTOPRAZOLE SODIUM 40 MG: 40 INJECTION, POWDER, FOR SOLUTION INTRAVENOUS at 23:22

## 2022-10-31 RX ADMIN — SODIUM CHLORIDE 1000 ML: 9 INJECTION, SOLUTION INTRAVENOUS at 23:14

## 2022-10-31 ASSESSMENT — ENCOUNTER SYMPTOMS
ABDOMINAL PAIN: 1
VOMITING: 0
FEVER: 0
DIARRHEA: 0

## 2022-10-31 ASSESSMENT — ACTIVITIES OF DAILY LIVING (ADL): ADLS_ACUITY_SCORE: 35

## 2022-11-01 VITALS
SYSTOLIC BLOOD PRESSURE: 104 MMHG | BODY MASS INDEX: 25.06 KG/M2 | OXYGEN SATURATION: 100 % | TEMPERATURE: 98 F | DIASTOLIC BLOOD PRESSURE: 65 MMHG | RESPIRATION RATE: 16 BRPM | HEART RATE: 75 BPM | WEIGHT: 160 LBS

## 2022-11-01 LAB
HCG SERPL QL: NEGATIVE
HOLD SPECIMEN: NORMAL

## 2022-11-01 RX ORDER — ONDANSETRON 4 MG/1
4 TABLET, ORALLY DISINTEGRATING ORAL EVERY 8 HOURS PRN
Qty: 10 TABLET | Refills: 0 | Status: SHIPPED | OUTPATIENT
Start: 2022-11-01 | End: 2022-11-04

## 2022-11-01 NOTE — ED TRIAGE NOTES
Pt. Presents to ED with complaints of upper abdomen/epigastric pain that started last night along with dizziness and feeling like she is going to pass out. Pt. Denies CP, SOB, fevers, diarrhea, constipation, dysuria. Reports some nausea without vomiting. AVSS on RA.      Triage Assessment     Row Name 10/31/22 1954       Triage Assessment (Adult)    Airway WDL WDL       Respiratory WDL    Respiratory WDL WDL       Skin Circulation/Temperature WDL    Skin Circulation/Temperature WDL WDL       Cardiac WDL    Cardiac WDL WDL       Peripheral/Neurovascular WDL    Peripheral Neurovascular WDL WDL       Cognitive/Neuro/Behavioral WDL    Cognitive/Neuro/Behavioral WDL WDL               63.5

## 2022-11-01 NOTE — ED PROVIDER NOTES
History   Chief Complaint:  Abdominal Pain       The history is provided by the patient.  used: Tongan.      Lorna Kenyon is a 37 year old female who presents with upper abdominal pain for the past two days. She says that she had a cholecystectomy in 2016. The patient denies fever, nausea or vomiting. Denies dysuria. She voiced that she wanted to check for pregnancy.  No chest pain. No ibuprofen or alcohol use.    Review of Systems   Constitutional: Negative for fever.   Gastrointestinal: Positive for abdominal pain. Negative for diarrhea and vomiting.   All other systems reviewed and are negative.    Allergies:  The patient has no known allergies.     Medications:  Cyclobenzaprine  Oxycodone  Famotidine  Norco    Past Medical History:          Past Surgical History:      Cholecystectomy      Social History:  The patient presents to the ED alone.  Patient's primary language is Malian, speaks some English.     Physical Exam     Patient Vitals for the past 24 hrs:   BP Temp Temp src Pulse Resp SpO2 Weight   10/31/22 1951 101/78 98  F (36.7  C) Temporal 75 16 100 % 72.6 kg (160 lb)     Physical Exam  VS: Reviewed per above  HENT: normal speech  EYES: sclera anicteric  CV: Rate as noted, regular rhythm.   RESP: Effort normal. Breath sounds are normal bilaterally.  GI: moderate upper abdominal tenderness without rebound/guarding, not distended.  NEURO: Alert, moving all extremities  MSK: No deformity of the extremities  SKIN: Warm and dry      Emergency Department Course     Laboratory:  Labs Ordered and Resulted from Time of ED Arrival to Time of ED Departure   COMPREHENSIVE METABOLIC PANEL - Normal       Result Value    Sodium 141      Potassium 4.2      Chloride 105      Carbon Dioxide (CO2) 27      Anion Gap 9      Urea Nitrogen 10.9      Creatinine 0.60      Calcium 9.2      Glucose 99      Alkaline Phosphatase 93      AST 21      ALT 10      Protein Total 7.5      Albumin  4.3      Bilirubin Total 0.2      GFR Estimate >90     CBC WITH PLATELETS - Normal    WBC Count 5.7      RBC Count 4.65      Hemoglobin 13.9      Hematocrit 43.0      MCV 93      MCH 29.9      MCHC 32.3      RDW 11.9      Platelet Count 207     LIPASE - Normal    Lipase 24     TROPONIN T, HIGH SENSITIVITY - Normal    Troponin T, High Sensitivity <6     HCG QUALITATIVE PREGNANCY - Normal    hCG Serum Qualitative Negative        Emergency Department Course:     Reviewed:  I reviewed nursing notes, vitals, past medical history and Care Everywhere    Assessments:  2259 I obtained history and examined the patient as noted above.   2336 I rechecked the patient and explained findings.     Interventions:  Medications   0.9% sodium chloride BOLUS (1,000 mLs Intravenous New Bag 10/31/22 2314)     Followed by   sodium chloride 0.9% infusion (has no administration in time range)   HYDROmorphone (PF) (DILAUDID) injection 0.5 mg (has no administration in time range)   lidocaine (viscous) (XYLOCAINE) 2 % 15 mL, alum & mag hydroxide-simethicone (MAALOX) 15 mL GI Cocktail (30 mLs Oral Given 10/31/22 2322)   pantoprazole (PROTONIX) IV push injection 40 mg (40 mg Intravenous Given 10/31/22 2322)     Disposition:  The patient was discharged to home.     Impression & Plan     Medical Decision Making:  Patient presents to the ER for evaluation of upper abdominal pain x2 days.  Vital signs reassuring.  She has moderate upper abdominal tenderness without peritoneal signs.  Patient has history of cholecystectomy and reassuring LFTs today.  Low suspicion for choledocholithiasis/cholangitis.  After GI cocktail and Protonix her symptoms had improved.  Suspect esophagitis versus peptic ulcer disease versus gastritis.  Labs reassuring without evidence of pregnancy or pancreatitis or concerning leukocytosis.  Plan for discharge with PPI, Carafate, as needed Zofran and a few pills of as needed oxycodone for severe pain and follow-up in primary  care.  Return precautions discussed prior to discharge.      Diagnosis:    ICD-10-CM    1. Epigastric pain  R10.13           Discharge Medications:  New Prescriptions    OMEPRAZOLE (PRILOSEC) 20 MG DR CAPSULE    Take 2 capsules (40 mg) by mouth 2 times daily for 30 days    OXYCODONE (ROXICODONE) 5 MG TABLET    Take 1 tablet (5 mg) by mouth every 4 hours as needed for severe pain    SUCRALFATE (CARAFATE) 1 GM TABLET    Take 1 tablet (1 g) by mouth 4 times daily for 14 days       Scribe Disclosure:  I, Ghassan No, am serving as a scribe at 10:59 PM on 10/31/2022 to document services personally performed by Eddy Gaines MD based on my observations and the provider's statements to me.              Eddy Gaines MD  11/01/22 0016

## 2022-11-01 NOTE — ED NOTES
used and attempted to explain EKG. Pt declined EKG insisting that she only has abdominal pain with nausea.

## 2023-06-28 ENCOUNTER — HOSPITAL ENCOUNTER (EMERGENCY)
Facility: CLINIC | Age: 38
Discharge: HOME | End: 2023-06-28
Attending: EMERGENCY MEDICINE | Admitting: EMERGENCY MEDICINE
Payer: COMMERCIAL

## 2023-06-28 VITALS
RESPIRATION RATE: 16 BRPM | TEMPERATURE: 97.2 F | SYSTOLIC BLOOD PRESSURE: 98 MMHG | DIASTOLIC BLOOD PRESSURE: 68 MMHG | OXYGEN SATURATION: 100 % | HEART RATE: 74 BPM

## 2023-06-28 DIAGNOSIS — R52 GENERALIZED BODY ACHES: ICD-10-CM

## 2023-06-28 DIAGNOSIS — R42 LIGHT HEADED: ICD-10-CM

## 2023-06-28 LAB
ALBUMIN UR-MCNC: NEGATIVE MG/DL
AMORPH CRY #/AREA URNS HPF: ABNORMAL /HPF
ANION GAP SERPL CALCULATED.3IONS-SCNC: 8 MMOL/L (ref 7–15)
APPEARANCE UR: ABNORMAL
BASOPHILS # BLD AUTO: 0 10E3/UL (ref 0–0.2)
BASOPHILS NFR BLD AUTO: 1 %
BILIRUB UR QL STRIP: NEGATIVE
BUN SERPL-MCNC: 15.8 MG/DL (ref 6–20)
CALCIUM SERPL-MCNC: 9.3 MG/DL (ref 8.6–10)
CHLORIDE SERPL-SCNC: 104 MMOL/L (ref 98–107)
COLOR UR AUTO: YELLOW
CREAT SERPL-MCNC: 0.66 MG/DL (ref 0.51–0.95)
DEPRECATED HCO3 PLAS-SCNC: 26 MMOL/L (ref 22–29)
EOSINOPHIL # BLD AUTO: 0.2 10E3/UL (ref 0–0.7)
EOSINOPHIL NFR BLD AUTO: 4 %
ERYTHROCYTE [DISTWIDTH] IN BLOOD BY AUTOMATED COUNT: 12.1 % (ref 10–15)
GFR SERPL CREATININE-BSD FRML MDRD: >90 ML/MIN/1.73M2
GLUCOSE SERPL-MCNC: 110 MG/DL (ref 70–99)
GLUCOSE UR STRIP-MCNC: NEGATIVE MG/DL
HCG SERPL QL: NEGATIVE
HCT VFR BLD AUTO: 39 % (ref 35–47)
HGB BLD-MCNC: 13 G/DL (ref 11.7–15.7)
HGB UR QL STRIP: NEGATIVE
IMM GRANULOCYTES # BLD: 0 10E3/UL
IMM GRANULOCYTES NFR BLD: 0 %
KETONES UR STRIP-MCNC: NEGATIVE MG/DL
LEUKOCYTE ESTERASE UR QL STRIP: NEGATIVE
LYMPHOCYTES # BLD AUTO: 2.1 10E3/UL (ref 0.8–5.3)
LYMPHOCYTES NFR BLD AUTO: 36 %
MCH RBC QN AUTO: 30.4 PG (ref 26.5–33)
MCHC RBC AUTO-ENTMCNC: 33.3 G/DL (ref 31.5–36.5)
MCV RBC AUTO: 91 FL (ref 78–100)
MONOCYTES # BLD AUTO: 0.6 10E3/UL (ref 0–1.3)
MONOCYTES NFR BLD AUTO: 10 %
MUCOUS THREADS #/AREA URNS LPF: PRESENT /LPF
NEUTROPHILS # BLD AUTO: 2.8 10E3/UL (ref 1.6–8.3)
NEUTROPHILS NFR BLD AUTO: 49 %
NITRATE UR QL: NEGATIVE
NRBC # BLD AUTO: 0 10E3/UL
NRBC BLD AUTO-RTO: 0 /100
PH UR STRIP: 6.5 [PH] (ref 5–7)
PLATELET # BLD AUTO: 171 10E3/UL (ref 150–450)
POTASSIUM SERPL-SCNC: 4.3 MMOL/L (ref 3.4–5.3)
RBC # BLD AUTO: 4.28 10E6/UL (ref 3.8–5.2)
RBC URINE: 1 /HPF
SODIUM SERPL-SCNC: 138 MMOL/L (ref 136–145)
SP GR UR STRIP: 1.03 (ref 1–1.03)
SQUAMOUS EPITHELIAL: 4 /HPF
UROBILINOGEN UR STRIP-MCNC: NORMAL MG/DL
WBC # BLD AUTO: 5.7 10E3/UL (ref 4–11)
WBC URINE: 1 /HPF

## 2023-06-28 PROCEDURE — 99284 EMERGENCY DEPT VISIT MOD MDM: CPT

## 2023-06-28 PROCEDURE — 85025 COMPLETE CBC W/AUTO DIFF WBC: CPT | Performed by: STUDENT IN AN ORGANIZED HEALTH CARE EDUCATION/TRAINING PROGRAM

## 2023-06-28 PROCEDURE — 93005 ELECTROCARDIOGRAM TRACING: CPT

## 2023-06-28 PROCEDURE — 84703 CHORIONIC GONADOTROPIN ASSAY: CPT | Performed by: STUDENT IN AN ORGANIZED HEALTH CARE EDUCATION/TRAINING PROGRAM

## 2023-06-28 PROCEDURE — 82310 ASSAY OF CALCIUM: CPT | Performed by: STUDENT IN AN ORGANIZED HEALTH CARE EDUCATION/TRAINING PROGRAM

## 2023-06-28 PROCEDURE — 36415 COLL VENOUS BLD VENIPUNCTURE: CPT | Performed by: STUDENT IN AN ORGANIZED HEALTH CARE EDUCATION/TRAINING PROGRAM

## 2023-06-28 PROCEDURE — 250N000013 HC RX MED GY IP 250 OP 250 PS 637: Performed by: STUDENT IN AN ORGANIZED HEALTH CARE EDUCATION/TRAINING PROGRAM

## 2023-06-28 PROCEDURE — 81001 URINALYSIS AUTO W/SCOPE: CPT | Performed by: STUDENT IN AN ORGANIZED HEALTH CARE EDUCATION/TRAINING PROGRAM

## 2023-06-28 RX ORDER — ACETAMINOPHEN 500 MG
1000 TABLET ORAL ONCE
Status: COMPLETED | OUTPATIENT
Start: 2023-06-28 | End: 2023-06-28

## 2023-06-28 RX ORDER — IBUPROFEN 600 MG/1
600 TABLET, FILM COATED ORAL ONCE
Status: COMPLETED | OUTPATIENT
Start: 2023-06-28 | End: 2023-06-28

## 2023-06-28 RX ADMIN — ACETAMINOPHEN 1000 MG: 500 TABLET, FILM COATED ORAL at 19:04

## 2023-06-28 RX ADMIN — IBUPROFEN 600 MG: 600 TABLET, FILM COATED ORAL at 20:54

## 2023-06-28 ASSESSMENT — ACTIVITIES OF DAILY LIVING (ADL)
ADLS_ACUITY_SCORE: 37
ADLS_ACUITY_SCORE: 37

## 2023-06-29 LAB
ATRIAL RATE - MUSE: 78 BPM
DIASTOLIC BLOOD PRESSURE - MUSE: NORMAL MMHG
INTERPRETATION ECG - MUSE: NORMAL
P AXIS - MUSE: 68 DEGREES
PR INTERVAL - MUSE: 142 MS
QRS DURATION - MUSE: 84 MS
QT - MUSE: 378 MS
QTC - MUSE: 430 MS
R AXIS - MUSE: 13 DEGREES
SYSTOLIC BLOOD PRESSURE - MUSE: NORMAL MMHG
T AXIS - MUSE: 7 DEGREES
VENTRICULAR RATE- MUSE: 78 BPM

## 2023-06-29 NOTE — DISCHARGE INSTRUCTIONS
Take Tylenol ibuprofen for your body aches.  Follow-up with primary care in the next 2 to 3 days for recheck.  Return to the emergency department for fevers, vomiting, worsening pain, neurologic changes, or any other concerns.

## 2023-06-29 NOTE — ED PROVIDER NOTES
ED ATTENDING PHYSICIAN NOTE:   I evaluated this patient in conjunction with HO Jose. I have participated in the care of the patient and personally performed key elements of the history, exam, and medical decision making.      HPI:   Lorna Kenyon is a 38 year old female who presents with generalized myalgias, dizziness, headache, and weakness for the past week. Alongside this, she notes some back pain and abdominal pain. No cough, vomiting, diarrhea, sick contacts, congestion, sore throat, nausea, vision changes, fever, chills, appetite changes, ear pain, tinnitus, or dysuria. The patient notes that she is not currently pregnant, but states that she has not had her menstrual period for the last month.     EXAM:   BP 98/68   Pulse 74   Temp 97.2  F (36.2  C)   Resp 16   SpO2 100%   General: Alert, appears well-developed and well-nourished. Cooperative.     In mild distress  HEENT:  Head:  Atraumatic  Ears:  External ears are normal  Mouth/Throat:  Oropharynx is without erythema or exudate and mucous membranes are moist.   Eyes:   Conjunctivae normal and EOM are normal. No scleral icterus.  CV:  Normal rate, regular rhythm, normal heart sounds and radial pulses are 2+ and symmetric.  No murmur.  Resp:  Breath sounds are clear bilaterally    Non-labored, no retractions or accessory muscle use  GI:  Abdomen is soft, no distension, no tenderness. No rebound or guarding.  No CVA tenderness bilaterally  MS:  Normal range of motion. No edema.    Back atraumatic.    No midline cervical, thoracic, or lumbar tenderness  Skin:  Warm and dry.  No rash or lesions noted.  Neuro: Alert. Normal strength.  GCS: 15  Psych:  Normal mood and affect.    Labs Ordered and Resulted from Time of ED Arrival to Time of ED Departure   ROUTINE UA WITH MICROSCOPIC REFLEX TO CULTURE - Abnormal       Result Value    Color Urine Yellow      Appearance Urine Slightly Cloudy (*)     Glucose Urine Negative      Bilirubin Urine Negative       Ketones Urine Negative      Specific Gravity Urine 1.026      Blood Urine Negative      pH Urine 6.5      Protein Albumin Urine Negative      Urobilinogen Urine Normal      Nitrite Urine Negative      Leukocyte Esterase Urine Negative      Mucus Urine Present (*)     Amorphous Crystals Urine Few (*)     RBC Urine 1      WBC Urine 1      Squamous Epithelials Urine 4 (*)    BASIC METABOLIC PANEL - Abnormal    Sodium 138      Potassium 4.3      Chloride 104      Carbon Dioxide (CO2) 26      Anion Gap 8      Urea Nitrogen 15.8      Creatinine 0.66      Calcium 9.3      Glucose 110 (*)     GFR Estimate >90     HCG QUALITATIVE PREGNANCY - Normal    hCG Serum Qualitative Negative     CBC WITH PLATELETS AND DIFFERENTIAL    WBC Count 5.7      RBC Count 4.28      Hemoglobin 13.0      Hematocrit 39.0      MCV 91      MCH 30.4      MCHC 33.3      RDW 12.1      Platelet Count 171      % Neutrophils 49      % Lymphocytes 36      % Monocytes 10      % Eosinophils 4      % Basophils 1      % Immature Granulocytes 0      NRBCs per 100 WBC 0      Absolute Neutrophils 2.8      Absolute Lymphocytes 2.1      Absolute Monocytes 0.6      Absolute Eosinophils 0.2      Absolute Basophils 0.0      Absolute Immature Granulocytes 0.0      Absolute NRBCs 0.0       No orders to display     MEDICAL DECISION MAKING/ASSESSMENT AND PLAN:   Patient is a 38-year-old female who presents with generalized body aches, headache, and weakness.  Patient had no focal exam findings concerning for sinister intrathoracic or intra-abdominal processes.  She has been afebrile with stable vital signs here.  Thankfully electrolytes appear normal.  No evidence of anemia or leukocytosis.  Patient is not pregnant.  Urinalysis unremarkable.  Patient felt mild improvement after Tylenol and ibuprofen.  Unclear the etiology of the patient's weakness and generalized body aches but no sinister etiologies requiring further emergent evaluation or hospitalization at this  time.  Close follow-up encouraged with primary care in the next 1 week for recheck.  Return precautions understood, discharged home.     DIAGNOSIS:     ICD-10-CM    1. Generalized body aches  R52       2. Light headed  R42           DISPOSITION:   Discharged home.     6/28/2023  [facility] EMERGENCY DEPT       Jose Maki MD  06/29/23 0102
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
## 2023-09-17 ENCOUNTER — HOSPITAL ENCOUNTER (EMERGENCY)
Facility: CLINIC | Age: 38
Discharge: HOME | End: 2023-09-17
Attending: EMERGENCY MEDICINE | Admitting: EMERGENCY MEDICINE
Payer: COMMERCIAL

## 2023-09-17 VITALS
TEMPERATURE: 97.1 F | DIASTOLIC BLOOD PRESSURE: 78 MMHG | SYSTOLIC BLOOD PRESSURE: 106 MMHG | OXYGEN SATURATION: 100 % | RESPIRATION RATE: 16 BRPM | HEART RATE: 80 BPM

## 2023-09-17 DIAGNOSIS — M54.50 ACUTE RIGHT-SIDED LOW BACK PAIN WITHOUT SCIATICA: ICD-10-CM

## 2023-09-17 LAB
ALBUMIN UR-MCNC: 20 MG/DL
APPEARANCE UR: ABNORMAL
BILIRUB UR QL STRIP: NEGATIVE
COLOR UR AUTO: YELLOW
GLUCOSE UR STRIP-MCNC: NEGATIVE MG/DL
HCG UR QL: NEGATIVE
HGB UR QL STRIP: NEGATIVE
KETONES UR STRIP-MCNC: NEGATIVE MG/DL
LEUKOCYTE ESTERASE UR QL STRIP: NEGATIVE
MUCOUS THREADS #/AREA URNS LPF: PRESENT /LPF
NITRATE UR QL: NEGATIVE
PH UR STRIP: 6.5 [PH] (ref 5–7)
RBC URINE: 1 /HPF
SP GR UR STRIP: 1.03 (ref 1–1.03)
SPERM #/AREA URNS HPF: PRESENT /HPF
SQUAMOUS EPITHELIAL: 9 /HPF
UROBILINOGEN UR STRIP-MCNC: NORMAL MG/DL
WBC URINE: 2 /HPF

## 2023-09-17 PROCEDURE — 81025 URINE PREGNANCY TEST: CPT | Performed by: EMERGENCY MEDICINE

## 2023-09-17 PROCEDURE — 99283 EMERGENCY DEPT VISIT LOW MDM: CPT

## 2023-09-17 PROCEDURE — 81001 URINALYSIS AUTO W/SCOPE: CPT | Performed by: EMERGENCY MEDICINE

## 2023-09-17 RX ORDER — CYCLOBENZAPRINE HCL 10 MG
10 TABLET ORAL 3 TIMES DAILY PRN
Qty: 18 TABLET | Refills: 0 | Status: SHIPPED | OUTPATIENT
Start: 2023-09-17 | End: 2023-09-23

## 2023-09-17 ASSESSMENT — ACTIVITIES OF DAILY LIVING (ADL): ADLS_ACUITY_SCORE: 35

## 2023-09-17 NOTE — ED TRIAGE NOTES
Patient presents to the ED reporting low back pain x 1 week. Denies known injury. Also reports has missed her period for the past 2 months.

## 2023-09-17 NOTE — ED PROVIDER NOTES
History     Chief Complaint:  Back Pain       The history is provided by the patient. A  was used (son).      Lorna Kenyon is a 38 year old female who presents with lower back pain. She started having lower back pain around a week ago. She gets the pain when she stands up after sitting for a while. Pain is not worse with movements. She also has lower abdominal pain but not now. She has been eating and drinking fine. She has not been getting her period for the last couple of months. Denies leg pain, numbness, weakness, tingling, or urinary/stool issues.      Independent Historian:   None - Patient Only    Review of External Notes:   None       Medications:    Flexeril  Roxicodone  Senokot  Norco  Augmentin  Naproxen  Colace  Claritin    Past Medical History:    Gestational diabetes  Cardiac murmur  Allergic rhinitis  Vitamin D deficiency  GERD  Group B Streptococcus UTI  Ovarian cyst  Hyperemesis gravidarum    Past Surgical History:     x2  Laparoscopic cholecystectomy      Physical Exam   Patient Vitals for the past 24 hrs:   BP Temp Temp src Pulse Resp SpO2   23 0830 106/78 97.1  F (36.2  C) Temporal 80 16 100 %        Physical Exam  Constitutional: Well appearing.  HEENT: Atraumatic. Moist mucous membranes.  Neck: Soft.  Supple.   Cardiac: Regular rate and rhythm.  No murmur or rub.  Respiratory: Clear to auscultation bilaterally.  No respiratory distress.  No wheezing, rhonchi, or rales.  Abdomen: Soft and nontender.  No guarding.  Nondistended.  Musculoskeletal: Tenderness to palpation of the right lumbar paraspinal muscle area. No edema.  Normal range of motion.  Neurologic: Alert and oriented x3.  Normal tone and bulk.  5/5 strength in bilateral lower extremities.  Sensation to light touch intact throughout.  Normal gait.  Skin: No rashes.  No edema.  Psych: Normal affect.  Normal behavior.    Emergency Department Course   Laboratory:  Labs Ordered and Resulted from  Time of ED Arrival to Time of ED Departure   ROUTINE UA WITH MICROSCOPIC REFLEX TO CULTURE - Abnormal       Result Value    Color Urine Yellow      Appearance Urine Slightly Cloudy (*)     Glucose Urine Negative      Bilirubin Urine Negative      Ketones Urine Negative      Specific Gravity Urine 1.026      Blood Urine Negative      pH Urine 6.5      Protein Albumin Urine 20 (*)     Urobilinogen Urine Normal      Nitrite Urine Negative      Leukocyte Esterase Urine Negative      Mucus Urine Present (*)     Sperm Urine Present (*)     RBC Urine 1      WBC Urine 2      Squamous Epithelials Urine 9 (*)    HCG QUALITATIVE URINE - Normal    hCG Urine Qualitative Negative          Emergency Department Course & Assessments:    Interventions:  Medications - No data to display     Assessments:  0927 I examined the patient and obtained history as noted above.    Independent Interpretation (X-rays, CTs, rhythm strip):  None    Consultations/Discussion of Management or Tests:  None    Social Determinants of Health affecting care:   None    Disposition:  The patient was discharged to home.     Impression & Plan    Medical Decision Making:  Lorna Kenyon is a 38-year-old woman who is afebrile and hemodynamically stable.  She is neurologically intact with no focal deficits.  I see no negation for MRI to rule out infectious or inflammatory condition such as cauda equina syndrome, epidural abscess, or other.  She is up and moving without any difficulty.  There is no trauma.  There is no abdominal pain.  I see no indication for any further imaging as this seems most likely musculoskeletal it is reproducible in nature.  Recommend close primary care follow-up to ensure resolution and further evaluation if not improving and she is in agreement feels, with this plan.  She declined the need for any pain medication here but will go home with a muscle relaxer.  Discussed supportive care at home and strict return precautions were given.   Questions were answered and she was in no distress at time of discharge.      Diagnosis:    ICD-10-CM    1. Acute right-sided low back pain without sciatica  M54.50            Discharge Medications:  New Prescriptions    CYCLOBENZAPRINE (FLEXERIL) 10 MG TABLET    Take 1 tablet (10 mg) by mouth 3 times daily as needed for muscle spasms          Scribe Disclosure:  I, Rogelio Hamm, am serving as a scribe at 9:25 AM on 9/17/2023 to document services personally performed by Deshawn Saavedra MD based on my observations and the provider's statements to me.     9/17/2023   Deshawn Saavedra MD Salay, Nicholas J, MD  09/27/23 0974

## 2023-09-22 ENCOUNTER — HOSPITAL ENCOUNTER (EMERGENCY)
Facility: CLINIC | Age: 38
Discharge: HOME | End: 2023-09-22
Attending: EMERGENCY MEDICINE | Admitting: EMERGENCY MEDICINE
Payer: COMMERCIAL

## 2023-09-22 ENCOUNTER — APPOINTMENT (OUTPATIENT)
Dept: CT IMAGING | Facility: CLINIC | Age: 38
End: 2023-09-22
Attending: EMERGENCY MEDICINE
Payer: COMMERCIAL

## 2023-09-22 VITALS
SYSTOLIC BLOOD PRESSURE: 95 MMHG | HEART RATE: 82 BPM | RESPIRATION RATE: 16 BRPM | DIASTOLIC BLOOD PRESSURE: 67 MMHG | TEMPERATURE: 97.1 F | OXYGEN SATURATION: 100 %

## 2023-09-22 DIAGNOSIS — R11.2 NAUSEA AND VOMITING, UNSPECIFIED VOMITING TYPE: ICD-10-CM

## 2023-09-22 DIAGNOSIS — U07.1 COVID-19: ICD-10-CM

## 2023-09-22 LAB
ANION GAP SERPL CALCULATED.3IONS-SCNC: 10 MMOL/L (ref 7–15)
BASOPHILS # BLD AUTO: 0 10E3/UL (ref 0–0.2)
BASOPHILS NFR BLD AUTO: 0 %
BUN SERPL-MCNC: 9.6 MG/DL (ref 6–20)
CALCIUM SERPL-MCNC: 9.1 MG/DL (ref 8.6–10)
CHLORIDE SERPL-SCNC: 104 MMOL/L (ref 98–107)
CREAT SERPL-MCNC: 0.63 MG/DL (ref 0.51–0.95)
DEPRECATED HCO3 PLAS-SCNC: 25 MMOL/L (ref 22–29)
EGFRCR SERPLBLD CKD-EPI 2021: >90 ML/MIN/1.73M2
EOSINOPHIL # BLD AUTO: 0.2 10E3/UL (ref 0–0.7)
EOSINOPHIL NFR BLD AUTO: 3 %
ERYTHROCYTE [DISTWIDTH] IN BLOOD BY AUTOMATED COUNT: 12.5 % (ref 10–15)
FLUAV RNA SPEC QL NAA+PROBE: NEGATIVE
FLUBV RNA RESP QL NAA+PROBE: NEGATIVE
GLUCOSE SERPL-MCNC: 82 MG/DL (ref 70–99)
HCG SERPL QL: NEGATIVE
HCT VFR BLD AUTO: 41.9 % (ref 35–47)
HGB BLD-MCNC: 14.2 G/DL (ref 11.7–15.7)
HOLD SPECIMEN: NORMAL
IMM GRANULOCYTES # BLD: 0 10E3/UL
IMM GRANULOCYTES NFR BLD: 0 %
LIPASE SERPL-CCNC: 16 U/L (ref 13–60)
LYMPHOCYTES # BLD AUTO: 1.3 10E3/UL (ref 0.8–5.3)
LYMPHOCYTES NFR BLD AUTO: 28 %
MCH RBC QN AUTO: 30.4 PG (ref 26.5–33)
MCHC RBC AUTO-ENTMCNC: 33.9 G/DL (ref 31.5–36.5)
MCV RBC AUTO: 90 FL (ref 78–100)
MONOCYTES # BLD AUTO: 0.5 10E3/UL (ref 0–1.3)
MONOCYTES NFR BLD AUTO: 11 %
NEUTROPHILS # BLD AUTO: 2.7 10E3/UL (ref 1.6–8.3)
NEUTROPHILS NFR BLD AUTO: 58 %
NRBC # BLD AUTO: 0 10E3/UL
NRBC BLD AUTO-RTO: 0 /100
PLATELET # BLD AUTO: 173 10E3/UL (ref 150–450)
POTASSIUM SERPL-SCNC: 3.9 MMOL/L (ref 3.4–5.3)
RBC # BLD AUTO: 4.67 10E6/UL (ref 3.8–5.2)
RSV RNA SPEC NAA+PROBE: NEGATIVE
SARS-COV-2 RNA RESP QL NAA+PROBE: POSITIVE
SODIUM SERPL-SCNC: 139 MMOL/L (ref 136–145)
WBC # BLD AUTO: 4.7 10E3/UL (ref 4–11)

## 2023-09-22 PROCEDURE — 258N000003 HC RX IP 258 OP 636: Performed by: EMERGENCY MEDICINE

## 2023-09-22 PROCEDURE — 36415 COLL VENOUS BLD VENIPUNCTURE: CPT | Performed by: EMERGENCY MEDICINE

## 2023-09-22 PROCEDURE — 84703 CHORIONIC GONADOTROPIN ASSAY: CPT | Performed by: EMERGENCY MEDICINE

## 2023-09-22 PROCEDURE — 99285 EMERGENCY DEPT VISIT HI MDM: CPT | Mod: 25

## 2023-09-22 PROCEDURE — 87637 SARSCOV2&INF A&B&RSV AMP PRB: CPT | Performed by: EMERGENCY MEDICINE

## 2023-09-22 PROCEDURE — 83690 ASSAY OF LIPASE: CPT | Performed by: EMERGENCY MEDICINE

## 2023-09-22 PROCEDURE — 250N000009 HC RX 250: Performed by: EMERGENCY MEDICINE

## 2023-09-22 PROCEDURE — 85025 COMPLETE CBC W/AUTO DIFF WBC: CPT | Performed by: EMERGENCY MEDICINE

## 2023-09-22 PROCEDURE — 250N000011 HC RX IP 250 OP 636: Performed by: EMERGENCY MEDICINE

## 2023-09-22 PROCEDURE — 96361 HYDRATE IV INFUSION ADD-ON: CPT

## 2023-09-22 PROCEDURE — 80048 BASIC METABOLIC PNL TOTAL CA: CPT | Performed by: EMERGENCY MEDICINE

## 2023-09-22 PROCEDURE — 250N000011 HC RX IP 250 OP 636: Mod: JZ | Performed by: EMERGENCY MEDICINE

## 2023-09-22 PROCEDURE — 74177 CT ABD & PELVIS W/CONTRAST: CPT

## 2023-09-22 PROCEDURE — 96374 THER/PROPH/DIAG INJ IV PUSH: CPT | Mod: 59

## 2023-09-22 RX ORDER — ONDANSETRON 4 MG/1
4 TABLET, ORALLY DISINTEGRATING ORAL ONCE
Status: DISCONTINUED | OUTPATIENT
Start: 2023-09-22 | End: 2023-09-22 | Stop reason: HOSPADM

## 2023-09-22 RX ORDER — KETOROLAC TROMETHAMINE 15 MG/ML
15 INJECTION, SOLUTION INTRAMUSCULAR; INTRAVENOUS ONCE
Status: COMPLETED | OUTPATIENT
Start: 2023-09-22 | End: 2023-09-22

## 2023-09-22 RX ORDER — IOPAMIDOL 755 MG/ML
500 INJECTION, SOLUTION INTRAVASCULAR ONCE
Status: COMPLETED | OUTPATIENT
Start: 2023-09-22 | End: 2023-09-22

## 2023-09-22 RX ORDER — ONDANSETRON 2 MG/ML
4 INJECTION INTRAMUSCULAR; INTRAVENOUS ONCE
Status: COMPLETED | OUTPATIENT
Start: 2023-09-22 | End: 2023-09-22

## 2023-09-22 RX ORDER — ONDANSETRON 4 MG/1
4 TABLET, ORALLY DISINTEGRATING ORAL EVERY 6 HOURS PRN
Qty: 9 TABLET | Refills: 0 | Status: SHIPPED | OUTPATIENT
Start: 2023-09-22 | End: 2023-09-25

## 2023-09-22 RX ADMIN — SODIUM CHLORIDE 60 ML: 9 INJECTION, SOLUTION INTRAVENOUS at 17:51

## 2023-09-22 RX ADMIN — SODIUM CHLORIDE 1000 ML: 9 INJECTION, SOLUTION INTRAVENOUS at 17:18

## 2023-09-22 RX ADMIN — IOPAMIDOL 81 ML: 755 INJECTION, SOLUTION INTRAVENOUS at 17:51

## 2023-09-22 RX ADMIN — KETOROLAC TROMETHAMINE 15 MG: 15 INJECTION INTRAMUSCULAR; INTRAVENOUS at 19:18

## 2023-09-22 RX ADMIN — ONDANSETRON 4 MG: 2 INJECTION INTRAMUSCULAR; INTRAVENOUS at 14:21

## 2023-09-22 RX ADMIN — SODIUM CHLORIDE 1000 ML: 9 INJECTION, SOLUTION INTRAVENOUS at 14:20

## 2023-09-22 ASSESSMENT — ACTIVITIES OF DAILY LIVING (ADL)
ADLS_ACUITY_SCORE: 35
ADLS_ACUITY_SCORE: 37

## 2023-09-22 NOTE — ED NOTES
Pt asked for urine again and pt. Informed writer that she already went and forgot to collect urine. She reports that she is unable to use the bathroom again and will try later.

## 2023-09-22 NOTE — ED NOTES
Writer explained to pt that UA was needed and pt reported she is not able/does not want to give urine right now.

## 2023-09-23 NOTE — DISCHARGE INSTRUCTIONS
Discharge Instructions  COVID-19    COVID-19 is the disease caused by a new coronavirus. The virus spreads from person-to-person primarily by droplets when an infected person coughs or sneezes and the droplets are then breathed in by another person.    Symptoms of COVID-19  Many people have no symptoms or mild symptoms.  Symptoms usually appear within a few days, but up to 14-days, after contact with a person with COVID-19.    A mild COVID-19 illness is like a cold and can have fever, cough, sneezing, sore throat, tiredness, headache, and muscle pain.    A moderate COVID-19 illness might include shortness of breath or pneumonia on a chest x-ray.    A severe COVID-19 illness causes significant breathing problems such as low oxygen levels or more serious pneumonia.  Some patients experience loss of taste or smell which is somewhat unique to COVID-19.      Isolation and Quarantine  Testing is recommended for any person with symptoms that could be COVID-19 and often for those exposed to COVID-19. The best way to stop the spread of the virus is to avoid contact with others.    A close contact exposure is being within 6 feet of someone with COVID for 15 minutes.    Isolation refers to sick people staying away from people who are not sick.    A person in quarantine is limiting activity because they were exposed and are waiting to see if they might become sick.    If you test positive for COVID and have no symptoms, you should stay home (isolation) for 5 full days after the day of the test. You should then wear a mask when around others for another 5 days.    If you test positive for COVID and have mild symptoms, you should stay home (isolation) for at least 5 days after your symptoms began. You can return to normal activities at that time, wearing a mask when around others, for another 5 days as long as your symptoms are improving/resolving and you have been without a fever for 24 hours (without using fever-reducing  medicine).    If you test positive for COVID and have more than mild symptoms, you should stay home (isolation) for at least 10 days after your symptoms began. You can return to normal activities at that time as long as your symptoms are improving and you have been without a fever for 24 hours (without using fever-reducing medicine).  For example, if you have a fever and cough for 6 days, you need to stay home 4 more days with no fever for a total of 10 days. Or, if you have a fever and cough for 10 days, you need to stay home one more day with no fever for a total of 11 days.    If you were exposed to COVID and are not vaccinated (or it has been more than six months from your Pfizer or Moderna vaccine or two months from J&J vaccine), you should stay home (quarantine) for 5 days and then wear a mask around others for 5 additional days. A COVID test at day 5 is recommended.    If you were exposed to COVID and are vaccinated (had a booster, had two shots of Pfizer or Moderna vaccine in the last five months, or had J&J vaccine within two months), you do not need to quarantine but should wear a mask around others for 10 days and get a COVID test on day 5.    If you have symptoms but a negative test, you should stay at home until you have mild/improving symptoms and are without fever for 24 hours, using the same judgment you would for when it is safe to return to work/school from strep throat, influenza, or the common cold. If you worsen, you should consider being re-evaluated.    If you are being tested for COVID because of symptoms and your test is pending, you should stay home until you know your test result.  More details on isolation and quarantine can be found on this website from the CDC:  https://www.cdc.gov/coronavirus/2019-ncov/your-health/quarantine-isolation.html    If I have COVID, how should I protect myself and others?    Do not go to work or school. Have a friend or relative do your shopping. Do not use  public transportation (bus, train) or ridesharing (Lyft, Uber).    Separate yourself from other people in your home. As much as possible, you should stay in one room and away from other people in your home. Also, use a separate bathroom, if possible. Avoid handling pets or other animals while sick.     Wear a facemask if you need to be around other people and cover your mouth and nose with a tissue when you cough or sneeze.     Avoid sharing personal household items. You should not share dishes, drinking glasses, forks/knives/spoons, towels, or bedding with other people in your home. After using these items, they should be washed with soap and water. Clean parts of your home that are touched often (doorknobs, faucets, countertops, etc.) daily.     Wash your hands often with soap and water for at least 20 seconds or use an alcohol-based hand  containing at least 60% alcohol.     Avoid touching your face.    Treat your symptoms. You can take Acetaminophen (Tylenol) to treat body aches and fever as needed for comfort. Ibuprofen (Advil or Motrin) can be used as well if you still have symptoms after taking Tylenol. Drink fluids. Rest.    Watch for worsening symptoms such as shortness of breath/difficulty breathing or very severe weakness.    Employers/workplaces are being asked by the Centers for Disease Control (CDC) to not request notes/documentation for you to return to work or prove that you were ill. You may choose to show your employer this paperwork. Also, repeat testing should not be required to return to work.    Exercise/Sports in rare cases, COVID could affect your heart in a way that makes exercise or participation in sports dangerous.  If you have a mild COVID illness (fever, cough, sore throat, and similar symptoms but no difficulty breathing or abnormalities of the lung): After your COVID symptoms have resolved, wait 14-days before returning to activity.  If you have more than a mild illness  (meaning that you have problems with your breathing or lungs) or if you participate in competitive or strenuous activity or have a history of heart disease: Please see your primary doctor/provider prior to return to activity/competition.    COVID treatments such as antiviral and antibody medications are available. They are recommended for those patients who have a risk for developing more severe COVID illness. Importantly, the treatments must be started early in the illness (within 5-7 days, depending on which treatment). These treatments may have been considered today during your visit. If you have other questions, contact your primary doctor/clinic.     You can learn more about COVID treatments from the [facility]:  https://www.health.[facility]./diseases/coronavirus/meds.html    Return to the Emergency Department if:    If you are developing worsening breathing, shortness of breath, or feel worse you should seek medical attention.  If you are uncertain, contact your health care provider/clinic. If you need emergency medical attention, call 911 and tell them you have been ill.      Discharge Instructions  Vomiting    You have been seen today for vomiting (throwing up). This is usually caused by a virus, but some bacteria, parasites, medicines or other medical conditions can cause similar symptoms. At this time your provider does not find that your vomiting is a sign of anything dangerous or life-threatening. However, sometimes the signs of serious illness do not show up right away. If you have new or worse symptoms, you may need to be seen again in the Emergency Department or by your primary provider. Remember that serious problems like appendicitis can start as vomiting.    Generally, every Emergency Department visit should have a follow-up clinic visit with either a primary or a specialty clinic/provider. Please follow-up as instructed by your emergency provider today.    Return to the  Emergency Department if:  You keep vomiting and you are not able to keep liquids down.   You feel you are getting dehydrated, such as being very thirsty, not urinating (peeing) at least every 8-12 hours, or feeling faint or lightheaded.   You develop a new fever, or your fever continues for more than 2 days.   You have abdominal (belly pain) that seems worse than cramps, is in one spot, or is getting worse over time. Appendicitis usually causes pain in the right lower abdomen (to the right and below your belly button) so watch for pain in this location.  You have blood in your vomit or stools.   You feel very weak.  You are not starting to improve within 24 hours of your visit here.     What can I do to help myself?  The most important thing to do is to drink clear liquids. If you have been vomiting a lot, it is best to have only small, frequent sips of liquids. Drinking too much at once may cause more vomiting. If you are vomiting often, you must replace minerals, sodium and potassium lost with your illness. Pedialyte  is the best available rehydration liquid but some find that it doesn t taste good so sports drinks are an alterative. You can also drink clear liquids such as water, weak tea, apple juice, and 7-Up . Avoid acid liquids (orange), caffeine (coffee) or alcohol. Do not drink milk until you no longer have diarrhea (loose stools).   After liquids are staying down, you may start eating mild foods. Soda crackers, toast, plain noodles, gelatin, applesauce and bananas are good first choices. Avoid foods that have acid, are spicy, fatty or have a lot of fiber (such as meats, coarse grains, vegetables). You may start eating these foods again in about 3 days when you are better.   Sometimes treatment includes prescription medicine to prevent nausea (sick to your stomach) and vomiting. If your provider prescribes these for you, take them as directed.   Do not take ibuprofen, naproxen, or other nonsteroidal  anti-inflammatory (NSAID) medicines without checking with your healthcare provider.     If you were given a prescription for medicine here today, be sure to read all of the information (including the package insert) that comes with your prescription.  This will include important information about the medicine, its side effects, and any warnings that you need to know about.  The pharmacist who fills the prescription can provide more information and answer questions you may have about the medicine.  If you have questions or concerns that the pharmacist cannot address, please call or return to the Emergency Department.     Remember that you can always come back to the Emergency Department if you are not able to see your regular provider in the amount of time listed above, if you get any new symptoms, or if there is anything that worries you.

## 2023-09-25 NOTE — ED PROVIDER NOTES
History     Chief Complaint:  Vomiting     Entirety of visit performed with the assistance of formal [facility] .    HPI   Lorna Kenyon is a 38 year old female who presents with chief complaint dizziness, vomiting, body aches, cough, runny nose, among many other symptoms.  She reports symptoms began yesterday.  She denies sick contacts.  She has not taken anything for the symptoms.  She is also reporting abdominal pain which she reports is worse on the right.  She states she has history of cholecystectomy, but still has her appendix.      Independent Historian:   None - Patient Only    Review of External Notes:   none       Medications:    ondansetron (ZOFRAN ODT) 4 MG ODT tab  cyclobenzaprine (FLEXERIL) 10 MG tablet  ibuprofen (ADVIL/MOTRIN) 600 MG tablet  oxyCODONE IR (ROXICODONE) 5 MG tablet  Prenatal Vit-Fe Fumarate-FA (PRENATAL VITAMIN PO)  senna-docusate (SENOKOT-S;PERICOLACE) 8.6-50 MG per tablet        Past Medical History:    Past Medical History:   Diagnosis Date    Diabetes (H)     Gestational diabetes     Wounds and injuries        Past Surgical History:    Past Surgical History:   Procedure Laterality Date     SECTION N/A 2018    Procedure:  SECTION;;  Surgeon: Marjan Bruner MD;  Location:  L+D    LAPAROSCOPIC CHOLECYSTECTOMY  2014    Procedure: LAPAROSCOPIC CHOLECYSTECTOMY;  Surgeon: Camacho Jeronimo MD;  Location:  OR        Physical Exam     Physical Exam  Nursing note and vitals reviewed.  HENT:   Mouth/Throat: Moist mucous membranes.   Eyes: EOMI, nonicteric sclera  Cardiovascular: Normal rate, regular rhythm, no murmurs, rubs, or gallops  Pulmonary/Chest: Effort normal and breath sounds normal. No respiratory distress. No wheezes. No rales.   Abdominal: Soft. Nontender, nondistended, no guarding or rigidity.   Musculoskeletal: Normal range of motion.   Neurological: Alert. Moves all extremities spontaneously.   Skin: Skin is  warm and dry. No rash noted.         Emergency Department Course     Imaging:  CT Abdomen Pelvis w Contrast   Final Result   IMPRESSION:    1.  No acute findings in the abdomen and pelvis.   2.  Incidental findings as detailed above.         Report per radiology    Laboratory:  Labs Ordered and Resulted from Time of ED Arrival to Time of ED Departure   INFLUENZA A/B, RSV, & SARS-COV2 PCR - Abnormal       Result Value    Influenza A PCR Negative      Influenza B PCR Negative      RSV PCR Negative      SARS CoV2 PCR Positive (*)    BASIC METABOLIC PANEL - Normal    Sodium 139      Potassium 3.9      Chloride 104      Carbon Dioxide (CO2) 25      Anion Gap 10      Urea Nitrogen 9.6      Creatinine 0.63      Calcium 9.1      Glucose 82      GFR Estimate >90     HCG QUALITATIVE PREGNANCY - Normal    hCG Serum Qualitative Negative     LIPASE - Normal    Lipase 16     CBC WITH PLATELETS AND DIFFERENTIAL    WBC Count 4.7      RBC Count 4.67      Hemoglobin 14.2      Hematocrit 41.9      MCV 90      MCH 30.4      MCHC 33.9      RDW 12.5      Platelet Count 173      % Neutrophils 58      % Lymphocytes 28      % Monocytes 11      % Eosinophils 3      % Basophils 0      % Immature Granulocytes 0      NRBCs per 100 WBC 0      Absolute Neutrophils 2.7      Absolute Lymphocytes 1.3      Absolute Monocytes 0.5      Absolute Eosinophils 0.2      Absolute Basophils 0.0      Absolute Immature Granulocytes 0.0      Absolute NRBCs 0.0          Emergency Department Course & Assessments:         Interventions:  Medications   ondansetron (ZOFRAN) injection 4 mg (4 mg Intravenous $Given 9/22/23 1421)   sodium chloride 0.9% BOLUS 1,000 mL (0 mLs Intravenous Stopped 9/22/23 1712)   sodium chloride 0.9% BOLUS 1,000 mL (0 mLs Intravenous Stopped 9/22/23 1913)   iopamidol (ISOVUE-370) solution 500 mL (81 mLs Intravenous $Given 9/22/23 1751)   CT scan flush (60 mLs Intravenous $Given 9/22/23 1751)   ketorolac (TORADOL) injection 15 mg (15 mg  Intravenous $Given 9/22/23 1918)        Independent Interpretation (X-rays, CTs, rhythm strip):  None    Consultations/Discussion of Management or Tests:     The patient arrived in triage where vitals were measured and recorded.   The patient was then escorted back to the emergency department.   The patient's medical records were reviewed.  Nursing notes and vitals were reviewed.    I performed an exam of the patient as documented above. The patient is in agreement with my plan of care.       Social Determinants of Health affecting care:   None    Disposition:  The patient was discharged to home.     Impression & Plan        Medical Decision Making:  Patient presents with multiple complaints as described in HPI.  Broad differential considered including appendicitis, bowel obstruction, perforation, COVID-19, gastroenteritis, among many other etiologies.  CT negative for appendicitis as well as other acute process.  While appendix was not visualized, lack of inflammatory markers and alternate explanation in the form of COVID-19 I believe is sufficient to feel comfortable about rule out.  Patient was given fluids and antiemetics and was feeling better at time of discharge.  I will prescribe Zofran for control of symptoms at home.  Discussed use of paxlovid with patient and she declines.  Patient has had COVID-19 before, therefore she is at low risk of COVID-pneumonia and other decompensation. She is in stable condition at the time of discharge, indications for return to the ED were discussed as well as follow up. All questions were answered and she is in agreement with the plan.        Diagnosis:    ICD-10-CM    1. COVID-19  U07.1       2. Nausea and vomiting, unspecified vomiting type  R11.2            Discharge Medications:  Discharge Medication List as of 9/22/2023  8:54 PM        START taking these medications    Details   ondansetron (ZOFRAN ODT) 4 MG ODT tab Take 1 tablet (4 mg) by mouth every 6 hours as needed  for nausea or vomiting, Disp-9 tablet, R-0, E-Prescribe                   Ha[place]apuro, Jack Bradshaw MD  09/25/23 014

## 2023-10-22 ENCOUNTER — HOSPITAL ENCOUNTER (OUTPATIENT)
Facility: CLINIC | Age: 38
Discharge: HOME | End: 2023-10-22
Attending: OBSTETRICS & GYNECOLOGY | Admitting: OBSTETRICS & GYNECOLOGY
Payer: COMMERCIAL

## 2023-10-22 ENCOUNTER — HOSPITAL ENCOUNTER (EMERGENCY)
Facility: CLINIC | Age: 38
End: 2023-10-22
Payer: COMMERCIAL

## 2023-10-22 ENCOUNTER — HOSPITAL ENCOUNTER (OUTPATIENT)
Facility: CLINIC | Age: 38
End: 2023-10-22
Admitting: OBSTETRICS & GYNECOLOGY
Payer: COMMERCIAL

## 2023-10-22 VITALS — RESPIRATION RATE: 18 BRPM | TEMPERATURE: 98.2 F | SYSTOLIC BLOOD PRESSURE: 102 MMHG | DIASTOLIC BLOOD PRESSURE: 63 MMHG

## 2023-10-22 VITALS
TEMPERATURE: 97.9 F | HEART RATE: 87 BPM | OXYGEN SATURATION: 100 % | RESPIRATION RATE: 17 BRPM | DIASTOLIC BLOOD PRESSURE: 61 MMHG | SYSTOLIC BLOOD PRESSURE: 104 MMHG

## 2023-10-22 PROBLEM — Z36.89 ENCOUNTER FOR TRIAGE IN PREGNANT PATIENT: Status: ACTIVE | Noted: 2023-10-22

## 2023-10-22 LAB
ALBUMIN SERPL BCG-MCNC: 4.1 G/DL (ref 3.5–5.2)
ALBUMIN UR-MCNC: 20 MG/DL
ALP SERPL-CCNC: 60 U/L (ref 35–104)
ALT SERPL W P-5'-P-CCNC: 5 U/L (ref 0–50)
ANION GAP SERPL CALCULATED.3IONS-SCNC: 10 MMOL/L (ref 7–15)
APPEARANCE UR: CLEAR
AST SERPL W P-5'-P-CCNC: 18 U/L (ref 0–45)
BASO+EOS+MONOS # BLD AUTO: NORMAL 10*3/UL
BASO+EOS+MONOS NFR BLD AUTO: NORMAL %
BASOPHILS # BLD AUTO: 0 10E3/UL (ref 0–0.2)
BASOPHILS NFR BLD AUTO: 1 %
BILIRUB SERPL-MCNC: 0.2 MG/DL
BILIRUB UR QL STRIP: NEGATIVE
BUN SERPL-MCNC: 8.5 MG/DL (ref 6–20)
CALCIUM SERPL-MCNC: 8.9 MG/DL (ref 8.6–10)
CHLORIDE SERPL-SCNC: 104 MMOL/L (ref 98–107)
COLOR UR AUTO: YELLOW
CREAT SERPL-MCNC: 0.47 MG/DL (ref 0.51–0.95)
DEPRECATED HCO3 PLAS-SCNC: 23 MMOL/L (ref 22–29)
EGFRCR SERPLBLD CKD-EPI 2021: >90 ML/MIN/1.73M2
EOSINOPHIL # BLD AUTO: 0.2 10E3/UL (ref 0–0.7)
EOSINOPHIL NFR BLD AUTO: 5 %
ERYTHROCYTE [DISTWIDTH] IN BLOOD BY AUTOMATED COUNT: 12.5 % (ref 10–15)
GLUCOSE SERPL-MCNC: 128 MG/DL (ref 70–99)
GLUCOSE UR STRIP-MCNC: NEGATIVE MG/DL
HCG INTACT+B SERPL-ACNC: ABNORMAL MIU/ML
HCT VFR BLD AUTO: 38.7 % (ref 35–47)
HGB BLD-MCNC: 12.9 G/DL (ref 11.7–15.7)
HGB UR QL STRIP: NEGATIVE
IMM GRANULOCYTES # BLD: 0 10E3/UL
IMM GRANULOCYTES NFR BLD: 0 %
KETONES UR STRIP-MCNC: NEGATIVE MG/DL
LEUKOCYTE ESTERASE UR QL STRIP: NEGATIVE
LYMPHOCYTES # BLD AUTO: 1.5 10E3/UL (ref 0.8–5.3)
LYMPHOCYTES NFR BLD AUTO: 31 %
MCH RBC QN AUTO: 30 PG (ref 26.5–33)
MCHC RBC AUTO-ENTMCNC: 33.3 G/DL (ref 31.5–36.5)
MCV RBC AUTO: 90 FL (ref 78–100)
MONOCYTES # BLD AUTO: 0.4 10E3/UL (ref 0–1.3)
MONOCYTES NFR BLD AUTO: 8 %
MUCOUS THREADS #/AREA URNS LPF: PRESENT /LPF
NEUTROPHILS # BLD AUTO: 2.6 10E3/UL (ref 1.6–8.3)
NEUTROPHILS NFR BLD AUTO: 55 %
NITRATE UR QL: NEGATIVE
NRBC # BLD AUTO: 0 10E3/UL
NRBC BLD AUTO-RTO: 0 /100
PH UR STRIP: 8 [PH] (ref 5–7)
PLATELET # BLD AUTO: 178 10E3/UL (ref 150–450)
POTASSIUM SERPL-SCNC: 4.2 MMOL/L (ref 3.4–5.3)
PROT SERPL-MCNC: 6.7 G/DL (ref 6.4–8.3)
RBC # BLD AUTO: 4.3 10E6/UL (ref 3.8–5.2)
RBC URINE: <1 /HPF
SODIUM SERPL-SCNC: 137 MMOL/L (ref 135–145)
SP GR UR STRIP: 1.02 (ref 1–1.03)
SQUAMOUS EPITHELIAL: 4 /HPF
UROBILINOGEN UR STRIP-MCNC: NORMAL MG/DL
WBC # BLD AUTO: 4.7 10E3/UL (ref 4–11)
WBC URINE: 1 /HPF

## 2023-10-22 PROCEDURE — 84702 CHORIONIC GONADOTROPIN TEST: CPT | Performed by: OBSTETRICS & GYNECOLOGY

## 2023-10-22 PROCEDURE — 96360 HYDRATION IV INFUSION INIT: CPT

## 2023-10-22 PROCEDURE — 250N000011 HC RX IP 250 OP 636: Performed by: OBSTETRICS & GYNECOLOGY

## 2023-10-22 PROCEDURE — G0463 HOSPITAL OUTPT CLINIC VISIT: HCPCS | Mod: 25

## 2023-10-22 PROCEDURE — 258N000003 HC RX IP 258 OP 636: Performed by: OBSTETRICS & GYNECOLOGY

## 2023-10-22 PROCEDURE — 96361 HYDRATE IV INFUSION ADD-ON: CPT

## 2023-10-22 PROCEDURE — 80053 COMPREHEN METABOLIC PANEL: CPT | Performed by: OBSTETRICS & GYNECOLOGY

## 2023-10-22 PROCEDURE — 81001 URINALYSIS AUTO W/SCOPE: CPT | Performed by: OBSTETRICS & GYNECOLOGY

## 2023-10-22 PROCEDURE — 85025 COMPLETE CBC W/AUTO DIFF WBC: CPT | Performed by: OBSTETRICS & GYNECOLOGY

## 2023-10-22 RX ORDER — ONDANSETRON 2 MG/ML
4 INJECTION INTRAMUSCULAR; INTRAVENOUS EVERY 6 HOURS PRN
Status: DISCONTINUED | OUTPATIENT
Start: 2023-10-22 | End: 2023-10-22 | Stop reason: HOSPADM

## 2023-10-22 RX ORDER — DEXTROSE, SODIUM CHLORIDE, SODIUM LACTATE, POTASSIUM CHLORIDE, AND CALCIUM CHLORIDE 5; .6; .31; .03; .02 G/100ML; G/100ML; G/100ML; G/100ML; G/100ML
INJECTION, SOLUTION INTRAVENOUS CONTINUOUS
Status: DISCONTINUED | OUTPATIENT
Start: 2023-10-22 | End: 2023-10-22 | Stop reason: HOSPADM

## 2023-10-22 RX ADMIN — SODIUM CHLORIDE, POTASSIUM CHLORIDE, SODIUM LACTATE AND CALCIUM CHLORIDE 1000 ML: 600; 310; 30; 20 INJECTION, SOLUTION INTRAVENOUS at 13:30

## 2023-10-22 RX ADMIN — SODIUM CHLORIDE, SODIUM LACTATE, POTASSIUM CHLORIDE, CALCIUM CHLORIDE AND DEXTROSE MONOHYDRATE: 5; 600; 310; 30; 20 INJECTION, SOLUTION INTRAVENOUS at 14:29

## 2023-10-22 ASSESSMENT — ACTIVITIES OF DAILY LIVING (ADL)
ADLS_ACUITY_SCORE: 37
ADLS_ACUITY_SCORE: 37

## 2023-10-22 NOTE — PROVIDER NOTIFICATION
10/22/23 1508   Provider Notification   Provider Name/Title Dr. FE Guerrero   Method of Notification Phone   Request Evaluate - Remote   Notification Reason Status Update     MD updated on patient status. Lab results unremarkable and symptoms improving with IV fluids. Patient denies need for IV zofran. Orders from MD to discharge and follow up outpatient in OB clinic.    Discharge orders placed and instructions reviewed with patient, all questions answered.

## 2023-10-22 NOTE — PROVIDER NOTIFICATION
10/22/23 1248   Provider Notification   Provider Name/Title Dr. FE Guerrero   Method of Notification Phone   Request Evaluate - Remote   Notification Reason Patient Arrived     Dr. FE Guerrero updated on arrival of pt () at unknown gestation who presents with N/V and abdominal/back pain. Pt is Turkmen speaking--RN utilized  on ipad to get pt's history and present complaints. Pt states she took a pregnancy test last week and it was positive. States she has not yet had prenatal care but typically sees OBGYN specialists. Denies LOF/vaginal bleeding/increased discharge. Pt also states she has had severe N/V with all prior pregnancies that resulted in outpatient IV hydration. She also states her last two deliveries were C/S's. RN attempted doptones (unsuccessful). Abd palpates soft. UA sent.    MD orders for UA, dating US, labs, IVF, and anti-emetics. Pt updated and agreeable, questions answered.

## 2023-10-22 NOTE — PLAN OF CARE
Data: [facility]: 10/22/2023 12:12 PM.  Reason for maternal/fetal assessment is abdominal pain, nausea and vomiting.  Patient is a .  Prenatal record reviewed. Pregnancy has been complicated by no prenatal care yet, history of  x2 and hyperemesis.  Gestational Age Unknown. VSS. Patient denies uterine contractions, leaking of vaginal fluid/rupture of membranes, vaginal bleeding, pelvic pressure, headache, visual disturbances, epigastric or URQ pain, significant edema. Support person is not present.   Action: Verbal consent for EFM. Triage assessment completed. Bill of rights reviewed.  Response: Patient verbalized agreement with plan. Will contact Dr Christina Guerrero with update and for further orders.     Cephalexin Pregnancy And Lactation Text: This medication is Pregnancy Category B and considered safe during pregnancy.  It is also excreted in breast milk but can be used safely for shorter doses.

## 2023-10-22 NOTE — DISCHARGE INSTRUCTIONS
Discharge Instruction for Undelivered Patients      You were seen for:  nausea, vomiting, and abdominal pain.  We Consulted: Dr. Christina Guerrero  You had (Test or Medicine): vital signs, labs, urinalysis, IV fluids.     Diet:   Drink 8 to 12 glasses of liquids (milk, juice, water) every day.  You may eat meals and snacks.     Activity:  As tolerated    Call your provider if you notice:  Swelling in your face or increased swelling in your hands or legs.  Headaches that are not relieved by Tylenol (acetaminophen).  Changes in your vision (blurring: seeing spots or stars.)  Nausea (sick to your stomach) and vomiting (throwing up).   Weight gain of 5 pounds or more per week.  Heartburn that doesn't go away.  Signs of bladder infection: pain when you urinate (use the toilet), need to go more often and more urgently.  The bag of stevens (rupture of membranes) breaks, or you notice leaking in your underwear.  Bright red blood in your underwear.  Abdominal (lower belly) or stomach pain.  For first baby: Contractions (tightening) less than 5 minutes apart for one hour or more.  Second (plus) baby: Contractions (tightening) less than 10 minutes apart and getting stronger.  *If less than 34 weeks: Contractions (tightening) more than 6 times in one hour.  Increase or change in vaginal discharge (note the color and amount)      Follow-up:  As scheduled in the clinic   [facility]: [phone]

## 2023-11-11 ENCOUNTER — APPOINTMENT (OUTPATIENT)
Dept: ULTRASOUND IMAGING | Facility: CLINIC | Age: 38
End: 2023-11-11
Attending: EMERGENCY MEDICINE
Payer: COMMERCIAL

## 2023-11-11 ENCOUNTER — HOSPITAL ENCOUNTER (EMERGENCY)
Facility: CLINIC | Age: 38
Discharge: HOME | End: 2023-11-11
Attending: EMERGENCY MEDICINE | Admitting: EMERGENCY MEDICINE
Payer: COMMERCIAL

## 2023-11-11 VITALS
HEART RATE: 85 BPM | TEMPERATURE: 97.8 F | OXYGEN SATURATION: 98 % | SYSTOLIC BLOOD PRESSURE: 104 MMHG | RESPIRATION RATE: 18 BRPM | DIASTOLIC BLOOD PRESSURE: 65 MMHG

## 2023-11-11 DIAGNOSIS — R11.2 NAUSEA AND VOMITING, UNSPECIFIED VOMITING TYPE: ICD-10-CM

## 2023-11-11 LAB
ANION GAP SERPL CALCULATED.3IONS-SCNC: 9 MMOL/L (ref 7–15)
BASOPHILS # BLD AUTO: 0 10E3/UL (ref 0–0.2)
BASOPHILS NFR BLD AUTO: 1 %
BUN SERPL-MCNC: 9 MG/DL (ref 6–20)
CALCIUM SERPL-MCNC: 8.9 MG/DL (ref 8.6–10)
CHLORIDE SERPL-SCNC: 105 MMOL/L (ref 98–107)
CREAT SERPL-MCNC: 0.52 MG/DL (ref 0.51–0.95)
DEPRECATED HCO3 PLAS-SCNC: 24 MMOL/L (ref 22–29)
EGFRCR SERPLBLD CKD-EPI 2021: >90 ML/MIN/1.73M2
EOSINOPHIL # BLD AUTO: 0.2 10E3/UL (ref 0–0.7)
EOSINOPHIL NFR BLD AUTO: 4 %
ERYTHROCYTE [DISTWIDTH] IN BLOOD BY AUTOMATED COUNT: 12.4 % (ref 10–15)
GLUCOSE SERPL-MCNC: 80 MG/DL (ref 70–99)
HCG INTACT+B SERPL-ACNC: ABNORMAL MIU/ML
HCT VFR BLD AUTO: 39.1 % (ref 35–47)
HGB BLD-MCNC: 13.3 G/DL (ref 11.7–15.7)
IMM GRANULOCYTES # BLD: 0 10E3/UL
IMM GRANULOCYTES NFR BLD: 0 %
LYMPHOCYTES # BLD AUTO: 1.3 10E3/UL (ref 0.8–5.3)
LYMPHOCYTES NFR BLD AUTO: 34 %
MCH RBC QN AUTO: 30.4 PG (ref 26.5–33)
MCHC RBC AUTO-ENTMCNC: 34 G/DL (ref 31.5–36.5)
MCV RBC AUTO: 89 FL (ref 78–100)
MONOCYTES # BLD AUTO: 0.4 10E3/UL (ref 0–1.3)
MONOCYTES NFR BLD AUTO: 9 %
NEUTROPHILS # BLD AUTO: 2 10E3/UL (ref 1.6–8.3)
NEUTROPHILS NFR BLD AUTO: 52 %
NRBC # BLD AUTO: 0 10E3/UL
NRBC BLD AUTO-RTO: 0 /100
PLATELET # BLD AUTO: 165 10E3/UL (ref 150–450)
POTASSIUM SERPL-SCNC: 4.2 MMOL/L (ref 3.4–5.3)
RBC # BLD AUTO: 4.38 10E6/UL (ref 3.8–5.2)
SODIUM SERPL-SCNC: 138 MMOL/L (ref 135–145)
WBC # BLD AUTO: 3.9 10E3/UL (ref 4–11)

## 2023-11-11 PROCEDURE — 76815 OB US LIMITED FETUS(S): CPT

## 2023-11-11 PROCEDURE — 76801 OB US < 14 WKS SINGLE FETUS: CPT

## 2023-11-11 PROCEDURE — 96360 HYDRATION IV INFUSION INIT: CPT

## 2023-11-11 PROCEDURE — 84702 CHORIONIC GONADOTROPIN TEST: CPT | Performed by: EMERGENCY MEDICINE

## 2023-11-11 PROCEDURE — 99285 EMERGENCY DEPT VISIT HI MDM: CPT | Mod: 25

## 2023-11-11 PROCEDURE — 36415 COLL VENOUS BLD VENIPUNCTURE: CPT | Performed by: EMERGENCY MEDICINE

## 2023-11-11 PROCEDURE — 258N000003 HC RX IP 258 OP 636: Performed by: EMERGENCY MEDICINE

## 2023-11-11 PROCEDURE — 80048 BASIC METABOLIC PNL TOTAL CA: CPT | Performed by: EMERGENCY MEDICINE

## 2023-11-11 PROCEDURE — 96361 HYDRATE IV INFUSION ADD-ON: CPT

## 2023-11-11 PROCEDURE — 85025 COMPLETE CBC W/AUTO DIFF WBC: CPT | Performed by: EMERGENCY MEDICINE

## 2023-11-11 RX ADMIN — SODIUM CHLORIDE 1000 ML: 9 INJECTION, SOLUTION INTRAVENOUS at 12:41

## 2023-11-11 ASSESSMENT — ACTIVITIES OF DAILY LIVING (ADL)
ADLS_ACUITY_SCORE: 37
ADLS_ACUITY_SCORE: 37

## 2023-11-11 NOTE — ED PROVIDER NOTES
History     Chief Complaint:  Nausea & Vomiting       The history is provided by the patient. A  was used (Thai).      Lorna Kenyon is a 38 year old  8 week pregnant female who presents with nausea and vomiting. Patient she had an onset of severe nausea and vomiting around midnight last night. She has had 6 episodes of vomiting since then. Patient notes that this is consistent with her last pregnancies and the severity is the exact same. She does have Zofran at home but hasn't taken it because it makes her dizzy and more nauseated. Patient denies fever, urinary symptoms, abdominal cramping, or vaginal bleeding. She had an appointment with her OB yesterday and has an ultrasound appointment in 2 weeks. Patient is taking only pre- vitamins and has not tried other antinausea medication in the past.     Independent Historian:   None - Patient Only    Review of External Notes:   I reviewed record form 10/22/23. Patient was admitted to labor and delivery for nausea and vomiting. She has had issues with nausea and vomiting in all of her pregnancies and resulted to outpatient IV hydration in the past.  Patient is . She is RH positive.    Medications:    Pre- vitamin    Past Medical History:    GERD  Gestational diabetes  Diabetes    Past Surgical History:    Vaginal delivery    Cholecystectomy x2     Physical Exam   Patient Vitals for the past 24 hrs:   BP Temp Temp src Pulse Resp SpO2   23 1520 -- -- -- -- -- 98 %   23 1519 104/65 -- -- 85 -- --   23 1128 97/67 97.8  F (36.6  C) Temporal 82 18 100 %      Physical Exam  General:   Well-nourished   Speaking in full sentences  Eyes:   Conjunctiva without injection or scleral icterus  ENT:   Moist mucous membranes   Nares patent   Pinnae normal  Neck:   Full ROM   No stiffness appreciated  Resp:   Lungs CTAB   No crackles, wheezing or audible rubs   Good air movement  CV:    Normal rate, regular rhythm   S1  and S2 present   No murmur, gallop or rub  GI:   BS present   Abdomen soft without distention   Non-tender to light and deep palpation   No guarding or rebound tenderness  Skin:   Warm, dry, well perfused   No rashes or open wounds on exposed skin  MSK:   Moves all extremities   No focal deformities or swelling  Neuro:   Alert   Answers questions appropriately   Moves all extremities equally   Gait stable  Psych:   Normal affect, normal mood      Emergency Department Course   Imaging:  OB  US 1st trimester w transvag   Final Result   IMPRESSION:    1.  There is a single intrauterine gestational sac with a mean sac diameter of 3.1 cm and a crown-rump length of 1.5 cm but no cardiac activity. Findings are diagnostic of pregnancy failure.      Findings Diagnostic of Pregnancy Failure      CRL >7mm and no FHR   MSD >25 mm and no embryo   Absence of embryo with FHR >11 days after a previous US showed a gestational sac with a yolk sac.   Absence of embryo with FHR >2 weeks after a previous US showed a gestational sac without a yolk sac.      Reference: Diagnostic Criteria for Nonviable Pregnancy Early in the First Trimester. Ultrasound Quarterly; 30(1): 3-9, March 2014      Findings discussed with AVNI ZELAYA at 11/11/2023 2:00 PM CST.       POC US OB TRANSABDOMINAL LIMITED   Final Result     Limited Obstetric Ultrasound      Procedure Note: POC Ultrasound Limited Obstetric Exam      (This is a limited bedside US which does not assess fetal health and is not a formal OB ultrasound)       Indication: Nausea and vomiting, new pregnancy      Views: Suprapubic transverse and longitudinal       Findings: Unable to confirm IUP      Impression: Unable to confirm IUP      Study performed by: Avni Zelaya MD       Images archived: Yes            Laboratory:  Labs Ordered and Resulted from Time of ED Arrival to Time of ED Departure   HCG QUANTITATIVE PREGNANCY - Abnormal       Result Value    hCG Quantitative 50,476  (*)    CBC WITH PLATELETS AND DIFFERENTIAL - Abnormal    WBC Count 3.9 (*)     RBC Count 4.38      Hemoglobin 13.3      Hematocrit 39.1      MCV 89      MCH 30.4      MCHC 34.0      RDW 12.4      Platelet Count 165      % Neutrophils 52      % Lymphocytes 34      % Monocytes 9      % Eosinophils 4      % Basophils 1      % Immature Granulocytes 0      NRBCs per 100 WBC 0      Absolute Neutrophils 2.0      Absolute Lymphocytes 1.3      Absolute Monocytes 0.4      Absolute Eosinophils 0.2      Absolute Basophils 0.0      Absolute Immature Granulocytes 0.0      Absolute NRBCs 0.0     BASIC METABOLIC PANEL - Normal    Sodium 138      Potassium 4.2      Chloride 105      Carbon Dioxide (CO2) 24      Anion Gap 9      Urea Nitrogen 9.0      Creatinine 0.52      GFR Estimate >90      Calcium 8.9      Glucose 80     ROUTINE UA WITH MICROSCOPIC REFLEX TO CULTURE      Emergency Department Course & Assessments:  Interventions:  Medications   sodium chloride 0.9% BOLUS 1,000 mL (0 mLs Intravenous Stopped 11/11/23 1511)      Assessments:  1139 I obtained history and examined the patient as noted above.  1151 I performed a bedside ultrasound  1439 I rechecked and updated the patient on her ultrasound results and discharge.     Independent Interpretation (X-rays, CTs, rhythm strip):  None    Consultations/Discussion of Management or Tests:  None     Social Determinants of Health affecting care:   None    Disposition:  The patient was discharged to home.     Impression & Plan    Medical Decision Making:  Lorna Kenyon is a 38-year-old female presenting to the ED for evaluation of nausea and vomiting.  Patient estimates she is approximately 8 weeks pregnant based on last menstrual period.  She notes nausea and vomiting with 6 episodes earlier today.  She acknowledges a history of recurrent vomiting with previous pregnancies.  Work-up in the ED included laboratory studies and advanced imaging.  Fortunately, no evidence of gross  metabolic derangement is noted.  She was provided IV fluids noting improvement in symptoms.  She in fact declined offers for antiemetics, noting that this oftentimes make her symptoms worse.  She denies any abdominal pain, vaginal bleeding or contractions.  As she has not yet had formal ultrasound performed, I did initially begin with bedside ultrasound.  Unfortunately, I was unable to identify definitive IUP based on this study.  For that reason, formal ultrasound was performed.  Gestational sac is noted, though no fetal cardiac activity is noted, that raises concern for fetal demise.  Patient is Rh+ per chart review and does not require RhoGAM.  Her quantitative hCG remains detectable at 50,476, though previous value from October 22, 2023 returned at 46,413, and would expect a higher number given duration of time elapsed between recent testing with a normal pregnancy.  I discussed the results of the studies with the patient using a professional [employer] .  Ultimately, she is feeling markedly improved and she is otherwise felt to be appropriate candidate for outpatient management.  I recommended follow-up with primary OB provider early this coming for reevaluation and discussion of her symptoms.  She will likely require serial hCGs for ongoing following.  She is to return to the ER should she develop any worsening vomiting, development of cramping, vaginal bleeding or any other concerns.  Questions answered prior to discharge.    Diagnosis:    ICD-10-CM    1. Nausea and vomiting, unspecified vomiting type  R11.2            Scribe Disclosure:  I, Ana Churchill, am serving as a scribe at 12:55 PM on 11/11/2023 to document services personally performed by Gary Horne MD based on my observations and the provider's statements to me.     11/11/2023   Gary Horne MD Roach, Brian Donald, MD  11/12/23 0650

## 2023-11-11 NOTE — ED NOTES
OB/GYN/ReproductiveAdditional Documentation:  (pt comes in with N/V that started last night. pt reports being preg. last period was 2 months ago. saw obgyn yesterday, has not had a US yet to determine dates. this is pt 7th preg. and she has 6 living children at home. pt reports vomiting 6 times.)

## 2023-11-11 NOTE — DISCHARGE INSTRUCTIONS
Please follow-up with your OB/GYN provider early this coming week to review the test results as well as your symptoms.    Continue small yet frequent sips of fluids to ensure hydration.    Return to the ER if you develop worsening nausea, vomiting, vaginal bleeding, cramping or any other concerns.

## 2023-11-11 NOTE — ED TRIAGE NOTES
A&O x4, ABCs intact. Pt presents with vomiting since around midnight. Pt states that she is about a month pregnant.        Triage Assessment (Adult)       Row Name 11/11/23 1126          Triage Assessment    Airway WDL WDL        Respiratory WDL    Respiratory WDL WDL        Cardiac WDL    Cardiac WDL WDL

## 2023-12-07 ENCOUNTER — HOSPITAL ENCOUNTER (EMERGENCY)
Facility: CLINIC | Age: 38
Discharge: HOME | End: 2023-12-07
Attending: EMERGENCY MEDICINE | Admitting: EMERGENCY MEDICINE
Payer: COMMERCIAL

## 2023-12-07 VITALS
SYSTOLIC BLOOD PRESSURE: 103 MMHG | RESPIRATION RATE: 18 BRPM | TEMPERATURE: 99.3 F | OXYGEN SATURATION: 100 % | HEART RATE: 79 BPM | DIASTOLIC BLOOD PRESSURE: 79 MMHG

## 2023-12-07 DIAGNOSIS — R50.9 FEVER, UNSPECIFIED FEVER CAUSE: ICD-10-CM

## 2023-12-07 DIAGNOSIS — J10.1 INFLUENZA A: ICD-10-CM

## 2023-12-07 LAB
FLUAV RNA SPEC QL NAA+PROBE: POSITIVE
FLUBV RNA RESP QL NAA+PROBE: NEGATIVE
RSV RNA SPEC NAA+PROBE: NEGATIVE
SARS-COV-2 RNA RESP QL NAA+PROBE: NEGATIVE

## 2023-12-07 PROCEDURE — 87637 SARSCOV2&INF A&B&RSV AMP PRB: CPT | Performed by: EMERGENCY MEDICINE

## 2023-12-07 PROCEDURE — 250N000013 HC RX MED GY IP 250 OP 250 PS 637: Performed by: EMERGENCY MEDICINE

## 2023-12-07 PROCEDURE — 99283 EMERGENCY DEPT VISIT LOW MDM: CPT

## 2023-12-07 RX ORDER — IBUPROFEN 600 MG/1
600 TABLET, FILM COATED ORAL ONCE
Status: COMPLETED | OUTPATIENT
Start: 2023-12-07 | End: 2023-12-07

## 2023-12-07 RX ORDER — ACETAMINOPHEN 325 MG/1
975 TABLET ORAL ONCE
Status: COMPLETED | OUTPATIENT
Start: 2023-12-07 | End: 2023-12-07

## 2023-12-07 RX ADMIN — ACETAMINOPHEN 975 MG: 325 TABLET, FILM COATED ORAL at 21:47

## 2023-12-07 RX ADMIN — IBUPROFEN 600 MG: 600 TABLET, FILM COATED ORAL at 21:47

## 2023-12-07 ASSESSMENT — ACTIVITIES OF DAILY LIVING (ADL): ADLS_ACUITY_SCORE: 35

## 2023-12-08 NOTE — ED TRIAGE NOTES
Pt arrives with complaint of fever for 2 days, myalgias, and pain in her right sinus area going into her right ear. A&Ox4.

## 2023-12-08 NOTE — ED PROVIDER NOTES
History     Chief Complaint:  Fever and myalgias     The history is provided by the patient.      Lorna Kenyon is a 38 year old female with no past pertinent medical history who presents to the emergency department for fever. The patient states that for two days, she has been experiencing a fever, myalgias, and sinus pressure. Denies vomiting. She reports that her daughter tested positive for influenza a few days ago with whom she presents to the emergency department with. She notes she has no past pertinent medical history.    Independent Historian:   None - Patient Only    Review of External Notes:   I reviewed Care Everywhere and updated Epic.     Medications:    Multivitamin    Past Medical History:    Past Medical History:   Diagnosis Date    Gestational diabetes      Past Surgical History:    Past Surgical History:   Procedure Laterality Date     SECTION N/A 2018    Procedure:  SECTION;;  Surgeon: Marjan Bruner MD;  Location:  [facility]    LAPAROSCOPIC CHOLECYSTECTOMY  2014    Procedure: LAPAROSCOPIC CHOLECYSTECTOMY;  Surgeon: Camacho Jeronimo MD;  Location:  OR      Physical Exam   Patient Vitals for the past 24 hrs:   BP Temp Temp src Pulse Resp SpO2   23 2137 103/79 99.3  F (37.4  C) Oral 79 18 100 %      Physical Exam  Nursing note and vitals reviewed.  Constitutional: Cooperative.  Sitting up comfortably  HENT:   Mouth/Throat: Mucous membranes are normal.  Tolerating secretions well  No neck rigidity  Cardiovascular: Normal rate, regular rhythm and normal heart sounds.  No murmur.  Pulmonary/Chest: Effort normal and breath sounds normal. No respiratory distress. No wheezes. No rales.   Abdominal: Normal appearance  Neurological: Alert.  Oriented x 3  Skin: Skin is warm and dry.   Psychiatric: Normal mood and affect.     Emergency Department Course     Laboratory:  Labs Ordered and Resulted from Time of ED Arrival to Time of ED Departure   INFLUENZA  A/B, RSV, & SARS-COV2 PCR - Abnormal       Result Value    Influenza A PCR Positive (*)     Influenza B PCR Negative      RSV PCR Negative      SARS CoV2 PCR Negative         Interventions:  Medications   ibuprofen (ADVIL/MOTRIN) tablet 600 mg (600 mg Oral $Given 12/7/23 2147)   acetaminophen (TYLENOL) tablet 975 mg (975 mg Oral $Given 12/7/23 2147)      Assessments:  2310 I obtained history and examined the patient as noted above. I discussed findings and   discharge with the patient. All questions answered.     Independent Interpretation (X-rays, CTs, rhythm strip):  None    Consultations/Discussion of Management or Tests:  None     Social Determinants of Health affecting care:   None    Disposition:  The patient was discharged to home.     Impression & Plan      Medical Decision Making:  Lorna Kenyon is a 38 year old female who presents for evaluation of fever and myalgias. They have other symptoms including sinus pressure. This is consistent with influenza and they have tested positive for influenza, here in the emergency department. They are at risk for pneumonia but no signs of this are detected on today's visit.  Close followup of primary care physician is indicated and return to the ED for high fevers > 103 for more than 48 hours more, increasing productive cough, shortness of breath, or confusion.  There is no signs of serious bacterial infection such as bacteremia, meningitis, UTI/pyelonephritis, strep pharyngitis, etc.      Diagnosis:    ICD-10-CM    1. Influenza A  J10.1       2. Fever, unspecified fever cause  R50.9          Scribe Disclosure:  I, Herbert Raphael, am serving as a scribe at 11:17 PM on 12/7/2023 to document services personally performed by Jan Mcintyre MD based on my observations and the provider's statements to me.     12/7/2023   Jan Mcintyre MD Amdahl, John, MD  12/08/23 0041

## 2024-01-16 ENCOUNTER — HOSPITAL ENCOUNTER (EMERGENCY)
Facility: CLINIC | Age: 39
Discharge: HOME | End: 2024-01-16
Attending: STUDENT IN AN ORGANIZED HEALTH CARE EDUCATION/TRAINING PROGRAM | Admitting: STUDENT IN AN ORGANIZED HEALTH CARE EDUCATION/TRAINING PROGRAM
Payer: COMMERCIAL

## 2024-01-16 VITALS
OXYGEN SATURATION: 98 % | SYSTOLIC BLOOD PRESSURE: 109 MMHG | RESPIRATION RATE: 16 BRPM | TEMPERATURE: 97.5 F | DIASTOLIC BLOOD PRESSURE: 78 MMHG | HEART RATE: 75 BPM

## 2024-01-16 DIAGNOSIS — R11.10 VOMITING, UNSPECIFIED VOMITING TYPE, UNSPECIFIED WHETHER NAUSEA PRESENT: ICD-10-CM

## 2024-01-16 DIAGNOSIS — M54.50 ACUTE BILATERAL LOW BACK PAIN WITHOUT SCIATICA: ICD-10-CM

## 2024-01-16 DIAGNOSIS — R51.9 ACUTE NONINTRACTABLE HEADACHE, UNSPECIFIED HEADACHE TYPE: ICD-10-CM

## 2024-01-16 LAB
ALBUMIN SERPL BCG-MCNC: 4.2 G/DL (ref 3.5–5.2)
ALP SERPL-CCNC: 77 U/L (ref 40–150)
ALT SERPL W P-5'-P-CCNC: 9 U/L (ref 0–50)
ANION GAP SERPL CALCULATED.3IONS-SCNC: 7 MMOL/L (ref 7–15)
AST SERPL W P-5'-P-CCNC: 20 U/L (ref 0–45)
ATRIAL RATE - MUSE: 74 BPM
BASOPHILS # BLD AUTO: 0 10E3/UL (ref 0–0.2)
BASOPHILS NFR BLD AUTO: 1 %
BILIRUB SERPL-MCNC: 0.4 MG/DL
BUN SERPL-MCNC: 7.6 MG/DL (ref 6–20)
CALCIUM SERPL-MCNC: 8.9 MG/DL (ref 8.6–10)
CHLORIDE SERPL-SCNC: 105 MMOL/L (ref 98–107)
CREAT SERPL-MCNC: 0.58 MG/DL (ref 0.51–0.95)
DEPRECATED HCO3 PLAS-SCNC: 29 MMOL/L (ref 22–29)
DIASTOLIC BLOOD PRESSURE - MUSE: NORMAL MMHG
EGFRCR SERPLBLD CKD-EPI 2021: >90 ML/MIN/1.73M2
EOSINOPHIL # BLD AUTO: 0.3 10E3/UL (ref 0–0.7)
EOSINOPHIL NFR BLD AUTO: 6 %
ERYTHROCYTE [DISTWIDTH] IN BLOOD BY AUTOMATED COUNT: 13.2 % (ref 10–15)
GLUCOSE SERPL-MCNC: 122 MG/DL (ref 70–99)
HCG SERPL QL: NEGATIVE
HCT VFR BLD AUTO: 39.1 % (ref 35–47)
HGB BLD-MCNC: 12.9 G/DL (ref 11.7–15.7)
HOLD SPECIMEN: NORMAL
HOLD SPECIMEN: NORMAL
IMM GRANULOCYTES # BLD: 0 10E3/UL
IMM GRANULOCYTES NFR BLD: 0 %
INTERPRETATION ECG - MUSE: NORMAL
LIPASE SERPL-CCNC: 17 U/L (ref 13–60)
LYMPHOCYTES # BLD AUTO: 1.3 10E3/UL (ref 0.8–5.3)
LYMPHOCYTES NFR BLD AUTO: 27 %
MCH RBC QN AUTO: 30.2 PG (ref 26.5–33)
MCHC RBC AUTO-ENTMCNC: 33 G/DL (ref 31.5–36.5)
MCV RBC AUTO: 92 FL (ref 78–100)
MONOCYTES # BLD AUTO: 0.4 10E3/UL (ref 0–1.3)
MONOCYTES NFR BLD AUTO: 7 %
NEUTROPHILS # BLD AUTO: 2.9 10E3/UL (ref 1.6–8.3)
NEUTROPHILS NFR BLD AUTO: 59 %
NRBC # BLD AUTO: 0 10E3/UL
NRBC BLD AUTO-RTO: 0 /100
P AXIS - MUSE: 32 DEGREES
PLATELET # BLD AUTO: 198 10E3/UL (ref 150–450)
POTASSIUM SERPL-SCNC: 3.9 MMOL/L (ref 3.4–5.3)
PR INTERVAL - MUSE: 124 MS
PROT SERPL-MCNC: 7.3 G/DL (ref 6.4–8.3)
QRS DURATION - MUSE: 84 MS
QT - MUSE: 384 MS
QTC - MUSE: 426 MS
R AXIS - MUSE: 13 DEGREES
RBC # BLD AUTO: 4.27 10E6/UL (ref 3.8–5.2)
SODIUM SERPL-SCNC: 141 MMOL/L (ref 135–145)
SYSTOLIC BLOOD PRESSURE - MUSE: NORMAL MMHG
T AXIS - MUSE: 2 DEGREES
VENTRICULAR RATE- MUSE: 74 BPM
WBC # BLD AUTO: 4.9 10E3/UL (ref 4–11)

## 2024-01-16 PROCEDURE — 93005 ELECTROCARDIOGRAM TRACING: CPT

## 2024-01-16 PROCEDURE — 85004 AUTOMATED DIFF WBC COUNT: CPT | Performed by: STUDENT IN AN ORGANIZED HEALTH CARE EDUCATION/TRAINING PROGRAM

## 2024-01-16 PROCEDURE — 84703 CHORIONIC GONADOTROPIN ASSAY: CPT | Performed by: STUDENT IN AN ORGANIZED HEALTH CARE EDUCATION/TRAINING PROGRAM

## 2024-01-16 PROCEDURE — 36415 COLL VENOUS BLD VENIPUNCTURE: CPT | Performed by: STUDENT IN AN ORGANIZED HEALTH CARE EDUCATION/TRAINING PROGRAM

## 2024-01-16 PROCEDURE — 80053 COMPREHEN METABOLIC PANEL: CPT | Performed by: STUDENT IN AN ORGANIZED HEALTH CARE EDUCATION/TRAINING PROGRAM

## 2024-01-16 PROCEDURE — 250N000011 HC RX IP 250 OP 636: Performed by: STUDENT IN AN ORGANIZED HEALTH CARE EDUCATION/TRAINING PROGRAM

## 2024-01-16 PROCEDURE — 99284 EMERGENCY DEPT VISIT MOD MDM: CPT

## 2024-01-16 PROCEDURE — 250N000013 HC RX MED GY IP 250 OP 250 PS 637: Performed by: STUDENT IN AN ORGANIZED HEALTH CARE EDUCATION/TRAINING PROGRAM

## 2024-01-16 PROCEDURE — 83690 ASSAY OF LIPASE: CPT | Performed by: STUDENT IN AN ORGANIZED HEALTH CARE EDUCATION/TRAINING PROGRAM

## 2024-01-16 RX ORDER — ACETAMINOPHEN 500 MG
1000 TABLET ORAL ONCE
Status: COMPLETED | OUTPATIENT
Start: 2024-01-16 | End: 2024-01-16

## 2024-01-16 RX ORDER — ONDANSETRON 4 MG/1
4 TABLET, ORALLY DISINTEGRATING ORAL ONCE
Status: COMPLETED | OUTPATIENT
Start: 2024-01-16 | End: 2024-01-16

## 2024-01-16 RX ADMIN — ONDANSETRON 4 MG: 4 TABLET, ORALLY DISINTEGRATING ORAL at 11:48

## 2024-01-16 RX ADMIN — ACETAMINOPHEN 1000 MG: 500 TABLET, FILM COATED ORAL at 11:48

## 2024-01-16 ASSESSMENT — ACTIVITIES OF DAILY LIVING (ADL): ADLS_ACUITY_SCORE: 37

## 2024-01-16 NOTE — DISCHARGE INSTRUCTIONS
Discharge Instructions  Back Pain  You were seen today for back pain. Back pain can have many causes, but most will get better without surgery or other specific treatment. Sometimes there is a herniated ( slipped ) disc. We do not usually do MRI scans to look for these right away, since most herniated discs will get better on their own with time.  Today, we did not find any evidence that your back pain was caused by a serious condition. However, sometimes symptoms develop over time and cannot be found during an emergency visit, so it is very important that you follow up with your primary provider.  Generally, every Emergency Department visit should have a follow-up clinic visit with either a primary or a specialty clinic/provider. Please follow-up as instructed by your emergency provider today.    Return to the Emergency Department if:  You develop a fever with your back pain.   You have weakness or change in sensation in one or both legs.  You lose control of your bowels or bladder, or cannot empty your bladder (cannot pee).  Your pain gets much worse.     Follow-up with your provider:  Unless your pain has completely gone away, please make an appointment with your provider within one week. Most of the routine care for back pain is available in a clinic and not the Emergency Department. You may need further management of your back pain, such as more pain medication, imaging such as an X-ray or MRI, or physical therapy.    What can I do to help myself?  Remain Active -- People are often afraid that they will hurt their back further or delay recovery by remaining active, but this is one of the best things you can do for your back. In fact, staying in bed for a long time to rest is not recommended. Studies have shown that people with low back pain recover faster when they remain active. Movement helps to bring blood flow to the muscles and relieve muscle spasms as well as preventing loss of muscle strength.  Heat --  Using a heating pad can help with low back pain during the first few weeks. Do not sleep with a heating pad, as you can be burned.   Pain medications - You may take a pain medication such as Tylenol  (acetaminophen), Advil , Motrin  (ibuprofen) or Aleve  (naproxen).  If you were given a prescription for medicine here today, be sure to read all of the information (including the package insert) that comes with your prescription.  This will include important information about the medicine, its side effects, and any warnings that you need to know about.  The pharmacist who fills the prescription can provide more information and answer questions you may have about the medicine.  If you have questions or concerns that the pharmacist cannot address, please call or return to the Emergency Department.   Remember that you can always come back to the Emergency Department if you are not able to see your regular provider in the amount of time listed above, if you get any new symptoms, or if there is anything that worries you.  Discharge Instructions  Headache    You were seen today for a headache. Headaches may be caused by many different things such as muscle tension, sinus inflammation, anxiety and stress, having too little sleep, too much alcohol, some medical conditions or injury. You may have a migraine, which is caused by changes in the blood vessels in your head.  At this time your provider does not find that your headache is a sign of anything dangerous or life-threatening.  However, sometimes the signs of serious illness do not show up right away.      Generally, every Emergency Department visit should have a follow-up clinic visit with either a primary or a specialty clinic/provider. Please follow-up as instructed by your emergency provider today.    Return to the Emergency Department if:  You get a new fever of 100.4 F or higher.  Your headache gets much worse.  You get a stiff neck with your headache.  You get a new  headache that is significantly different or worse than headaches you have had before.  You are vomiting (throwing up) and cannot keep food or water down.  You have blurry or double vision or other problems with your eyes.  You have a new weakness on one side of your body.  You have difficulty with balance which is new.  You or your family thinks you are confused.  You have a seizure.    What can I do to help myself?  Pain medications - You may take a pain medication such as Tylenol  (acetaminophen), Advil , Motrin  (ibuprofen) or Aleve  (naproxen).  Take a pain reliever as soon as you notice symptoms.  Starting medications as soon as you start to have symptoms may lessen the amount of pain you have.  Relaxing in a quiet, dark room may help.  Get enough sleep and eat meals regularly.  You may need to watch for certain foods or other things which may trigger your headaches.  Keeping a journal of your headaches and possible triggers may help you and your primary provider to identify things which you should avoid which may be causing your headaches.  If you were given a prescription for medicine here today, be sure to read all of the information (including the package insert) that comes with your prescription.  This will include important information about the medicine, its side effects, and any warnings that you need to know about.  The pharmacist who fills the prescription can provide more information and answer questions you may have about the medicine.  If you have questions or concerns that the pharmacist cannot address, please call or return to the Emergency Department.   Remember that you can always come back to the Emergency Department if you are not able to see your regular provider in the amount of time listed above, if you get any new symptoms, or if there is anything that worries you.  Return to the emergency department if symptoms are worsening, become concerning, or for any other concerns. Follow-up with  your doctor in 2-3 and sooner if needed.

## 2024-01-16 NOTE — ED PROVIDER NOTES
History     Chief Complaint:  No chief complaint on file.     The history is provided by the patient. A  was used.      Lorna Kenyon is a 38 year old female presenting with back pain that radiates to the side since 0000 this morning. Patient had 2 episodes of vomiting today. She is also experiencing nausea and headache. She is unsure of the cause. Denies chest pain, difficulty breathing, abd pain, fever, changes in bowel/bladder, numbness, sore throat, vaginal bleeding, abnormal discharge, or cough. No other medical problems. No recent surgery, trauma, or injury. She is unsure if she is pregnant. No recent alcohol or drug use.     Independent Historian:    None - Patient Only    Review of External Notes:  No notes reviewed    Allergies:  No Known Allergies     Physical Exam   Patient Vitals for the past 24 hrs:   BP Temp Temp src Pulse Resp SpO2   01/16/24 1103 109/78 97.5  F (36.4  C) Temporal 75 16 98 %      Physical Exam  GENERAL: Patient well-appearing  HEAD: Atraumatic.  NECK: No rigidity  EYE: PERRL  CV: RRR, no murmurs rubs or gallops  PULM: CTAB with good aeration; no retractions, rales, rhonchi, or wheezing.   ABD: Soft, nontender, nondistended, no guarding  DERM: No rash. Skin warm and dry  EXTREMITY: Moving all extremities without difficulty. No calf tenderness or peripheral edema  VASCULAR: Symmetric pulses bilaterally  Neuro: Alert.  Speaking clearly.  No facial droop.  Clear speech.  Strength 5 out of 5 bilateral upper and lower extremities.  Back: No midline spinal tenderness step-offs or deformity.  Mild paraspinal muscle tenderness bilaterally.  Emergency Department Course   ECG  ECG interpreted by me.  Time 1127  NSR at 74. No ST elevation or depression. Normal intervals. Normal axis.   No evidence of WPW, Brugada, HOCM, ARVD, ASD, or Wellen's.    Laboratory: Imaging:   Labs Ordered and Resulted from Time of ED Arrival to Time of ED Departure   COMPREHENSIVE METABOLIC  PANEL - Abnormal       Result Value    Sodium 141      Potassium 3.9      Carbon Dioxide (CO2) 29      Anion Gap 7      Urea Nitrogen 7.6      Creatinine 0.58      GFR Estimate >90      Calcium 8.9      Chloride 105      Glucose 122 (*)     Alkaline Phosphatase 77      AST 20      ALT 9      Protein Total 7.3      Albumin 4.2      Bilirubin Total 0.4     LIPASE - Normal    Lipase 17     HCG QUALITATIVE PREGNANCY - Normal    hCG Serum Qualitative Negative     CBC WITH PLATELETS AND DIFFERENTIAL    WBC Count 4.9      RBC Count 4.27      Hemoglobin 12.9      Hematocrit 39.1      MCV 92      MCH 30.2      MCHC 33.0      RDW 13.2      Platelet Count 198      % Neutrophils 59      % Lymphocytes 27      % Monocytes 7      % Eosinophils 6      % Basophils 1      % Immature Granulocytes 0      NRBCs per 100 WBC 0      Absolute Neutrophils 2.9      Absolute Lymphocytes 1.3      Absolute Monocytes 0.4      Absolute Eosinophils 0.3      Absolute Basophils 0.0      Absolute Immature Granulocytes 0.0      Absolute NRBCs 0.0       No orders to display           Emergency Department Course & Assessments:    Interventions:  Medications   acetaminophen (TYLENOL) tablet 1,000 mg (1,000 mg Oral $Given 1/16/24 1148)   ondansetron (ZOFRAN ODT) ODT tab 4 mg (4 mg Oral $Given 1/16/24 1148)        Assessments, Independent Interpretation, Consult/Discussion of ManagementTests:  ED Course as of 01/16/24 1422   Tue Jan 16, 2024   1138 I obtained the history and examined the patient as noted above.        Disposition:  The patient was discharged to home.     Impression & Plan    CMS Diagnoses: None    Code Status: No Order    Medical Decision Making:  Symptoms most consistent with musculoskeletal back pain.  Symptoms appear exacerbated by certain movements of her torso and with palpation of the paraspinal muscles.    Chronic conditions complicating - back pain    Differential diagnosis considered, but not limited to fracture, cauda equina,  epidural abscess, but evaluation not consistent with these etiologies.     Vital signs unremarkable.     No red flags for back pain and thus considered advanced CT/MRI imaging, but not emergently indicated.    Given Tylenol and Zofran.    Patient had labs drawn from triage including CBC, CMP, lipase, hCG which were all unremarkable.  Triage note mentions chest pain but patient denies chest pain.  I utilized an .    Patient felt improved.    Patient felt ready to go home.    Patient was evaluated for acute medical emergencies. Based on my clinical assessment, I do not think any further acute management or work-up is required.  Patient stable for discharge. Given strict return precautions. All questions answered. Patient content with plan. Recommended PCP follow-up in 2-3 days.        Critical Care:  None.    Diagnosis:    ICD-10-CM    1. Acute bilateral low back pain without sciatica  M54.50       2. Vomiting, unspecified vomiting type, unspecified whether nausea present  R11.10       3. Acute nonintractable headache, unspecified headache type  R51.9            Discharge Medications:  Discharge Medication List as of 1/16/2024  1:10 PM         Scribe Disclosure:  I, Maryse Calvo, am serving as a scribe at 1:27 PM on 1/16/2024 to document services personally performed by Ravi Quigley MD based on my observations and the provider's statements to me.    1/16/2024   Ravi Quigley MD Foss, Kevin, MD  01/16/24 6151

## 2024-01-16 NOTE — ED TRIAGE NOTES
Pt reports chest/epigastric pain that wraps around to her back since midnight. Pt alert and ambulatory. Pt reports 2 episodes of vomiting today.

## 2024-08-13 NOTE — PHARMACY-ADMISSION MEDICATION HISTORY
Admission medication history interview status for this patient is complete. See [facility] admission navigator for allergy information, prior to admission medications and immunization status.     Medication history interview source(s):Patient and Family  Medication history resources (including written lists, pill bottles, clinic record):None  Primary pharmacy:-    Changes made to PTA medication list:  Added: prenatal vit  Deleted: colace, unisom, norco, percocet, vit b6  Changed: -    Actions taken by pharmacist (provider contacted, etc):None     Additional medication history information:None    Medication reconciliation/reorder completed by provider prior to medication history? No    Do you take OTC medications (eg tylenol, ibuprofen, fish oil, eye/ear drops, etc)? yes(Y/N)    For patients on insulin therapy: no (Y/N)  Lantus/levemir/NPH/Mix 70/30 dose:   (Y/N) (see Med list for doses)   Sliding scale Novolog Y/N  If Yes, do you have a baseline novolog pre-meal dose:  units with meals  Patients eat three meals a day:   Y/N    How many episodes of hypoglycemia do you have per week: _______  How many missed doses do you have per week: ______  How many times do you check your blood glucose per day: _______   Any Barriers to therapy - Be specific :  cost of medications, comfortable with giving injections (if applicable), comfortable and confident with current diabetes regimen: Y/N ______________      Prior to Admission medications    Medication Sig Last Dose Taking? Auth Provider   Prenatal Vit-Fe Fumarate-FA (PRENATAL VITAMIN PO) Take 1 tablet by mouth At Bedtime 6/14/2018 at Unknown time Yes Unknown, Entered By History   ibuprofen (ADVIL/MOTRIN) 600 MG tablet Take 600 mg by mouth every 6 hours as needed for moderate pain   Unknown, Entered By History   prochlorperazine (COMPAZINE) 10 MG tablet Take 1 tablet (10 mg) by mouth every 6 hours as needed for nausea or vomiting   Chris Crowder MD          Group Topic:  Behavioral Activation Group    Date: 8/13/2024  Start Time: 11:00 AM  End Time: 11:50 AM  Facilitators: Cristin Randle, OT    Focus: Gold Beneath the Armor  Number in attendance: 6    Patient participated in an activity group on reflecting inwardly to see what boundaries styles/behaviors that we may have that are not serving us. Education provided on how our maladaptive coping skills are not a representation of who we are, more so of how we have survived. Pt shared openly one activity they feel is a part of their \"true self\" & something they are hoping to get back to. Pt shared music as being something he wants to bring into his life more for increased mindfulness. Pt engaged well throughout group.     Group was done for purpose of developing skills for emotional maintenance and overall health management.    Cristin Randle OTR      Method: Group  Attendance: Present  Participation: Active  Patient Response: Attentive  Mood: Normal  Affect: Type: Euthymic (normal mood)   Range: Full (normal)   Congruency: Congruent   Stability: Stable  Behavior/Socialization: Appropriate to group  Thought Process: Tracking  Task Performance: Follows directions  Patient Evaluation: Independent - full participation
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
## 2024-11-01 LAB
HEPATITIS B SURFACE ANTIGEN (EXTERNAL): NEGATIVE
HIV1+2 AB SERPL QL IA: NEGATIVE
RUBELLA ANTIBODY IGG (EXTERNAL): NORMAL

## 2024-11-15 LAB — GROUP B STREPTOCOCCUS (EXTERNAL): NEGATIVE

## 2024-11-27 ENCOUNTER — HOSPITAL ENCOUNTER (OUTPATIENT)
Facility: CLINIC | Age: 39
LOS: 1 days | Discharge: HOME | End: 2024-11-28
Attending: OBSTETRICS & GYNECOLOGY | Admitting: OBSTETRICS & GYNECOLOGY
Payer: COMMERCIAL

## 2024-11-27 ENCOUNTER — TRANSFERRED RECORDS (OUTPATIENT)
Dept: HEALTH INFORMATION MANAGEMENT | Facility: CLINIC | Age: 39
End: 2024-11-27

## 2024-11-27 PROCEDURE — 59025 FETAL NON-STRESS TEST: CPT | Mod: 76

## 2024-11-27 PROCEDURE — 250N000013 HC RX MED GY IP 250 OP 250 PS 637: Performed by: OBSTETRICS & GYNECOLOGY

## 2024-11-27 PROCEDURE — 250N000011 HC RX IP 250 OP 636: Performed by: OBSTETRICS & GYNECOLOGY

## 2024-11-27 RX ORDER — CARBOPROST TROMETHAMINE 250 UG/ML
250 INJECTION, SOLUTION INTRAMUSCULAR
OUTPATIENT
Start: 2024-11-27

## 2024-11-27 RX ORDER — LOPERAMIDE HYDROCHLORIDE 2 MG/1
2 CAPSULE ORAL
OUTPATIENT
Start: 2024-11-27

## 2024-11-27 RX ORDER — MISOPROSTOL 200 UG/1
400 TABLET ORAL
OUTPATIENT
Start: 2024-11-27

## 2024-11-27 RX ORDER — CITRIC ACID/SODIUM CITRATE 334-500MG
30 SOLUTION, ORAL ORAL
OUTPATIENT
Start: 2024-11-27

## 2024-11-27 RX ORDER — LIDOCAINE 40 MG/G
CREAM TOPICAL
OUTPATIENT
Start: 2024-11-27

## 2024-11-27 RX ORDER — SODIUM CHLORIDE, SODIUM LACTATE, POTASSIUM CHLORIDE, CALCIUM CHLORIDE 600; 310; 30; 20 MG/100ML; MG/100ML; MG/100ML; MG/100ML
INJECTION, SOLUTION INTRAVENOUS CONTINUOUS
OUTPATIENT
Start: 2024-11-27

## 2024-11-27 RX ORDER — OXYTOCIN 10 [USP'U]/ML
10 INJECTION, SOLUTION INTRAMUSCULAR; INTRAVENOUS
OUTPATIENT
Start: 2024-11-27

## 2024-11-27 RX ORDER — LOPERAMIDE HYDROCHLORIDE 2 MG/1
4 CAPSULE ORAL
OUTPATIENT
Start: 2024-11-27

## 2024-11-27 RX ORDER — OXYTOCIN/0.9 % SODIUM CHLORIDE 30/500 ML
100-340 PLASTIC BAG, INJECTION (ML) INTRAVENOUS CONTINUOUS PRN
OUTPATIENT
Start: 2024-11-27

## 2024-11-27 RX ORDER — CEFAZOLIN SODIUM/WATER 2 G/20 ML
2 SYRINGE (ML) INTRAVENOUS
OUTPATIENT
Start: 2024-11-27

## 2024-11-27 RX ORDER — MISOPROSTOL 200 UG/1
800 TABLET ORAL
OUTPATIENT
Start: 2024-11-27

## 2024-11-27 RX ORDER — METHYLERGONOVINE MALEATE 0.2 MG/ML
200 INJECTION INTRAVENOUS
OUTPATIENT
Start: 2024-11-27

## 2024-11-27 RX ORDER — CEFAZOLIN SODIUM/WATER 2 G/20 ML
2 SYRINGE (ML) INTRAVENOUS SEE ADMIN INSTRUCTIONS
OUTPATIENT
Start: 2024-11-27

## 2024-11-27 RX ORDER — ACETAMINOPHEN 325 MG/1
975 TABLET ORAL EVERY 4 HOURS PRN
Status: DISCONTINUED | OUTPATIENT
Start: 2024-11-27 | End: 2024-11-28 | Stop reason: HOSPADM

## 2024-11-27 RX ORDER — ACETAMINOPHEN 325 MG/1
975 TABLET ORAL ONCE
OUTPATIENT
Start: 2024-11-27

## 2024-11-27 RX ORDER — TRANEXAMIC ACID 10 MG/ML
1 INJECTION, SOLUTION INTRAVENOUS EVERY 30 MIN PRN
OUTPATIENT
Start: 2024-11-27

## 2024-11-27 RX ORDER — ACETAMINOPHEN 650 MG/1
650 SUPPOSITORY RECTAL EVERY 4 HOURS PRN
Status: DISCONTINUED | OUTPATIENT
Start: 2024-11-27 | End: 2024-11-28 | Stop reason: HOSPADM

## 2024-11-27 RX ORDER — LIDOCAINE 40 MG/G
CREAM TOPICAL
Status: DISCONTINUED | OUTPATIENT
Start: 2024-11-27 | End: 2024-11-28 | Stop reason: HOSPADM

## 2024-11-27 RX ORDER — DEXTROSE, SODIUM CHLORIDE, SODIUM LACTATE, POTASSIUM CHLORIDE, AND CALCIUM CHLORIDE 5; .6; .31; .03; .02 G/100ML; G/100ML; G/100ML; G/100ML; G/100ML
INJECTION, SOLUTION INTRAVENOUS CONTINUOUS
Status: DISCONTINUED | OUTPATIENT
Start: 2024-11-27 | End: 2024-11-28 | Stop reason: HOSPADM

## 2024-11-27 RX ORDER — OXYTOCIN/0.9 % SODIUM CHLORIDE 30/500 ML
340 PLASTIC BAG, INJECTION (ML) INTRAVENOUS CONTINUOUS PRN
OUTPATIENT
Start: 2024-11-27

## 2024-11-27 RX ADMIN — SODIUM CHLORIDE, SODIUM LACTATE, POTASSIUM CHLORIDE, CALCIUM CHLORIDE AND DEXTROSE MONOHYDRATE: 5; 600; 310; 30; 20 INJECTION, SOLUTION INTRAVENOUS at 23:04

## 2024-11-27 RX ADMIN — ACETAMINOPHEN 975 MG: 325 TABLET, FILM COATED ORAL at 23:06

## 2024-11-27 ASSESSMENT — ACTIVITIES OF DAILY LIVING (ADL)
ADLS_ACUITY_SCORE: 41
ADLS_ACUITY_SCORE: 15
DEPENDENT_IADLS:: INDEPENDENT

## 2024-11-28 ENCOUNTER — HOSPITAL ENCOUNTER (INPATIENT)
Facility: CLINIC | Age: 39
LOS: 3 days | Discharge: HOME | End: 2024-12-02
Attending: OBSTETRICS & GYNECOLOGY | Admitting: OBSTETRICS & GYNECOLOGY
Payer: COMMERCIAL

## 2024-11-28 ENCOUNTER — HOSPITAL ENCOUNTER (EMERGENCY)
Facility: CLINIC | Age: 39
End: 2024-11-28
Payer: COMMERCIAL

## 2024-11-28 VITALS
OXYGEN SATURATION: 99 % | TEMPERATURE: 98.6 F | SYSTOLIC BLOOD PRESSURE: 108 MMHG | WEIGHT: 171.4 LBS | DIASTOLIC BLOOD PRESSURE: 75 MMHG | RESPIRATION RATE: 20 BRPM | HEART RATE: 99 BPM

## 2024-11-28 LAB
ABO/RH(D): NORMAL
ANTIBODY SCREEN: NEGATIVE
SPECIMEN EXPIRATION DATE: NORMAL

## 2024-11-28 PROCEDURE — 258N000003 HC RX IP 258 OP 636: Performed by: ANESTHESIOLOGY

## 2024-11-28 RX ADMIN — SODIUM CHLORIDE, POTASSIUM CHLORIDE, SODIUM LACTATE AND CALCIUM CHLORIDE: 600; 310; 30; 20 INJECTION, SOLUTION INTRAVENOUS at 23:55

## 2024-11-28 NOTE — PROVIDER NOTIFICATION
Communication with Dr. Herrera via [employer] messaging.     RN Writer notified MD that the patient has completed the 1L bolus of D5LR and taken 975 of tylenol. She reports feeling better, less hot and less all over pain. Reactive NST. Beaver showing occasional contractions approx every 8-12 minutes. Patient aware of them but describes them as tightness/ pressure, not painful.     MD Herrera okay to discharge home with labor precautions.     RN communicated with patient using Language Line  services and explained the importance of calling MD and returning to hospital if she develops a fever, or has signs of labor starting. Explained the importance of monitoring if current contractions increase in intensity or frequency due to having 2 prior cs and another planned in 2 weeks. Patient verbalized understanding of when to call/come back for evaluation.   Discharged to Home at 00:40.

## 2024-11-28 NOTE — PROVIDER NOTIFICATION
Dr Telma Herrera informed of patient arrival and assessment including the following:    Reason for maternal/fetal assessment overall unwell feeling including hot flash and body aches. Pt reports detailed in previous note. Fetal status normal baseline, moderate variability, accelerations present and no decelerations. Plan per provider/orders received for IV hydration, 1 L of D5LR, and 975mg of tylenol, NST.  Report to MD if contractions increase in frequency and intensity. Check in with patient after tylenol and IV fluids to see if symptoms improve.

## 2024-11-28 NOTE — CARE PLAN
Data: [facility] presented to [facility]: 2024  9:37 PM.  Reason for maternal/fetal assessment is  feeling hot all over, like hot flashes and pain in her back and all over her body . Patient reports she was seen in clinic this morning and was feeling fine but now is feeling pain all over her body and feeling very hot. Patient denies uterine contractions, leaking of vaginal fluid/rupture of membranes, vaginal bleeding, abdominal pain, pelvic pressure, vomiting, headache, visual disturbances, epigastric or RUQ pain, significant edema. Patient reports fetal movement is normal. Patient is a 37w5d .  Prenatal record reviewed. Pregnancy has been complicated by advanced maternal age (>=34yo) and grand multip, repeat  .    Vital signs wnl. Support person is not present.     Action: Verbal consent for EFM. Triage assessment completed.     Response: Patient verbalized agreement with plan. Will contact Dr. Herrera with update and further orders.

## 2024-11-29 ENCOUNTER — APPOINTMENT (OUTPATIENT)
Dept: GENERAL RADIOLOGY | Facility: CLINIC | Age: 39
End: 2024-11-29
Attending: OBSTETRICS & GYNECOLOGY
Payer: COMMERCIAL

## 2024-11-29 LAB
ERYTHROCYTE [DISTWIDTH] IN BLOOD BY AUTOMATED COUNT: 12.9 % (ref 10–15)
HCT VFR BLD AUTO: 44.4 % (ref 35–47)
HGB BLD-MCNC: 12.3 G/DL (ref 11.7–15.7)
HGB BLD-MCNC: 14.8 G/DL (ref 11.7–15.7)
MCH RBC QN AUTO: 30.1 PG (ref 26.5–33)
MCHC RBC AUTO-ENTMCNC: 33.3 G/DL (ref 31.5–36.5)
MCV RBC AUTO: 90 FL (ref 78–100)
PLATELET # BLD AUTO: 168 10E3/UL (ref 150–450)
RBC # BLD AUTO: 4.92 10E6/UL (ref 3.8–5.2)
T PALLIDUM AB SER QL: NONREACTIVE
WBC # BLD AUTO: 8.4 10E3/UL (ref 4–11)

## 2024-11-29 PROCEDURE — 59514 CESAREAN DELIVERY ONLY: CPT | Performed by: OBSTETRICS & GYNECOLOGY

## 2024-11-29 PROCEDURE — 710N000009 HC RECOVERY PHASE 1, LEVEL 1, PER MIN: Performed by: OBSTETRICS & GYNECOLOGY

## 2024-11-29 PROCEDURE — 250N000009 HC RX 250: Performed by: OBSTETRICS & GYNECOLOGY

## 2024-11-29 PROCEDURE — 999N000080 HC STATISTIC IP LACTATION SERVICES 16-30 MIN

## 2024-11-29 PROCEDURE — 86850 RBC ANTIBODY SCREEN: CPT | Performed by: OBSTETRICS & GYNECOLOGY

## 2024-11-29 PROCEDURE — 86780 TREPONEMA PALLIDUM: CPT | Performed by: OBSTETRICS & GYNECOLOGY

## 2024-11-29 PROCEDURE — 370N000017 HC ANESTHESIA TECHNICAL FEE, PER MIN: Performed by: OBSTETRICS & GYNECOLOGY

## 2024-11-29 PROCEDURE — 999N000063 XR ABDOMEN PORT 1 VIEW

## 2024-11-29 PROCEDURE — 999N000016 HC STATISTIC ATTENDANCE AT DELIVERY

## 2024-11-29 PROCEDURE — 250N000011 HC RX IP 250 OP 636: Performed by: OBSTETRICS & GYNECOLOGY

## 2024-11-29 PROCEDURE — 85027 COMPLETE CBC AUTOMATED: CPT | Performed by: OBSTETRICS & GYNECOLOGY

## 2024-11-29 PROCEDURE — 250N000013 HC RX MED GY IP 250 OP 250 PS 637: Performed by: OBSTETRICS & GYNECOLOGY

## 2024-11-29 PROCEDURE — 88307 TISSUE EXAM BY PATHOLOGIST: CPT | Mod: TC | Performed by: OBSTETRICS & GYNECOLOGY

## 2024-11-29 PROCEDURE — 85018 HEMOGLOBIN: CPT | Performed by: OBSTETRICS & GYNECOLOGY

## 2024-11-29 PROCEDURE — 36415 COLL VENOUS BLD VENIPUNCTURE: CPT | Performed by: OBSTETRICS & GYNECOLOGY

## 2024-11-29 PROCEDURE — 272N000001 HC OR GENERAL SUPPLY STERILE: Performed by: OBSTETRICS & GYNECOLOGY

## 2024-11-29 PROCEDURE — 250N000011 HC RX IP 250 OP 636: Performed by: ANESTHESIOLOGY

## 2024-11-29 PROCEDURE — 360N000076 HC SURGERY LEVEL 3, PER MIN: Performed by: OBSTETRICS & GYNECOLOGY

## 2024-11-29 PROCEDURE — 120N000001 HC R&B MED SURG/OB

## 2024-11-29 PROCEDURE — 250N000013 HC RX MED GY IP 250 OP 250 PS 637: Performed by: ANESTHESIOLOGY

## 2024-11-29 PROCEDURE — 86900 BLOOD TYPING SEROLOGIC ABO: CPT | Performed by: OBSTETRICS & GYNECOLOGY

## 2024-11-29 PROCEDURE — 250N000025 HC SEVOFLURANE, PER MIN: Performed by: OBSTETRICS & GYNECOLOGY

## 2024-11-29 PROCEDURE — 88307 TISSUE EXAM BY PATHOLOGIST: CPT | Mod: 26 | Performed by: PATHOLOGY

## 2024-11-29 RX ORDER — NALOXONE HYDROCHLORIDE 0.4 MG/ML
0.4 INJECTION, SOLUTION INTRAMUSCULAR; INTRAVENOUS; SUBCUTANEOUS
Status: DISCONTINUED | OUTPATIENT
Start: 2024-11-29 | End: 2024-12-02 | Stop reason: HOSPADM

## 2024-11-29 RX ORDER — HYDROCORTISONE 25 MG/G
CREAM TOPICAL 3 TIMES DAILY PRN
Status: DISCONTINUED | OUTPATIENT
Start: 2024-11-29 | End: 2024-12-02 | Stop reason: HOSPADM

## 2024-11-29 RX ORDER — FENTANYL CITRATE 50 UG/ML
50 INJECTION, SOLUTION INTRAMUSCULAR; INTRAVENOUS EVERY 5 MIN PRN
Status: DISCONTINUED | OUTPATIENT
Start: 2024-11-29 | End: 2024-11-29 | Stop reason: HOSPADM

## 2024-11-29 RX ORDER — OXYCODONE HYDROCHLORIDE 5 MG/1
5 TABLET ORAL EVERY 4 HOURS PRN
Status: DISCONTINUED | OUTPATIENT
Start: 2024-11-29 | End: 2024-12-02 | Stop reason: HOSPADM

## 2024-11-29 RX ORDER — SIMETHICONE 80 MG
80 TABLET,CHEWABLE ORAL 4 TIMES DAILY PRN
Status: DISCONTINUED | OUTPATIENT
Start: 2024-11-29 | End: 2024-12-02 | Stop reason: HOSPADM

## 2024-11-29 RX ORDER — HYDROMORPHONE HYDROCHLORIDE 1 MG/ML
0.5 INJECTION, SOLUTION INTRAMUSCULAR; INTRAVENOUS; SUBCUTANEOUS
Status: DISCONTINUED | OUTPATIENT
Start: 2024-11-29 | End: 2024-11-29 | Stop reason: HOSPADM

## 2024-11-29 RX ORDER — OXYTOCIN/0.9 % SODIUM CHLORIDE 30/500 ML
340 PLASTIC BAG, INJECTION (ML) INTRAVENOUS CONTINUOUS PRN
Status: DISCONTINUED | OUTPATIENT
Start: 2024-11-29 | End: 2024-12-02 | Stop reason: HOSPADM

## 2024-11-29 RX ORDER — SODIUM CHLORIDE, SODIUM LACTATE, POTASSIUM CHLORIDE, CALCIUM CHLORIDE 600; 310; 30; 20 MG/100ML; MG/100ML; MG/100ML; MG/100ML
INJECTION, SOLUTION INTRAVENOUS CONTINUOUS
Status: DISCONTINUED | OUTPATIENT
Start: 2024-11-29 | End: 2024-11-29 | Stop reason: HOSPADM

## 2024-11-29 RX ORDER — ONDANSETRON 2 MG/ML
4 INJECTION INTRAMUSCULAR; INTRAVENOUS EVERY 6 HOURS PRN
Status: DISCONTINUED | OUTPATIENT
Start: 2024-11-29 | End: 2024-12-02 | Stop reason: HOSPADM

## 2024-11-29 RX ORDER — AMOXICILLIN 250 MG
2 CAPSULE ORAL 2 TIMES DAILY
Status: DISCONTINUED | OUTPATIENT
Start: 2024-11-29 | End: 2024-12-02 | Stop reason: HOSPADM

## 2024-11-29 RX ORDER — NALOXONE HYDROCHLORIDE 0.4 MG/ML
0.1 INJECTION, SOLUTION INTRAMUSCULAR; INTRAVENOUS; SUBCUTANEOUS
Status: DISCONTINUED | OUTPATIENT
Start: 2024-11-29 | End: 2024-11-29 | Stop reason: HOSPADM

## 2024-11-29 RX ORDER — ONDANSETRON 4 MG/1
4 TABLET, ORALLY DISINTEGRATING ORAL EVERY 6 HOURS PRN
Status: DISCONTINUED | OUTPATIENT
Start: 2024-11-29 | End: 2024-12-02 | Stop reason: HOSPADM

## 2024-11-29 RX ORDER — OXYTOCIN 10 [USP'U]/ML
10 INJECTION, SOLUTION INTRAMUSCULAR; INTRAVENOUS
Status: DISCONTINUED | OUTPATIENT
Start: 2024-11-29 | End: 2024-12-02 | Stop reason: HOSPADM

## 2024-11-29 RX ORDER — KETOROLAC TROMETHAMINE 30 MG/ML
30 INJECTION, SOLUTION INTRAMUSCULAR; INTRAVENOUS EVERY 6 HOURS
Status: COMPLETED | OUTPATIENT
Start: 2024-11-29 | End: 2024-11-29

## 2024-11-29 RX ORDER — DEXAMETHASONE SODIUM PHOSPHATE 4 MG/ML
4 INJECTION, SOLUTION INTRA-ARTICULAR; INTRALESIONAL; INTRAMUSCULAR; INTRAVENOUS; SOFT TISSUE
Status: DISCONTINUED | OUTPATIENT
Start: 2024-11-29 | End: 2024-11-29

## 2024-11-29 RX ORDER — PROCHLORPERAZINE MALEATE 10 MG
10 TABLET ORAL EVERY 6 HOURS PRN
Status: DISCONTINUED | OUTPATIENT
Start: 2024-11-29 | End: 2024-12-02 | Stop reason: HOSPADM

## 2024-11-29 RX ORDER — METHYLERGONOVINE MALEATE 0.2 MG/ML
200 INJECTION INTRAVENOUS
Status: DISCONTINUED | OUTPATIENT
Start: 2024-11-29 | End: 2024-12-02 | Stop reason: HOSPADM

## 2024-11-29 RX ORDER — HYDROMORPHONE HCL IN WATER/PF 6 MG/30 ML
0.4 PATIENT CONTROLLED ANALGESIA SYRINGE INTRAVENOUS EVERY 5 MIN PRN
Status: DISCONTINUED | OUTPATIENT
Start: 2024-11-29 | End: 2024-11-29

## 2024-11-29 RX ORDER — METOCLOPRAMIDE 10 MG/1
10 TABLET ORAL EVERY 6 HOURS PRN
Status: DISCONTINUED | OUTPATIENT
Start: 2024-11-29 | End: 2024-12-02 | Stop reason: HOSPADM

## 2024-11-29 RX ORDER — AMPICILLIN AND SULBACTAM 2; 1 G/1; G/1
3 INJECTION, POWDER, FOR SOLUTION INTRAMUSCULAR; INTRAVENOUS EVERY 6 HOURS
Status: COMPLETED | OUTPATIENT
Start: 2024-11-29 | End: 2024-11-30

## 2024-11-29 RX ORDER — MAGNESIUM HYDROXIDE 1200 MG/15ML
LIQUID ORAL PRN
Status: DISCONTINUED | OUTPATIENT
Start: 2024-11-29 | End: 2024-11-29

## 2024-11-29 RX ORDER — ONDANSETRON 2 MG/ML
4 INJECTION INTRAMUSCULAR; INTRAVENOUS EVERY 30 MIN PRN
Status: DISCONTINUED | OUTPATIENT
Start: 2024-11-29 | End: 2024-11-29

## 2024-11-29 RX ORDER — OXYCODONE HYDROCHLORIDE 10 MG/1
10 TABLET ORAL
Status: DISCONTINUED | OUTPATIENT
Start: 2024-11-29 | End: 2024-11-29

## 2024-11-29 RX ORDER — OXYCODONE HYDROCHLORIDE 5 MG/1
5 TABLET ORAL
Status: COMPLETED | OUTPATIENT
Start: 2024-11-29 | End: 2024-11-29

## 2024-11-29 RX ORDER — ONDANSETRON 4 MG/1
4 TABLET, ORALLY DISINTEGRATING ORAL EVERY 30 MIN PRN
Status: DISCONTINUED | OUTPATIENT
Start: 2024-11-29 | End: 2024-11-29

## 2024-11-29 RX ORDER — PRENATAL VIT/IRON FUM/FOLIC AC 27MG-0.8MG
1 TABLET ORAL AT BEDTIME
Status: DISCONTINUED | OUTPATIENT
Start: 2024-11-29 | End: 2024-12-02 | Stop reason: HOSPADM

## 2024-11-29 RX ORDER — HYDROMORPHONE HCL IN WATER/PF 6 MG/30 ML
0.2 PATIENT CONTROLLED ANALGESIA SYRINGE INTRAVENOUS EVERY 5 MIN PRN
Status: DISCONTINUED | OUTPATIENT
Start: 2024-11-29 | End: 2024-11-29

## 2024-11-29 RX ORDER — DEXAMETHASONE SODIUM PHOSPHATE 4 MG/ML
4 INJECTION, SOLUTION INTRA-ARTICULAR; INTRALESIONAL; INTRAMUSCULAR; INTRAVENOUS; SOFT TISSUE
Status: DISCONTINUED | OUTPATIENT
Start: 2024-11-29 | End: 2024-11-29 | Stop reason: HOSPADM

## 2024-11-29 RX ORDER — MODIFIED LANOLIN
OINTMENT (GRAM) TOPICAL
Status: DISCONTINUED | OUTPATIENT
Start: 2024-11-29 | End: 2024-12-02 | Stop reason: HOSPADM

## 2024-11-29 RX ORDER — CARBOPROST TROMETHAMINE 250 UG/ML
250 INJECTION, SOLUTION INTRAMUSCULAR
Status: DISCONTINUED | OUTPATIENT
Start: 2024-11-29 | End: 2024-12-02 | Stop reason: HOSPADM

## 2024-11-29 RX ORDER — METHYLERGONOVINE MALEATE 0.2 MG/ML
200 INJECTION INTRAVENOUS
Status: DISCONTINUED | OUTPATIENT
Start: 2024-11-29 | End: 2024-11-29

## 2024-11-29 RX ORDER — MISOPROSTOL 200 UG/1
800 TABLET ORAL
Status: DISCONTINUED | OUTPATIENT
Start: 2024-11-29 | End: 2024-12-02 | Stop reason: HOSPADM

## 2024-11-29 RX ORDER — METOCLOPRAMIDE HYDROCHLORIDE 5 MG/ML
10 INJECTION INTRAMUSCULAR; INTRAVENOUS EVERY 6 HOURS PRN
Status: DISCONTINUED | OUTPATIENT
Start: 2024-11-29 | End: 2024-12-02 | Stop reason: HOSPADM

## 2024-11-29 RX ORDER — BENZONATATE 100 MG/1
100 CAPSULE ORAL 3 TIMES DAILY PRN
Status: DISCONTINUED | OUTPATIENT
Start: 2024-11-29 | End: 2024-12-02 | Stop reason: HOSPADM

## 2024-11-29 RX ORDER — NALOXONE HYDROCHLORIDE 0.4 MG/ML
0.2 INJECTION, SOLUTION INTRAMUSCULAR; INTRAVENOUS; SUBCUTANEOUS
Status: DISCONTINUED | OUTPATIENT
Start: 2024-11-29 | End: 2024-12-02 | Stop reason: HOSPADM

## 2024-11-29 RX ORDER — DEXTROSE, SODIUM CHLORIDE, SODIUM LACTATE, POTASSIUM CHLORIDE, AND CALCIUM CHLORIDE 5; .6; .31; .03; .02 G/100ML; G/100ML; G/100ML; G/100ML; G/100ML
INJECTION, SOLUTION INTRAVENOUS CONTINUOUS
Status: DISCONTINUED | OUTPATIENT
Start: 2024-11-29 | End: 2024-12-02 | Stop reason: HOSPADM

## 2024-11-29 RX ORDER — TRANEXAMIC ACID 10 MG/ML
1 INJECTION, SOLUTION INTRAVENOUS EVERY 30 MIN PRN
Status: DISCONTINUED | OUTPATIENT
Start: 2024-11-29 | End: 2024-12-02 | Stop reason: HOSPADM

## 2024-11-29 RX ORDER — AMOXICILLIN 250 MG
1 CAPSULE ORAL 2 TIMES DAILY
Status: DISCONTINUED | OUTPATIENT
Start: 2024-11-29 | End: 2024-12-02 | Stop reason: HOSPADM

## 2024-11-29 RX ORDER — LOPERAMIDE HYDROCHLORIDE 2 MG/1
4 CAPSULE ORAL
Status: DISCONTINUED | OUTPATIENT
Start: 2024-11-29 | End: 2024-12-02 | Stop reason: HOSPADM

## 2024-11-29 RX ORDER — FENTANYL CITRATE 50 UG/ML
25 INJECTION, SOLUTION INTRAMUSCULAR; INTRAVENOUS EVERY 5 MIN PRN
Status: DISCONTINUED | OUTPATIENT
Start: 2024-11-29 | End: 2024-11-29 | Stop reason: HOSPADM

## 2024-11-29 RX ORDER — BISACODYL 10 MG
10 SUPPOSITORY, RECTAL RECTAL DAILY PRN
Status: DISCONTINUED | OUTPATIENT
Start: 2024-12-01 | End: 2024-12-02 | Stop reason: HOSPADM

## 2024-11-29 RX ORDER — ACETAMINOPHEN 325 MG/1
975 TABLET ORAL EVERY 6 HOURS
Status: DISCONTINUED | OUTPATIENT
Start: 2024-11-29 | End: 2024-12-02 | Stop reason: HOSPADM

## 2024-11-29 RX ORDER — MISOPROSTOL 200 UG/1
400 TABLET ORAL
Status: DISCONTINUED | OUTPATIENT
Start: 2024-11-29 | End: 2024-12-02 | Stop reason: HOSPADM

## 2024-11-29 RX ORDER — NALOXONE HYDROCHLORIDE 0.4 MG/ML
0.1 INJECTION, SOLUTION INTRAMUSCULAR; INTRAVENOUS; SUBCUTANEOUS
Status: DISCONTINUED | OUTPATIENT
Start: 2024-11-29 | End: 2024-11-29

## 2024-11-29 RX ORDER — IBUPROFEN 800 MG/1
800 TABLET, FILM COATED ORAL EVERY 6 HOURS
Status: DISCONTINUED | OUTPATIENT
Start: 2024-11-30 | End: 2024-12-02 | Stop reason: HOSPADM

## 2024-11-29 RX ORDER — LIDOCAINE 40 MG/G
CREAM TOPICAL
Status: DISCONTINUED | OUTPATIENT
Start: 2024-11-29 | End: 2024-12-02 | Stop reason: HOSPADM

## 2024-11-29 RX ORDER — LOPERAMIDE HYDROCHLORIDE 2 MG/1
2 CAPSULE ORAL
Status: DISCONTINUED | OUTPATIENT
Start: 2024-11-29 | End: 2024-12-02 | Stop reason: HOSPADM

## 2024-11-29 RX ORDER — SODIUM PHOSPHATE,MONO-DIBASIC 19G-7G/118
1 ENEMA (ML) RECTAL DAILY PRN
Status: DISCONTINUED | OUTPATIENT
Start: 2024-12-01 | End: 2024-12-02 | Stop reason: HOSPADM

## 2024-11-29 RX ORDER — NALBUPHINE HYDROCHLORIDE 10 MG/ML
10 INJECTION INTRAMUSCULAR; INTRAVENOUS; SUBCUTANEOUS EVERY 4 HOURS PRN
Status: DISCONTINUED | OUTPATIENT
Start: 2024-11-29 | End: 2024-12-02 | Stop reason: HOSPADM

## 2024-11-29 RX ADMIN — HYDROMORPHONE HYDROCHLORIDE 0.5 MG: 1 INJECTION, SOLUTION INTRAMUSCULAR; INTRAVENOUS; SUBCUTANEOUS at 02:23

## 2024-11-29 RX ADMIN — SODIUM CHLORIDE, SODIUM LACTATE, POTASSIUM CHLORIDE, CALCIUM CHLORIDE AND DEXTROSE MONOHYDRATE: 5; 600; 310; 30; 20 INJECTION, SOLUTION INTRAVENOUS at 03:06

## 2024-11-29 RX ADMIN — ACETAMINOPHEN 975 MG: 325 TABLET, FILM COATED ORAL at 03:36

## 2024-11-29 RX ADMIN — ACETAMINOPHEN 975 MG: 325 TABLET, FILM COATED ORAL at 15:39

## 2024-11-29 RX ADMIN — ACETAMINOPHEN 975 MG: 325 TABLET, FILM COATED ORAL at 22:04

## 2024-11-29 RX ADMIN — SIMETHICONE 80 MG: 80 TABLET, CHEWABLE ORAL at 09:35

## 2024-11-29 RX ADMIN — OXYCODONE HYDROCHLORIDE 5 MG: 5 TABLET ORAL at 23:44

## 2024-11-29 RX ADMIN — KETOROLAC TROMETHAMINE 30 MG: 30 INJECTION, SOLUTION INTRAMUSCULAR at 06:36

## 2024-11-29 RX ADMIN — METHYLERGONOVINE MALEATE 200 MCG: 0.2 INJECTION, SOLUTION INTRAMUSCULAR; INTRAVENOUS at 04:42

## 2024-11-29 RX ADMIN — SENNOSIDES AND DOCUSATE SODIUM 2 TABLET: 50; 8.6 TABLET ORAL at 09:35

## 2024-11-29 RX ADMIN — PRENATAL VITAMINS-IRON FUMARATE 27 MG IRON-FOLIC ACID 0.8 MG TABLET 1 TABLET: at 09:35

## 2024-11-29 RX ADMIN — OXYCODONE HYDROCHLORIDE 5 MG: 5 TABLET ORAL at 06:35

## 2024-11-29 RX ADMIN — AMPICILLIN SODIUM AND SULBACTAM SODIUM 3 G: 2; 1 INJECTION, POWDER, FOR SOLUTION INTRAMUSCULAR; INTRAVENOUS at 18:48

## 2024-11-29 RX ADMIN — ACETAMINOPHEN 975 MG: 325 TABLET, FILM COATED ORAL at 09:35

## 2024-11-29 RX ADMIN — OXYCODONE HYDROCHLORIDE 5 MG: 5 TABLET ORAL at 11:47

## 2024-11-29 RX ADMIN — OXYCODONE HYDROCHLORIDE 5 MG: 5 TABLET ORAL at 19:33

## 2024-11-29 RX ADMIN — FENTANYL CITRATE 50 MCG: 50 INJECTION, SOLUTION INTRAMUSCULAR; INTRAVENOUS at 01:03

## 2024-11-29 RX ADMIN — FENTANYL CITRATE 25 MCG: 50 INJECTION, SOLUTION INTRAMUSCULAR; INTRAVENOUS at 01:33

## 2024-11-29 RX ADMIN — NALBUPHINE HYDROCHLORIDE 10 MG: 10 INJECTION, SOLUTION INTRAMUSCULAR; INTRAVENOUS; SUBCUTANEOUS at 03:36

## 2024-11-29 RX ADMIN — AMPICILLIN SODIUM AND SULBACTAM SODIUM 3 G: 2; 1 INJECTION, POWDER, FOR SOLUTION INTRAMUSCULAR; INTRAVENOUS at 11:48

## 2024-11-29 RX ADMIN — KETOROLAC TROMETHAMINE 30 MG: 30 INJECTION, SOLUTION INTRAMUSCULAR at 12:46

## 2024-11-29 RX ADMIN — SENNOSIDES AND DOCUSATE SODIUM 1 TABLET: 8.6; 5 TABLET ORAL at 22:04

## 2024-11-29 RX ADMIN — AMPICILLIN SODIUM AND SULBACTAM SODIUM 3 G: 2; 1 INJECTION, POWDER, FOR SOLUTION INTRAMUSCULAR; INTRAVENOUS at 05:25

## 2024-11-29 RX ADMIN — OXYCODONE HYDROCHLORIDE 5 MG: 5 TABLET ORAL at 02:22

## 2024-11-29 RX ADMIN — FENTANYL CITRATE 25 MCG: 50 INJECTION, SOLUTION INTRAMUSCULAR; INTRAVENOUS at 01:14

## 2024-11-29 RX ADMIN — Medication 340 ML/HR: at 04:17

## 2024-11-29 RX ADMIN — BENZONATATE 100 MG: 100 CAPSULE ORAL at 13:41

## 2024-11-29 RX ADMIN — KETOROLAC TROMETHAMINE 30 MG: 30 INJECTION, SOLUTION INTRAMUSCULAR at 18:47

## 2024-11-29 ASSESSMENT — ACTIVITIES OF DAILY LIVING (ADL)
ADLS_ACUITY_SCORE: 29
ADLS_ACUITY_SCORE: 15
ADLS_ACUITY_SCORE: 32
ADLS_ACUITY_SCORE: 29
ADLS_ACUITY_SCORE: 29
ADLS_ACUITY_SCORE: 20
ADLS_ACUITY_SCORE: 20
ADLS_ACUITY_SCORE: 32
ADLS_ACUITY_SCORE: 33
ADLS_ACUITY_SCORE: 29
ADLS_ACUITY_SCORE: 20
ADLS_ACUITY_SCORE: 32
ADLS_ACUITY_SCORE: 33
ADLS_ACUITY_SCORE: 32
ADLS_ACUITY_SCORE: 32
ADLS_ACUITY_SCORE: 20
ADLS_ACUITY_SCORE: 32
ADLS_ACUITY_SCORE: 19
ADLS_ACUITY_SCORE: 32

## 2024-11-29 NOTE — PROVIDER NOTIFICATION
11/28/24 4756   Provider Notification   Provider Name/Title Dr. PRITI Lane (in house)   Method of Notification Phone   Request Evaluate in Person   Notification Reason Bleeding       MD notified of pt presenting to triage with significant vaginal bleeding, requesting presence at bedside now.

## 2024-11-29 NOTE — PROVIDER NOTIFICATION
11/29/24 0317   Provider Notification   Provider Name/Title Dr. Bruner   Method of Notification Electronic Page   Request Evaluate-Remote   Notification Reason Medication Request;Status Update     Patient complained of itchiness. MD to put orders in for Nubain for itchiness and antibiotic therapy.

## 2024-11-29 NOTE — OP NOTE
Manish Lane dictating first half of stat  for Christiane Perez  MR #[id number]  Date of birth 1985  Today's date     Pre-op diagnosis: Third trimester bleeding, nonreassuring fetal heart tracing, presumptive placental abruption  Postop diagnosis: Third trimester bleeding, nonreassuring tracing, severe adhesions from the subcu layers continuous with the anterior peritoneum.  Procedure: Emergency   Surgeon: Dr. Manish Lane operating through the emergency delivery of the baby  Dr. Hilda Howell joining mid surgery  Anesthesia: General  Complications: Uterus repaired in multiple layers secondary to adhesive disease rating no separation of planes  Findings: As noted above in terms of maternal anatomy, baby with Apgars of 7, 7, 9 at 1 5 and 10 respectively  Indications:  Patient is a 39-year-old -0-0-6 having had at least 2 previous C-sections presented to labor and delivery with third trimester bleeding that was moderate.  Baby heart tones were difficult to confirm.  After approximately 5 minutes there was uncertainty whether the heart tones were that of the mothers or that of the babies.  A quick bedside ultrasound identified a viable baby with heart tones in the 140s but no continuous correlating's fetal heart rate tracing.  Emergency  was called patient brought to the OR    Operative procedures as follows:    After adequate general anesthesia the patient was prepped and draped in the usual sterile fashion, in this case Betadine splash.  Merchant had previously been placed and patient consented for procedure.      Pfannenstiel skin incision was made through previous scar and fascia was able to be identified through the scarred subcutaneous tissue as well as diminished ability to identify fascia from rectus muscles.  The rectus muscles were  the midline.    Peritoneum was not able to be identified and there was significant scarring across the anterior  surface of the uterus.  A large enough window was created to assure that the bladder was out of the dissection field and the uterus was entered bluntly.    Baby was delivered from vertex presentation.  Mouth and there is suction cord doubly clamped and cut and baby passed off the pediatric team in attendance.    At that point Dr. Howell was in the room and available for assistance in this case continue the case while I assisted her.    See the rest of her op note to be done by Dr. Howell

## 2024-11-29 NOTE — PROVIDER NOTIFICATION
24 [id number]   Provider Notification   Provider Name/Title    Method of Notification At Bedside        in house OB at bedside. Bedside ultrasound done, FHR WNL. Plan for stat .

## 2024-11-29 NOTE — PROVIDER NOTIFICATION
Dr. Wells called for concerns of breakthrough pain in Phase 2 and requesting additional pain management orders. [facility] will place additional orders. TORB to start with dilaudid per MAR and then can give PO oxy per MAR.

## 2024-11-29 NOTE — OP NOTE
Operative Note    Pre-op Dx:  38yo  with heavy vaginal bleeding at 38 weeks gestation    Suspected uterine rupture    Post-op Dx:  Same        Procedure: Repeat Low Transverse  Section    Surgeon: Marjan Bruner    Co-Surgeon: Manish Lane    Anesthesia: General endotracheal    QBL: 552cc    Indications: I arrived after being paged urgently to come to the hospital due to the patient arriving with heavy vaginal bleeding. Upon arrival, Dr. Lane (the in-house physician) had started the  section and the infant was delivered as I entered the operating room. There was a suspected abruption, though Dr. Lane didn't note seeing a discrete rupture.     Findings: 6 #, 8 oz male infant. Apgars were  7 at one minute and 7 at five minutes. Normal uterus, tubes and ovaries.     Procedure: (upon my arrival) Cord blood was sent for gases. The placenta was delivered manually. The uterus was partially exteriorized (due to extensive scarring) and cleared of all clots and debris. The uterine defect was wide and had extended vertically. The hysterotomy was then repaired with 0-Vicryl in multiple, locked layers. Good hemostasis was noted after Surgicel was applied.     The uterus was then returned to the abdomen and the gutters were irrigated. The uterine incision was inspected again and noted to be hemostatic. The fascia was re-approximated with 0-Vicryl in a running fashion. The subcutaneous tissue was irrigated and hemostasis was ensured with cautery.   Subcutaneous layer was closed with 3-0 Vicryl in running suture.The skin was closed with absorbable staples.      The patient tolerated the procedure well. Sponge, lap and needle counts were correct at the end of the case and the patient was taken to the recovery room in stable condition.     Dr. Marjan Bruner

## 2024-11-29 NOTE — PROVIDER NOTIFICATION
11/29/24 0420   Provider Notification   Provider Name/Title Dr. Bruner   Method of Notification Electronic Page   Request Evaluate-Remote   Notification Reason Medication Request;Status Update     Patient with increased bleeding with some small clots upon transfer to PP room. Writer hung second bag of Pitocin for 340 mL/hr. MD ordered to give Methergine 200 mcg IM and cytotec 800 mcg rectally.

## 2024-11-29 NOTE — PLAN OF CARE
Goal Outcome Evaluation:      Plan of Care Reviewed With: patient    Overall Patient Progress: improvingOverall Patient Progress: improving         0350H: Transferred patient to  via cart, baby in bassinet. Belongings returned. Accompanied by  and family.         Patient had some increased bleeding upon transfer to , clots noted. Uterus has remained contracted the whole time. Patient very sensitive with fundal checks (patient would push away writer's hand during fundal checks). Second bag of Pitocin hung. Methergine IM given. Cytotec refused, MD aware. Tylenol, Toradol and PRN oxycodone given for pain. Nubain given for itching. Dressing with moderate drainage but intact. FC draining, adequate urine output. DTR's normal, no clonus. SCD's on. Formula-feeding for now.  present at bedside.

## 2024-11-29 NOTE — H&P
Patient arrived to unit at 38 weeks gestation with acute vaginal bleeding. Patient was taken urgently to OR by in house physician--see operative note.    Vitals reviewed.  Per RN, patient in pain  CV--tachycardic upon arrival  SVE--heavy bleeding, no cervical exam performed.     A/P: 38yo  with acute vaginal bleeding and suspected uterine rupture.   --Taken urgently to the OR for STAT  section.     Marjan Bruner MD

## 2024-11-29 NOTE — PROGRESS NOTES
OB Postpartum Note    S:  Patient seen with [facility] . Feeling okay, but more pain then after prior  section. Some chest pressure, but no shortness of breath. Gonzalez in, hasn't been up yet. Formula feeding.     O:  Vitals were reviewed  Blood pressure 121/82, pulse 88, temperature 98.5  F (36.9  C), temperature source Oral, resp. rate 20, last menstrual period 2024, SpO2 99%, unknown if currently breastfeeding.        Gen - NAD, resting        CV - RRR        Abdomen - Fundus firm, below umbilicus, nontender        Incision - C/D/I        Extremities - No calf tenderness, no unilateral edema    O POS  Hemoglobin   Date Value Ref Range Status   2024 12.3 11.7 - 15.7 g/dL Final   2024 14.8 11.7 - 15.7 g/dL Final   07/15/2020 13.8 11.7 - 15.7 g/dL Final   2019 14.5 11.7 - 15.7 g/dL Final         A:   Postoperative Day #0 , s/p emergent repeat CS for abruption- doing well    P:  1)  Routine care--plan to ambulate today slowly, remove gonzalez only when comfortable up.         2)  inpatient care for at least 2 days post-surgery.        Marjan Bruner MD

## 2024-11-29 NOTE — LACTATION NOTE
Lactation visit with Lorna. Baby number 7 for patient. Patient has been giving mainly bottles. With spouse in room interpreting discussed need to get infant to breast. Plan for writer to come back at 1600 to assist with a feed.    1620 baby brought to the breast STS. Baby sucking on tongue. Small misting of colostrum seen with expression. Baby did open a small amount to be able to latch. Writer pulled down on chin to widen latch. Baby active and sucking. Few swallows heard. Encouraged to put baby to breast frequently.

## 2024-11-29 NOTE — PROVIDER NOTIFICATION
24 [id number]   Provider Notification   Provider Name/Title Dr. Bruner   Method of Notification Phone   Request Evaluate in Person   Notification Reason Bleeding       Notified MD of pt arrival, , planned repeat , presents with significant vaginal bleeding, bedside RN requesting MD at the bedside, in house OB is at the bedside now.     TORB for rainbow blood draw and prep for a . MD will be at bedside shortly.

## 2024-11-29 NOTE — PROGRESS NOTES
Patient arrived for complaints of vaginal bleeding and abdominal pain, found to have skirt with bright red bleeding, and hard abdomen. Patient reporting severe pain. Family at bedside providing language interpretation per patient request. In house, Dr. Manish Lane called to bedside. Dr. Bruner paged to come to hospital. EFM placed with patient consent and tracing HR of 115- 125, however pulse oximetry displays maternal . 18 g PIV established labs drawn    CODE C/S called at 1230

## 2024-11-30 PROCEDURE — 250N000011 HC RX IP 250 OP 636: Performed by: OBSTETRICS & GYNECOLOGY

## 2024-11-30 PROCEDURE — 120N000001 HC R&B MED SURG/OB

## 2024-11-30 PROCEDURE — 999N000080 HC STATISTIC IP LACTATION SERVICES 16-30 MIN

## 2024-11-30 PROCEDURE — 250N000013 HC RX MED GY IP 250 OP 250 PS 637: Performed by: OBSTETRICS & GYNECOLOGY

## 2024-11-30 RX ADMIN — OXYCODONE HYDROCHLORIDE 5 MG: 5 TABLET ORAL at 16:39

## 2024-11-30 RX ADMIN — ACETAMINOPHEN 975 MG: 325 TABLET, FILM COATED ORAL at 03:38

## 2024-11-30 RX ADMIN — ACETAMINOPHEN 975 MG: 325 TABLET, FILM COATED ORAL at 11:11

## 2024-11-30 RX ADMIN — AMPICILLIN SODIUM AND SULBACTAM SODIUM 3 G: 2; 1 INJECTION, POWDER, FOR SOLUTION INTRAMUSCULAR; INTRAVENOUS at 00:21

## 2024-11-30 RX ADMIN — IBUPROFEN 800 MG: 800 TABLET, FILM COATED ORAL at 00:21

## 2024-11-30 RX ADMIN — IBUPROFEN 800 MG: 800 TABLET, FILM COATED ORAL at 06:34

## 2024-11-30 RX ADMIN — SENNOSIDES AND DOCUSATE SODIUM 2 TABLET: 50; 8.6 TABLET ORAL at 08:06

## 2024-11-30 RX ADMIN — OXYCODONE HYDROCHLORIDE 5 MG: 5 TABLET ORAL at 12:44

## 2024-11-30 RX ADMIN — OXYCODONE HYDROCHLORIDE 5 MG: 5 TABLET ORAL at 08:19

## 2024-11-30 RX ADMIN — OXYCODONE HYDROCHLORIDE 5 MG: 5 TABLET ORAL at 03:38

## 2024-11-30 RX ADMIN — IBUPROFEN 800 MG: 800 TABLET, FILM COATED ORAL at 12:44

## 2024-11-30 RX ADMIN — IBUPROFEN 800 MG: 800 TABLET, FILM COATED ORAL at 20:28

## 2024-11-30 RX ADMIN — PRENATAL VITAMINS-IRON FUMARATE 27 MG IRON-FOLIC ACID 0.8 MG TABLET 1 TABLET: at 08:06

## 2024-11-30 RX ADMIN — SENNOSIDES AND DOCUSATE SODIUM 1 TABLET: 8.6; 5 TABLET ORAL at 20:28

## 2024-11-30 RX ADMIN — ACETAMINOPHEN 975 MG: 325 TABLET, FILM COATED ORAL at 18:14

## 2024-11-30 ASSESSMENT — ACTIVITIES OF DAILY LIVING (ADL)
ADLS_ACUITY_SCORE: 29

## 2024-11-30 NOTE — PLAN OF CARE
"Goal Outcome Evaluation:      Plan of Care Reviewed With: patient    Overall Patient Progress: improving    Vital signs stable. Postpartum assessment WDL. Uterine fundus is firm and midline. Scant to light vaginal bleeding. Using Tylenol, Toradol, and Oxycodone for pain management. Ice pack applied to incision, abd binder on. Pt reporting irritated throat, cough drops ordered, available. Simethicone given x1 for gas pain. Incision dressing in place, moderate drainage under dressing. Pt out of bed twice during shift, stand by assist. Encouraged to move around in room. Merchant removed this evening. tolerating a regular diet. Breastfeeding and formula feeding. Questions/concerns addressed. Family at bedside.      Problem: Adult Inpatient Plan of Care  Goal: Plan of Care Review  Description: The Plan of Care Review/Shift note should be completed every shift.  The Outcome Evaluation is a brief statement about your assessment that the patient is improving, declining, or no change.  This information will be displayed automatically on your shift  note.  11/29/2024 1815 by Dian Hernandez, RN  Outcome: Progressing  Flowsheets (Taken 11/29/2024 1815)  Plan of Care Reviewed With: patient  Overall Patient Progress: improving  11/29/2024 1611 by Dian Hernandez, RN  Outcome: Progressing  Goal: Patient-Specific Goal (Individualized)  Description: You can add care plan individualizations to a care plan. Examples of Individualization might be:  \"Parent requests to be called daily at 9am for status\", \"I have a hard time hearing out of my right ear\", or \"Do not touch me to wake me up as it startles  me\".  11/29/2024 1815 by Dian Hernandez, RN  Outcome: Progressing  11/29/2024 1611 by Dian Hernandez, RN  Outcome: Progressing  Goal: Absence of Hospital-Acquired Illness or Injury  11/29/2024 1815 by Dian Hernandez, RN  Outcome: Progressing  11/29/2024 1611 by Dian Hernandez, RN  Outcome: Progressing  Intervention: Prevent Skin " Injury  Recent Flowsheet Documentation  Taken 2024 1539 by Dian Hernandez RN  Body Position: position changed independently  Taken 2024 0740 by Dian Hernandez RN  Body Position: position changed independently  Goal: Optimal Comfort and Wellbeing  2024 181 by Dian Hernandez RN  Outcome: Progressing  2024 1611 by Dian Hernandez RN  Outcome: Progressing  Intervention: Monitor Pain and Promote Comfort  Recent Flowsheet Documentation  Taken 2024 1539 by Dian Hernandez RN  Pain Management Interventions:   medication (see MAR)   rest  Taken 2024 1246 by Dian Hernandez RN  Pain Management Interventions:   medication (see MAR)   cold applied  Taken 2024 1147 by Dian Hernandez RN  Pain Management Interventions:   medication (see MAR)   cold applied  Taken 2024 0935 by Dian Hernandez RN  Pain Management Interventions: medication (see MAR)  Intervention: Provide Person-Centered Care  Recent Flowsheet Documentation  Taken 2024 1539 by Dian Hernandez RN  Trust Relationship/Rapport:   care explained   choices provided   questions answered   questions encouraged  Taken 2024 0740 by Dian Hernandez RN  Trust Relationship/Rapport:   care explained   choices provided   questions answered   questions encouraged  Goal: Readiness for Transition of Care  2024 181 by Dian Hernandez RN  Outcome: Progressing  2024 1611 by Dian Hernandez RN  Outcome: Progressing     Problem: Postpartum ( Delivery)  Goal: Successful Parent Role Transition  2024 181 by Dian Hernandez RN  Outcome: Progressing  2024 1611 by Dian Hernandez, RN  Outcome: Progressing  Goal: Hemostasis  2024 181 by Dian Hernandez RN  Outcome: Progressing  2024 1611 by Dian Hernandez RN  Outcome: Progressing  Goal: Effective Bowel Elimination  2024 181 by Dian Hernandez RN  Outcome: Progressing  2024 1611 by Mary  Dian BURROWS RN  Outcome: Progressing  Goal: Fluid and Electrolyte Balance  11/29/2024 1815 by Dian Hernandez RN  Outcome: Progressing  11/29/2024 1611 by Dian Hernandez RN  Outcome: Progressing  Goal: Absence of Infection Signs and Symptoms  11/29/2024 1815 by Dian Hernandez RN  Outcome: Progressing  11/29/2024 1611 by Dian Hernandez RN  Outcome: Progressing  Goal: Anesthesia/Sedation Recovery  11/29/2024 1815 by Dian Hernandez RN  Outcome: Progressing  11/29/2024 1611 by Dian Hernandez RN  Outcome: Progressing  Goal: Optimal Pain Control and Function  11/29/2024 1815 by Dian Hernandez RN  Outcome: Progressing  11/29/2024 1611 by Dian Hernandez RN  Outcome: Progressing  Intervention: Prevent or Manage Pain  Recent Flowsheet Documentation  Taken 11/29/2024 1700 by Dian Hernandez RN  Perineal Care: absorbent brief/pad changed  Taken 11/29/2024 1539 by Dian Hernandez RN  Pain Management Interventions:   medication (see MAR)   rest  Taken 11/29/2024 1345 by Dian Hernandez RN  Perineal Care:   absorbent brief/pad changed   cold pack/ice pack applied   perineum cleansed  Taken 11/29/2024 1246 by Dian Hernandez RN  Pain Management Interventions:   medication (see MAR)   cold applied  Taken 11/29/2024 1147 by Dian Hernandez RN  Pain Management Interventions:   medication (see MAR)   cold applied  Taken 11/29/2024 0935 by Dian Hernandez RN  Pain Management Interventions: medication (see MAR)  Goal: Nausea and Vomiting Relief  11/29/2024 1815 by Dian Hernandez RN  Outcome: Progressing  11/29/2024 1611 by Dian Hernandez RN  Outcome: Progressing  Goal: Effective Urinary Elimination  11/29/2024 1815 by Dian Hernandez, RN  Outcome: Progressing  11/29/2024 1611 by Dian Hernandez, RN  Outcome: Progressing  Goal: Effective Oxygenation and Ventilation  11/29/2024 1815 by Dian Hernandez, RN  Outcome: Progressing  11/29/2024 1611 by Dian Hernandez, RN  Outcome:  Progressing  Intervention: Optimize Oxygenation and Ventilation  Recent Flowsheet Documentation  Taken 11/29/2024 1539 by Dian Hernandez, RN  Head of Bed (HOB) Positioning: HOB at 60 degrees  Taken 11/29/2024 0740 by Dian Hernandez RN  Head of Bed (HOB) Positioning: HOB at 60 degrees     Problem: Skin Injury Risk Increased  Goal: Skin Health and Integrity  11/29/2024 1815 by Dian Hernandez, RN  Outcome: Progressing  11/29/2024 1611 by Dian Hernandez RN  Outcome: Progressing  Intervention: Optimize Skin Protection  Recent Flowsheet Documentation  Taken 11/29/2024 1700 by Dian Hernandez, RN  Activity Management:   activity adjusted per tolerance   activity encouraged  Taken 11/29/2024 1539 by Dian Hernandez, RN  Activity Management:   activity adjusted per tolerance   activity encouraged  Head of Bed (HOB) Positioning: HOB at 60 degrees  Taken 11/29/2024 1345 by Dian Hernandez, RN  Activity Management: standing at bedside  Taken 11/29/2024 0740 by Dian Hernandez, RN  Activity Management: bedrest  Head of Bed (HOB) Positioning: HOB at 60 degrees

## 2024-11-30 NOTE — LACTATION NOTE
Lactation in to follow up with  on IPAD. Patient does breast and bottle feeding. Baby sucks and has some swallows once latched. Does do tongue sucking before latched. Mother quick to pull baby off of breast and not finish a full feed. Following with formula. This is baby number 7. States her last two children she nursed for two years.  Wants Medela pump for home.

## 2024-11-30 NOTE — PLAN OF CARE
"Goal Outcome Evaluation:      Plan of Care Reviewed With: patient    Overall Patient Progress: improving    Vital signs stable. Uterine fundus is firm and midline. Scant vaginal bleeding. Using Tylenol, Ibuprofen, and Oxycodone for pain management. Patient reporting 10/10 abd pain, ice pack and abd binder provided. Appears to be resting comfortably in bed. Patient has been resistant to getting out of bed, education provided on importance to void frequently and to ambulate to help with pain and to avoid complications. Pt ambulated to bathroom once during shift with encouragement, stand by assist. Incision has moderate drainage under dressing, no change in appearance from yesterday. Breast/formula feeding. Requiring encouragement to participate in infant cares. Questions/concerns addressed.         Problem: Adult Inpatient Plan of Care  Goal: Plan of Care Review  Description: The Plan of Care Review/Shift note should be completed every shift.  The Outcome Evaluation is a brief statement about your assessment that the patient is improving, declining, or no change.  This information will be displayed automatically on your shift  note.  Outcome: Progressing  Flowsheets (Taken 11/30/2024 1212)  Plan of Care Reviewed With: patient  Overall Patient Progress: improving  Goal: Patient-Specific Goal (Individualized)  Description: You can add care plan individualizations to a care plan. Examples of Individualization might be:  \"Parent requests to be called daily at 9am for status\", \"I have a hard time hearing out of my right ear\", or \"Do not touch me to wake me up as it startles  me\".  Outcome: Progressing  Goal: Absence of Hospital-Acquired Illness or Injury  Outcome: Progressing  Intervention: Prevent Skin Injury  Recent Flowsheet Documentation  Taken 11/30/2024 0816 by Dian Hernandez RN  Body Position: position changed independently  Goal: Optimal Comfort and Wellbeing  Outcome: Progressing  Intervention: Monitor Pain and " Promote Comfort  Recent Flowsheet Documentation  Taken 2024 1111 by Dian Hernandez RN  Pain Management Interventions: medication (see MAR)  Taken 2024 0819 by Dian Hernandez RN  Pain Management Interventions: medication (see MAR)  Intervention: Provide Person-Centered Care  Recent Flowsheet Documentation  Taken 2024 0816 by Dian Hernandez RN  Trust Relationship/Rapport:   care explained   choices provided   questions answered   questions encouraged  Goal: Readiness for Transition of Care  Outcome: Progressing     Problem: Postpartum ( Delivery)  Goal: Successful Parent Role Transition  Outcome: Progressing  Goal: Hemostasis  Outcome: Progressing  Goal: Effective Bowel Elimination  Outcome: Progressing  Goal: Fluid and Electrolyte Balance  Outcome: Progressing  Goal: Absence of Infection Signs and Symptoms  Outcome: Progressing  Goal: Anesthesia/Sedation Recovery  Outcome: Progressing  Goal: Optimal Pain Control and Function  Outcome: Progressing  Intervention: Prevent or Manage Pain  Recent Flowsheet Documentation  Taken 2024 1111 by Dian Hernandez RN  Pain Management Interventions: medication (see MAR)  Taken 2024 0819 by Dian Hernandez RN  Pain Management Interventions: medication (see MAR)  Goal: Nausea and Vomiting Relief  Outcome: Progressing  Goal: Effective Urinary Elimination  Outcome: Progressing  Goal: Effective Oxygenation and Ventilation  Outcome: Progressing  Intervention: Optimize Oxygenation and Ventilation  Recent Flowsheet Documentation  Taken 2024 0816 by Dian Hernandez RN  Head of Bed (HOB) Positioning: HOB at 45 degrees     Problem: Skin Injury Risk Increased  Goal: Skin Health and Integrity  Outcome: Progressing  Intervention: Optimize Skin Protection  Recent Flowsheet Documentation  Taken 2024 0816 by Dian Hernandez RN  Activity Management: activity encouraged  Head of Bed (HOB) Positioning: HOB at 45 degrees

## 2024-11-30 NOTE — PLAN OF CARE
Vitals stable this shift. Incision site with noticeable drainage. Patient ambulating to bathroom with much encouragement needed. Stand by assist to bathroom. Patient able to void x3. C/o pain and somewhat controlled with scheduled ibuprofen and tylenol and PRN oxycodone with slight relief. Patient educated encouraged the need to get out of bed and ambulate in order for better pain control. Using abdominal binder. Assistance needed to hand baby over and to do infant cares.     Problem: Adult Inpatient Plan of Care  Goal: Plan of Care Review  Description: The Plan of Care Review/Shift note should be completed every shift.  The Outcome Evaluation is a brief statement about your assessment that the patient is improving, declining, or no change.  This information will be displayed automatically on your shift  note.  Outcome: Progressing  Goal: Absence of Hospital-Acquired Illness or Injury  Intervention: Prevent Skin Injury  Recent Flowsheet Documentation  Taken 11/30/2024 0020 by Moriah Gunter RN  Body Position: position changed independently  Goal: Optimal Comfort and Wellbeing  Intervention: Provide Person-Centered Care  Recent Flowsheet Documentation  Taken 11/30/2024 0020 by Moriah Gunter RN  Trust Relationship/Rapport:   care explained   choices provided   questions answered   questions encouraged   Goal Outcome Evaluation:

## 2024-12-01 PROCEDURE — 250N000013 HC RX MED GY IP 250 OP 250 PS 637: Performed by: OBSTETRICS & GYNECOLOGY

## 2024-12-01 PROCEDURE — 120N000001 HC R&B MED SURG/OB

## 2024-12-01 RX ADMIN — OXYCODONE HYDROCHLORIDE 5 MG: 5 TABLET ORAL at 19:42

## 2024-12-01 RX ADMIN — ACETAMINOPHEN 975 MG: 325 TABLET, FILM COATED ORAL at 00:15

## 2024-12-01 RX ADMIN — ACETAMINOPHEN 975 MG: 325 TABLET, FILM COATED ORAL at 07:32

## 2024-12-01 RX ADMIN — OXYCODONE HYDROCHLORIDE 5 MG: 5 TABLET ORAL at 07:32

## 2024-12-01 RX ADMIN — SENNOSIDES AND DOCUSATE SODIUM 2 TABLET: 50; 8.6 TABLET ORAL at 21:38

## 2024-12-01 RX ADMIN — IBUPROFEN 800 MG: 800 TABLET, FILM COATED ORAL at 02:34

## 2024-12-01 RX ADMIN — ACETAMINOPHEN 975 MG: 325 TABLET, FILM COATED ORAL at 13:58

## 2024-12-01 RX ADMIN — IBUPROFEN 800 MG: 800 TABLET, FILM COATED ORAL at 21:38

## 2024-12-01 RX ADMIN — IBUPROFEN 800 MG: 800 TABLET, FILM COATED ORAL at 15:46

## 2024-12-01 RX ADMIN — ACETAMINOPHEN 975 MG: 325 TABLET, FILM COATED ORAL at 20:01

## 2024-12-01 RX ADMIN — PRENATAL VITAMINS-IRON FUMARATE 27 MG IRON-FOLIC ACID 0.8 MG TABLET 1 TABLET: at 07:32

## 2024-12-01 RX ADMIN — IBUPROFEN 800 MG: 800 TABLET, FILM COATED ORAL at 09:48

## 2024-12-01 RX ADMIN — OXYCODONE HYDROCHLORIDE 5 MG: 5 TABLET ORAL at 00:15

## 2024-12-01 RX ADMIN — SENNOSIDES AND DOCUSATE SODIUM 2 TABLET: 50; 8.6 TABLET ORAL at 07:32

## 2024-12-01 RX ADMIN — OXYCODONE HYDROCHLORIDE 5 MG: 5 TABLET ORAL at 12:28

## 2024-12-01 ASSESSMENT — ACTIVITIES OF DAILY LIVING (ADL)
ADLS_ACUITY_SCORE: 28

## 2024-12-01 NOTE — PLAN OF CARE
"Goal Outcome Evaluation:      Plan of Care Reviewed With: patient, family    Overall Patient Progress: improvingOverall Patient Progress: improving    Outcome Evaluation: VSS. Voiding and stooling WDL. Showered. Walked in [place] once. Breastfeeding, pumping large amounts of colostrum and formula feeding. Tolerating pain with ibuprofen, tylenol, oxycodone and ice.  Pt is bonding well with baby boy. Tolerates eating and drinking.  Fundus is firm, midline and 1 cm below umbilicus. Lochia is scant, rubra and pt denies having any clots. Ambulating independently.     Problem: Adult Inpatient Plan of Care  Goal: Plan of Care Review  Description: The Plan of Care Review/Shift note should be completed every shift.  The Outcome Evaluation is a brief statement about your assessment that the patient is improving, declining, or no change.  This information will be displayed automatically on your shift  note.  12/1/2024 1747 by Olinda Leyva RN  Outcome: Progressing  Flowsheets (Taken 12/1/2024 1747)  Outcome Evaluation: VSS. Voiding and stooling WDL. Showered. Walked in [place] once. Breastfeeding, pumping large amounts of colostrum and formula feeding. Tolerating pain with ibuprofen, tylenol, oxycodone and ice.  Plan of Care Reviewed With:   patient   family  Overall Patient Progress: improving  12/1/2024 1735 by Olinda Leyva RN  Outcome: Progressing  Flowsheets (Taken 12/1/2024 1735)  Outcome Evaluation: VSS. Voiding and passing gas. No IV. Showered today. Breastfeeding independently. Tolerating pain with tylenol, tucks and ibuprofen.  Plan of Care Reviewed With:   patient   family  Overall Patient Progress: improving  12/1/2024 1733 by Oilnda Leyva RN  Outcome: Progressing  Goal: Patient-Specific Goal (Individualized)  Description: You can add care plan individualizations to a care plan. Examples of Individualization might be:  \"Parent requests to be called daily at 9am for status\", \"I have a hard time hearing " "out of my right ear\", or \"Do not touch me to wake me up as it startles  me\".  12/1/2024 1747 by Olinda Leyva RN  Outcome: Progressing  12/1/2024 1735 by Olinda Leyva RN  Outcome: Progressing  12/1/2024 1733 by Olinda Leyva RN  Outcome: Progressing  Goal: Absence of Hospital-Acquired Illness or Injury  12/1/2024 1747 by Olinda Leyva RN  Outcome: Progressing  12/1/2024 1735 by Olinda Leyva RN  Outcome: Progressing  12/1/2024 1733 by Olinda Leyva RN  Outcome: Progressing  Intervention: Prevent Skin Injury  Recent Flowsheet Documentation  Taken 12/1/2024 1548 by Olinda Leyva RN  Body Position: position changed independently  Taken 12/1/2024 0948 by Olinda Leyva RN  Body Position: position changed independently  Intervention: Prevent and Manage VTE (Venous Thromboembolism) Risk  Recent Flowsheet Documentation  Taken 12/1/2024 1550 by Olinda Leyva RN  VTE Prevention/Management: (ambulation and fluids encouraged) other (see comments)  Taken 12/1/2024 1000 by Olinda Leyva RN  VTE Prevention/Management: (ambulation and fluids encouraged) other (see comments)  Intervention: Prevent Infection  Recent Flowsheet Documentation  Taken 12/1/2024 1550 by Olinda Leyva RN  Infection Prevention:   rest/sleep promoted   hand hygiene promoted  Taken 12/1/2024 1000 by Olinda Leyva RN  Infection Prevention:   rest/sleep promoted   hand hygiene promoted  Goal: Optimal Comfort and Wellbeing  12/1/2024 1747 by Olinda Leyva RN  Outcome: Progressing  12/1/2024 1735 by lOinda Leyva RN  Outcome: Progressing  12/1/2024 1733 by Olinda Leyva RN  Outcome: Progressing  Intervention: Monitor Pain and Promote Comfort  Recent Flowsheet Documentation  Taken 12/1/2024 1548 by Olinda Leyva RN  Pain Management Interventions: medication (see MAR)  Taken 12/1/2024 0948 by Olinda Leyva RN  Pain Management Interventions: medication (see " MAR)  Taken 2024 0732 by Olinda Leyva RN  Pain Management Interventions: medication (see MAR)  Intervention: Provide Person-Centered Care  Recent Flowsheet Documentation  Taken 2024 1550 by Olinda Leyva RN  Trust Relationship/Rapport:   care explained   choices provided   emotional support provided   questions answered   questions encouraged   thoughts/feelings acknowledged  Taken 2024 1000 by Olinda Leyva RN  Trust Relationship/Rapport:   care explained   choices provided   emotional support provided   questions answered   questions encouraged   thoughts/feelings acknowledged  Goal: Readiness for Transition of Care  2024 1747 by Olinda Leyva RN  Outcome: Progressing  2024 1735 by Olinda Leyva RN  Outcome: Progressing  2024 1733 by Olinda Leyva RN  Outcome: Progressing     Problem: Postpartum ( Delivery)  Goal: Successful Parent Role Transition  2024 1747 by Olinda Leyva RN  Outcome: Progressing  2024 1735 by Olinda Leyva RN  Outcome: Progressing  2024 1733 by Olinda Leyva RN  Outcome: Progressing  Intervention: Support Parent Role Transition  Recent Flowsheet Documentation  Taken 2024 1550 by Olinda Leyva RN  Supportive Measures:   active listening utilized   decision-making supported   positive reinforcement provided   self-care encouraged  Parent-Child Attachment Promotion:   caring behavior modeled   cue recognition promoted   interaction encouraged   participation in care promoted   positive reinforcement provided   rooming-in promoted   skin-to-skin contact encouraged  Taken 2024 1000 by Olinda Leyva RN  Supportive Measures:   active listening utilized   decision-making supported   positive reinforcement provided   self-care encouraged  Parent-Child Attachment Promotion:   caring behavior modeled   cue recognition promoted   interaction encouraged   participation in care  promoted   positive reinforcement provided   rooming-in promoted   skin-to-skin contact encouraged  Goal: Hemostasis  12/1/2024 1747 by Olinda Leyva RN  Outcome: Progressing  12/1/2024 1735 by Olinda Leyva RN  Outcome: Progressing  12/1/2024 1733 by Olinda Leyva RN  Outcome: Progressing  Intervention: Manage Bleeding  Recent Flowsheet Documentation  Taken 12/1/2024 1550 by Olinda Leyva RN  Syncope Management: position changed slowly  Taken 12/1/2024 1000 by Olinda Leyva RN  Syncope Management: position changed slowly  Goal: Effective Bowel Elimination  12/1/2024 1747 by Olinda Leyva RN  Outcome: Progressing  12/1/2024 1735 by Olinda Leyva RN  Outcome: Progressing  12/1/2024 1733 by Olinda Leyva RN  Outcome: Progressing  Intervention: Enhance Bowel Motility and Elimination  Recent Flowsheet Documentation  Taken 12/1/2024 1550 by Olinda Leyva RN  Bowel Motility Enhancement:   ambulation promoted   fluid intake encouraged  Bowel Elimination Promotion:   adequate fluid intake promoted   ambulation promoted  Taken 12/1/2024 1000 by Olinda Leyva RN  Bowel Motility Enhancement:   ambulation promoted   fluid intake encouraged  Bowel Elimination Promotion:   adequate fluid intake promoted   ambulation promoted  Goal: Fluid and Electrolyte Balance  12/1/2024 1747 by Olinda Leyva RN  Outcome: Progressing  12/1/2024 1735 by Olinda Leyva RN  Outcome: Progressing  12/1/2024 1733 by Olinda Leyva RN  Outcome: Progressing  Intervention: Monitor and Manage Fluid and Electrolyte Balance  Recent Flowsheet Documentation  Taken 12/1/2024 1550 by Olinda Leyva RN  Fluid/Electrolyte Management: fluids provided  Taken 12/1/2024 1000 by Olinda Leyva RN  Fluid/Electrolyte Management: fluids provided  Goal: Absence of Infection Signs and Symptoms  12/1/2024 1747 by Olinda Leyva RN  Outcome: Progressing  12/1/2024 1735 by Gordon  JAIDEN Prakash  Outcome: Progressing  12/1/2024 1733 by Olinda Leyva RN  Outcome: Progressing  Intervention: Prevent or Manage Infection  Recent Flowsheet Documentation  Taken 12/1/2024 1550 by Olinda Leyva RN  Infection Management: aseptic technique maintained  Taken 12/1/2024 1000 by Olinda Leyva RN  Infection Management: aseptic technique maintained  Goal: Anesthesia/Sedation Recovery  12/1/2024 1747 by Olinda Leyva RN  Outcome: Progressing  12/1/2024 1735 by Olinda Leyva RN  Outcome: Progressing  12/1/2024 1733 by Olinda Leyva RN  Outcome: Progressing  Goal: Optimal Pain Control and Function  12/1/2024 1747 by Olinda Leyva RN  Outcome: Progressing  12/1/2024 1735 by Olinda Leyva RN  Outcome: Progressing  12/1/2024 1733 by Olinda Leyva RN  Outcome: Progressing  Intervention: Prevent or Manage Pain  Recent Flowsheet Documentation  Taken 12/1/2024 1548 by Olinda Leyva RN  Pain Management Interventions: medication (see MAR)  Taken 12/1/2024 0948 by Olinda Leyva RN  Pain Management Interventions: medication (see MAR)  Taken 12/1/2024 0732 by Olinda Leyva RN  Pain Management Interventions: medication (see MAR)  Goal: Nausea and Vomiting Relief  12/1/2024 1747 by Olinda Leyva RN  Outcome: Progressing  12/1/2024 1735 by Olinda Leyva RN  Outcome: Progressing  12/1/2024 1733 by Olinda Leyva RN  Outcome: Progressing  Goal: Effective Urinary Elimination  12/1/2024 1747 by Olinda Leyva RN  Outcome: Progressing  12/1/2024 1735 by Olinda Leyva RN  Outcome: Progressing  12/1/2024 1733 by Olinda Leyva RN  Outcome: Progressing  Intervention: Monitor and Manage Urinary Retention  Recent Flowsheet Documentation  Taken 12/1/2024 1550 by Olinda Leyva RN  Urinary Elimination Promotion: frequent voiding encouraged  Taken 12/1/2024 1000 by Olinda Leyva, RN  Urinary Elimination Promotion: frequent  voiding encouraged  Goal: Effective Oxygenation and Ventilation  12/1/2024 1747 by Olinda Leyva RN  Outcome: Progressing  12/1/2024 1735 by Olinda Leyva RN  Outcome: Progressing  12/1/2024 1733 by Olinda Leyva RN  Outcome: Progressing  Intervention: Optimize Oxygenation and Ventilation  Recent Flowsheet Documentation  Taken 12/1/2024 1548 by Olinda Leyva RN  Head of Bed (HOB) Positioning: HOB at 30 degrees  Taken 12/1/2024 0948 by Olinda Leyva RN  Head of Bed (HOB) Positioning: HOB at 30 degrees     Problem: Skin Injury Risk Increased  Goal: Skin Health and Integrity  12/1/2024 1747 by Olinda Leyva RN  Outcome: Progressing  12/1/2024 1735 by Olinda Leyva RN  Outcome: Progressing  12/1/2024 1733 by Olinda Leyva RN  Outcome: Progressing  Intervention: Plan: Nurse Driven Intervention: Moisture Management  Recent Flowsheet Documentation  Taken 12/1/2024 1550 by Olinda Leyva RN  Moisture Interventions: Encourage regular toileting  Taken 12/1/2024 1000 by Olinda Leyva RN  Moisture Interventions: Encourage regular toileting  Intervention: Optimize Skin Protection  Recent Flowsheet Documentation  Taken 12/1/2024 1548 by Olinda Leyva RN  Activity Management: activity encouraged  Head of Bed (HOB) Positioning: HOB at 30 degrees  Taken 12/1/2024 0948 by Olinda Leyva RN  Activity Management: activity encouraged  Head of Bed (HOB) Positioning: HOB at 30 degrees

## 2024-12-01 NOTE — PROGRESS NOTES
December 1, 2024      SUBJECTIVE: No acute overnight events.  Pain adequately controlled.  +Lochia, light.  Tolerating PO.  + Flatus.  Ambulating/urinating w/o difficulty.  Denies CP/palp/SOB/LH.    OBJECTIVE: BP (!) 89/63   Pulse 96   Temp 98.2  F (36.8  C) (Oral)   Resp 16   LMP 03/08/2024   SpO2 99%   Breastfeeding Unknown   Gen: NAD, A&O x3  Abd: soft.  Incision C/D/I, no active bleeding. Dressing has been removed. No erythema, induration, or discharge  Ext: No edema BL LEs, symmetric, no CT    Hemoglobin   Date Value Ref Range Status   11/29/2024 12.3 11.7 - 15.7 g/dL Final   11/29/2024 14.8 11.7 - 15.7 g/dL Final   07/15/2020 13.8 11.7 - 15.7 g/dL Final   12/05/2019 14.5 11.7 - 15.7 g/dL Final   ]    A/P: POD#1 s/p emergent repeat LTCS for heavy vaginal bleeding, suspected placenta abruption.  Doing well.  Afebrile, VSS.  - ibuprofen/oxycodone/tylenol PRN pain  - regular diet as tolerated  - breastfeeding  - encouraged ambulation  - routine post-op care  - pt desire home on POD #3 (Tuesday)        KARI ROSADO MD

## 2024-12-01 NOTE — PROGRESS NOTES
December 1, 2024      SUBJECTIVE: No acute overnight events.  Pain adequately controlled.  +Lochia, light-mod.  Tolerating PO.  + Flatus.  Ambulating/urinating w/o difficulty.  Denies CP/palp/SOB/LH.      OBJECTIVE: BP (!) 89/63   Pulse 96   Temp 98.2  F (36.8  C) (Oral)   Resp 16   LMP 03/08/2024   SpO2 99%   Breastfeeding Unknown   Gen: NAD, A&O x3; Pt's other 5 children in the room  Abd: soft.  Incision C/D/I, no active bleeding.  Island dressing in place palpated dry  Ext: no edema BL LEs, symmetric, no CT    Hemoglobin   Date Value Ref Range Status   11/29/2024 12.3 11.7 - 15.7 g/dL Final   11/29/2024 14.8 11.7 - 15.7 g/dL Final   07/15/2020 13.8 11.7 - 15.7 g/dL Final   12/05/2019 14.5 11.7 - 15.7 g/dL Final   ]    A/P: POD#0 s/p emergent repeat LTCS for vaginal bleeding, suspected abruption, just after midnight today.  Doing well.  Afebrile, VSS.  - toradol/oxycodone/tylenol PRN pain  - regular diet as tolerated  - breastfeeding  - encouraged ambulation  - routine post-op care        KARI ROSADO MD

## 2024-12-01 NOTE — PLAN OF CARE
"Vital signs stable. Fundus firm, midline, 1cm below the umbilicus -- pt pushing hand away as attempting fundal check. Unable to get deep check but could lightly feel top of uterus and no bleeding noted. Lochia rubra and scant. C/S incision dressing removed - open to air and CDI. Voiding without difficulty. Ambulating independently, encouraging small walks to help with overall recovery. Pain managed with Tylenol, Ibuprofen, and Oxycodone. Utilizing abdominal binder when up and out of bed. Breast and formula feeding and tolerating well with no assistance. Pt reported that she had not gotten infant to breast since noon, discussed goals of feeding and that if she plans to breastfeed, she needs to stimulate her breasts. Got infant to breast and supplemented with formula overnight. Family at bedside and attentive to and supportive of patient. Bonding well with infant. Independent with self and infant cares. Encouraging pt to call with any questions or concerns. Will monitor as needed and continue with plan of care.     Goal Outcome Evaluation:      Plan of Care Reviewed With: patient, family    Overall Patient Progress: improvingOverall Patient Progress: improving       Problem: Adult Inpatient Plan of Care  Goal: Plan of Care Review  Description: The Plan of Care Review/Shift note should be completed every shift.  The Outcome Evaluation is a brief statement about your assessment that the patient is improving, declining, or no change.  This information will be displayed automatically on your shift  note.  Outcome: Progressing  Flowsheets (Taken 12/1/2024 0556)  Plan of Care Reviewed With:   patient   family  Overall Patient Progress: improving  Goal: Patient-Specific Goal (Individualized)  Description: You can add care plan individualizations to a care plan. Examples of Individualization might be:  \"Parent requests to be called daily at 9am for status\", \"I have a hard time hearing out of my right ear\", or \"Do not touch me " "to wake me up as it startles  me\".  Outcome: Progressing  Goal: Absence of Hospital-Acquired Illness or Injury  Outcome: Progressing  Intervention: Prevent Skin Injury  Recent Flowsheet Documentation  Taken 2024 0000 by Carolina Kimball RN  Body Position: position changed independently  Intervention: Prevent Infection  Recent Flowsheet Documentation  Taken 2024 0000 by Carolina Kimball RN  Infection Prevention:   rest/sleep promoted   hand hygiene promoted  Goal: Optimal Comfort and Wellbeing  Outcome: Progressing  Intervention: Provide Person-Centered Care  Recent Flowsheet Documentation  Taken 2024 0000 by Carolina Kimball RN  Trust Relationship/Rapport:   care explained   choices provided   emotional support provided   questions answered   questions encouraged   thoughts/feelings acknowledged  Goal: Readiness for Transition of Care  Outcome: Progressing     Problem: Postpartum ( Delivery)  Goal: Successful Parent Role Transition  Outcome: Progressing  Intervention: Support Parent Role Transition  Recent Flowsheet Documentation  Taken 2024 0000 by Carolina Kimball RN  Supportive Measures:   active listening utilized   decision-making supported   positive reinforcement provided   self-care encouraged  Parent-Child Attachment Promotion:   caring behavior modeled   cue recognition promoted   interaction encouraged   participation in care promoted   positive reinforcement provided   rooming-in promoted   skin-to-skin contact encouraged  Goal: Hemostasis  Outcome: Progressing  Intervention: Manage Bleeding  Recent Flowsheet Documentation  Taken 2024 0000 by Carolina Kimball RN  Syncope Management: position changed slowly  Goal: Effective Bowel Elimination  Outcome: Progressing  Intervention: Enhance Bowel Motility and Elimination  Recent Flowsheet Documentation  Taken 2024 0000 by Carolina Kimball RN  Bowel Motility Enhancement:   ambulation promoted   fluid " intake encouraged  Bowel Elimination Promotion:   ambulation promoted   adequate fluid intake promoted  Goal: Fluid and Electrolyte Balance  Outcome: Progressing  Goal: Absence of Infection Signs and Symptoms  Outcome: Progressing  Goal: Anesthesia/Sedation Recovery  Outcome: Progressing  Goal: Optimal Pain Control and Function  Outcome: Progressing  Goal: Nausea and Vomiting Relief  Outcome: Progressing  Goal: Effective Urinary Elimination  Outcome: Progressing  Intervention: Monitor and Manage Urinary Retention  Recent Flowsheet Documentation  Taken 12/1/2024 0000 by Carolina Kimball RN  Urinary Elimination Promotion: frequent voiding encouraged  Goal: Effective Oxygenation and Ventilation  Outcome: Progressing  Intervention: Optimize Oxygenation and Ventilation  Recent Flowsheet Documentation  Taken 12/1/2024 0000 by Carolina Kimball RN  Head of Bed (HOB) Positioning: HOB at 30 degrees     Problem: Skin Injury Risk Increased  Goal: Skin Health and Integrity  Outcome: Progressing  Intervention: Optimize Skin Protection  Recent Flowsheet Documentation  Taken 12/1/2024 0000 by Carolina Kimball RN  Activity Management: activity encouraged  Head of Bed (HOB) Positioning: HOB at 30 degrees

## 2024-12-02 VITALS
SYSTOLIC BLOOD PRESSURE: 106 MMHG | HEART RATE: 96 BPM | RESPIRATION RATE: 17 BRPM | OXYGEN SATURATION: 99 % | DIASTOLIC BLOOD PRESSURE: 72 MMHG | TEMPERATURE: 98.3 F

## 2024-12-02 LAB
PATH REPORT.COMMENTS IMP SPEC: NORMAL
PATH REPORT.COMMENTS IMP SPEC: NORMAL
PATH REPORT.FINAL DX SPEC: NORMAL
PATH REPORT.GROSS SPEC: NORMAL
PATH REPORT.MICROSCOPIC SPEC OTHER STN: NORMAL
PATH REPORT.RELEVANT HX SPEC: NORMAL
PHOTO IMAGE: NORMAL

## 2024-12-02 PROCEDURE — 250N000013 HC RX MED GY IP 250 OP 250 PS 637: Performed by: OBSTETRICS & GYNECOLOGY

## 2024-12-02 RX ORDER — IBUPROFEN 800 MG/1
800 TABLET, FILM COATED ORAL EVERY 8 HOURS PRN
Qty: 90 TABLET | Refills: 0 | Status: SHIPPED | OUTPATIENT
Start: 2024-12-02

## 2024-12-02 RX ORDER — OXYCODONE HYDROCHLORIDE 5 MG/1
5 TABLET ORAL EVERY 6 HOURS PRN
Qty: 10 TABLET | Refills: 0 | Status: SHIPPED | OUTPATIENT
Start: 2024-12-02

## 2024-12-02 RX ORDER — ACETAMINOPHEN 325 MG/1
650 TABLET ORAL EVERY 6 HOURS PRN
Qty: 120 TABLET | Refills: 0 | Status: SHIPPED | OUTPATIENT
Start: 2024-12-02

## 2024-12-02 RX ORDER — AMOXICILLIN 250 MG
2 CAPSULE ORAL 2 TIMES DAILY PRN
Qty: 60 TABLET | Refills: 0 | Status: SHIPPED | OUTPATIENT
Start: 2024-12-02

## 2024-12-02 RX ADMIN — ACETAMINOPHEN 975 MG: 325 TABLET, FILM COATED ORAL at 02:00

## 2024-12-02 RX ADMIN — OXYCODONE HYDROCHLORIDE 5 MG: 5 TABLET ORAL at 09:07

## 2024-12-02 RX ADMIN — IBUPROFEN 800 MG: 800 TABLET, FILM COATED ORAL at 10:38

## 2024-12-02 RX ADMIN — PRENATAL VITAMINS-IRON FUMARATE 27 MG IRON-FOLIC ACID 0.8 MG TABLET 1 TABLET: at 09:08

## 2024-12-02 RX ADMIN — IBUPROFEN 800 MG: 800 TABLET, FILM COATED ORAL at 03:31

## 2024-12-02 RX ADMIN — SENNOSIDES AND DOCUSATE SODIUM 2 TABLET: 50; 8.6 TABLET ORAL at 09:08

## 2024-12-02 RX ADMIN — OXYCODONE HYDROCHLORIDE 5 MG: 5 TABLET ORAL at 03:31

## 2024-12-02 RX ADMIN — ACETAMINOPHEN 975 MG: 325 TABLET, FILM COATED ORAL at 09:07

## 2024-12-02 ASSESSMENT — ACTIVITIES OF DAILY LIVING (ADL)
ADLS_ACUITY_SCORE: 26
ADLS_ACUITY_SCORE: 26
ADLS_ACUITY_SCORE: 28
ADLS_ACUITY_SCORE: 26
ADLS_ACUITY_SCORE: 28
ADLS_ACUITY_SCORE: 26
ADLS_ACUITY_SCORE: 26
ADLS_ACUITY_SCORE: 28

## 2024-12-02 NOTE — PLAN OF CARE
VSS on room air. Fundus/lochia/incision WDL. Incision is BRANDO. Voiding appropriately and ambulating independently. Pain adequately controlled with scheduled and PRN medications. Breastfeeding infant independently q2-3hr, also supplementing with formula. Family at bedside, supportive. Patient interactive with infant and attentive to his cues.    Birth Certificate and PP depression screen received. Education completed and AVS/discharge paperwork reviewed.  Patient indicates understanding discharge instructions. Questions answered, concerns addressed, resources provided. Verbalizes when to return to clinic for follow up for herself and infant.  Prescriptions reviewed and delivered to patient.  Staff escorted patient and infant off unit with AVS/discharge paperwork, breast pump, prescriptions, and personal belongings.     Goal Outcome Evaluation:      Plan of Care Reviewed With: patient    Overall Patient Progress: improving    Problem: Adult Inpatient Plan of Care  Goal: Absence of Hospital-Acquired Illness or Injury  Outcome: Met  Intervention: Prevent Skin Injury  Recent Flowsheet Documentation  Taken 12/2/2024 0820 by Marie Mullins RN  Body Position:   position changed independently   position maintained  Intervention: Prevent and Manage VTE (Venous Thromboembolism) Risk  Recent Flowsheet Documentation  Taken 12/2/2024 0820 by Marie Mullins RN  VTE Prevention/Management: (ambulating) other (see comments)  Intervention: Prevent Infection  Recent Flowsheet Documentation  Taken 12/2/2024 0820 by Marie Mullins RN  Infection Prevention:   rest/sleep promoted   hand hygiene promoted     Problem: Adult Inpatient Plan of Care  Goal: Optimal Comfort and Wellbeing  Outcome: Met  Intervention: Provide Person-Centered Care  Recent Flowsheet Documentation  Taken 12/2/2024 0820 by Marie Mullins RN  Trust Relationship/Rapport:   care explained   choices provided   emotional support provided   empathic listening provided   questions  answered   questions encouraged   reassurance provided   thoughts/feelings acknowledged     Problem: Postpartum ( Delivery)  Goal: Successful Parent Role Transition  Outcome: Met  Intervention: Support Parent Role Transition  Recent Flowsheet Documentation  Taken 2024 0820 by Marie Mullins RN  Supportive Measures:   active listening utilized   decision-making supported   positive reinforcement provided   self-care encouraged   relaxation techniques promoted  Parent-Child Attachment Promotion:   caring behavior modeled   cue recognition promoted   face-to-face positioning promoted   interaction encouraged   parent/caregiver presence encouraged   participation in care promoted   positive reinforcement provided   rooming-in promoted   skin-to-skin contact encouraged   strengths emphasized

## 2024-12-02 NOTE — PROGRESS NOTES
OB Postpartum Note    S:  Patient without complaints. Nausea controlled, tolerating regular diet. Ambulating, voiding without difficulty. Passing flatus. Minimal lochia. Pain controlled. Feeding baby: Breastfeeding    O:  Vitals were reviewed  Blood pressure 113/66, pulse 96, temperature 98.2  F (36.8  C), temperature source Oral, resp. rate 16, last menstrual period 03/08/2024, SpO2 99%, unknown if currently breastfeeding.        Gen - NAD, resting        CV - RRR        Abdomen - Fundus firm, below umbilicus, nontender        Incision - C/D/I        Extremities - No calf tenderness, no unilateral edema    O POS  Hemoglobin   Date Value Ref Range Status   11/29/2024 12.3 11.7 - 15.7 g/dL Final   11/29/2024 14.8 11.7 - 15.7 g/dL Final   07/15/2020 13.8 11.7 - 15.7 g/dL Final   12/05/2019 14.5 11.7 - 15.7 g/dL Final         A:   Postoperative Day #3 , s/p repeat CS for abruption- doing well    P:  1)  Discharge home        2)  F/U in 2 and 6 weeks with [facility]       Marjan Bruner MD

## 2024-12-02 NOTE — PLAN OF CARE
Goal Outcome Evaluation:      Plan of Care Reviewed With: patient    Overall Patient Progress: improvingOverall Patient Progress: improving         Patient's vital signs stable, voiding spontaneously and drinking adequate fluids. Pain being managed with scheduled pain medications, oxycodone, and heat packs. Doing a mix of breast and formula feeding. Family member at bedside overnight, sleeping on the couch. Mother is independent with baby cares. Did not pump overnight at all.       Problem: Adult Inpatient Plan of Care  Goal: Plan of Care Review  Description: The Plan of Care Review/Shift note should be completed every shift.  The Outcome Evaluation is a brief statement about your assessment that the patient is improving, declining, or no change.  This information will be displayed automatically on your shift  note.  Outcome: Progressing  Flowsheets (Taken 2024 0453)  Plan of Care Reviewed With: patient  Overall Patient Progress: improving  Goal: Absence of Hospital-Acquired Illness or Injury  Outcome: Progressing  Intervention: Prevent Skin Injury  Recent Flowsheet Documentation  Taken 2024 by Vanessa Boswell, RN  Body Position: position changed independently  Goal: Optimal Comfort and Wellbeing  Outcome: Progressing  Intervention: Monitor Pain and Promote Comfort  Recent Flowsheet Documentation  Taken 2024 by Vanessa Boswell, RN  Pain Management Interventions: medication (see MAR)  Intervention: Provide Person-Centered Care  Recent Flowsheet Documentation  Taken 2024 by Vanessa Boswell, RN  Trust Relationship/Rapport:   care explained   choices provided   emotional support provided   empathic listening provided   questions answered   questions encouraged   reassurance provided   thoughts/feelings acknowledged  Goal: Readiness for Transition of Care  Outcome: Progressing     Problem: Postpartum ( Delivery)  Goal: Successful Parent Role Transition  Outcome:  Progressing  Intervention: Support Parent Role Transition  Recent Flowsheet Documentation  Taken 12/1/2024 2341 by Vanessa Boswell, RN  Supportive Measures:   active listening utilized   decision-making supported   positive reinforcement provided   self-care encouraged   relaxation techniques promoted  Parent-Child Attachment Promotion:   caring behavior modeled   cue recognition promoted   face-to-face positioning promoted   interaction encouraged   parent/caregiver presence encouraged   participation in care promoted   positive reinforcement provided   rooming-in promoted   skin-to-skin contact encouraged   strengths emphasized  Goal: Hemostasis  Outcome: Progressing  Goal: Effective Bowel Elimination  Outcome: Progressing  Goal: Fluid and Electrolyte Balance  Outcome: Progressing  Goal: Absence of Infection Signs and Symptoms  Outcome: Progressing  Goal: Anesthesia/Sedation Recovery  Outcome: Progressing  Goal: Optimal Pain Control and Function  Outcome: Progressing  Intervention: Prevent or Manage Pain  Recent Flowsheet Documentation  Taken 12/1/2024 2341 by Vanessa Boswell, RN  Pain Management Interventions: medication (see MAR)  Goal: Nausea and Vomiting Relief  Outcome: Progressing  Goal: Effective Urinary Elimination  Outcome: Progressing  Goal: Effective Oxygenation and Ventilation  Outcome: Progressing  Intervention: Optimize Oxygenation and Ventilation  Recent Flowsheet Documentation  Taken 12/1/2024 2341 by Vanessa Boswell, RN  Head of Bed (HOB) Positioning: HOB at 60-90 degrees     Problem: Skin Injury Risk Increased  Goal: Skin Health and Integrity  Outcome: Progressing  Intervention: Optimize Skin Protection  Recent Flowsheet Documentation  Taken 12/1/2024 2341 by Vanessa Boswell, RN  Activity Management: up ad mini  Head of Bed (HOB) Positioning: HOB at 60-90 degrees

## 2024-12-02 NOTE — DISCHARGE SUMMARY
Patient was admitted for emergent  section for suspected abruption at 38 weeks gestation. Please see admission note and operative note for details. She had an uncomplicated postoperative recovery and was discharged on postoperative day #3. Postoperative hemoglobin: Appropriate for surgical losses. She will follow up in 2 and 6 weeks.     Marjan Bruner MD

## 2024-12-02 NOTE — DISCHARGE INSTRUCTIONS
Warning Signs after Having a Baby    Keep this paper on your fridge or somewhere else where you can see it.    Call your provider if you have any of these symptoms up to 12 weeks after having your baby.    Thoughts of hurting yourself or your baby  Pain in your chest or trouble breathing  Severe headache not helped by pain medicine  Eyesight concerns (blurry vision, seeing spots or flashes of light, other changes to eyesight)  Fainting, shaking or other signs of a seizure    Call 9-1-1 if you feel that it is an emergency.     The symptoms below can happen to anyone after giving birth. They can be very serious. Call your provider if you have any of these warning signs.    Losing too much blood (hemorrhage)    Call your provider if you soak through a pad in less than an hour or pass blood clots bigger than a golf ball. These may be signs that you are bleeding too much.    Blood clots in the legs or lungs    After you give birth, your body naturally clots its blood to help prevent blood loss. Sometimes this increased clotting can happen in other areas of the body, like the legs or lungs. This can block your blood flow and be very dangerous.     Call your provider if you:  Have a red, swollen spot on the back of your leg that is warm or painful when you touch it.   Are coughing up blood.     Infection    Call your provider if you have any of these symptoms:  Fever of 100.4 F (38 C) or higher.  Pain or redness around your stitches if you had an incision.   Any yellow, white, or green fluid coming from places where you had stitches or surgery.    Mood Problems (postpartum depression)    Many people feel sad or have mood changes after having a baby. But for some people, these mood swings are worse.     Call your provider right away if you feel so anxious or nervous that you can't care for yourself or your baby.    Preeclampsia (high blood pressure)    Even if you didn't have high blood pressure when you were pregnant, you  are at risk for the high blood pressure disease called preeclampsia. This risk can last up to 12 weeks after giving birth.     Call your provider if you have:   Pain on your right side under your rib cage  Sudden swelling in the hands and face    Remember: You know your body. If something doesn't feel right, get medical help.     For informational purposes only. Not to replace the advice of your health care provider. Copyright 2020 [facility]. All rights reserved. Clinically reviewed by Linnea Dumont RNC-OB, MSN. [facility] [id number] - Rev 02/23.

## 2024-12-03 ENCOUNTER — HOSPITAL ENCOUNTER (OUTPATIENT)
Facility: CLINIC | Age: 39
Setting detail: OBSERVATION
LOS: 1 days | Discharge: HOME | End: 2024-12-05
Attending: STUDENT IN AN ORGANIZED HEALTH CARE EDUCATION/TRAINING PROGRAM | Admitting: OBSTETRICS & GYNECOLOGY
Payer: COMMERCIAL

## 2024-12-03 ENCOUNTER — APPOINTMENT (OUTPATIENT)
Dept: CT IMAGING | Facility: CLINIC | Age: 39
End: 2024-12-03
Attending: STUDENT IN AN ORGANIZED HEALTH CARE EDUCATION/TRAINING PROGRAM
Payer: COMMERCIAL

## 2024-12-03 DIAGNOSIS — T14.8XXA HEMATOMA: Primary | ICD-10-CM

## 2024-12-03 DIAGNOSIS — G89.18 ACUTE POST-OPERATIVE PAIN: ICD-10-CM

## 2024-12-03 DIAGNOSIS — D64.9 ANEMIA, UNSPECIFIED TYPE: ICD-10-CM

## 2024-12-03 LAB
ALBUMIN SERPL BCG-MCNC: 3.4 G/DL (ref 3.5–5.2)
ALP SERPL-CCNC: 146 U/L (ref 40–150)
ALT SERPL W P-5'-P-CCNC: 18 U/L (ref 0–50)
ANION GAP SERPL CALCULATED.3IONS-SCNC: 13 MMOL/L (ref 7–15)
AST SERPL W P-5'-P-CCNC: 32 U/L (ref 0–45)
BASOPHILS # BLD AUTO: 0 10E3/UL (ref 0–0.2)
BASOPHILS NFR BLD AUTO: 0 %
BILIRUB SERPL-MCNC: 0.2 MG/DL
BUN SERPL-MCNC: 14.4 MG/DL (ref 6–20)
CALCIUM SERPL-MCNC: 8.4 MG/DL (ref 8.8–10.4)
CHLORIDE SERPL-SCNC: 106 MMOL/L (ref 98–107)
CREAT SERPL-MCNC: 0.58 MG/DL (ref 0.51–0.95)
EGFRCR SERPLBLD CKD-EPI 2021: >90 ML/MIN/1.73M2
EOSINOPHIL # BLD AUTO: 0.5 10E3/UL (ref 0–0.7)
EOSINOPHIL NFR BLD AUTO: 6 %
ERYTHROCYTE [DISTWIDTH] IN BLOOD BY AUTOMATED COUNT: 13 % (ref 10–15)
GLUCOSE SERPL-MCNC: 105 MG/DL (ref 70–99)
HCO3 SERPL-SCNC: 23 MMOL/L (ref 22–29)
HCT VFR BLD AUTO: 31.7 % (ref 35–47)
HGB BLD-MCNC: 10.4 G/DL (ref 11.7–15.7)
IMM GRANULOCYTES # BLD: 0.1 10E3/UL
IMM GRANULOCYTES NFR BLD: 1 %
LYMPHOCYTES # BLD AUTO: 1.4 10E3/UL (ref 0.8–5.3)
LYMPHOCYTES NFR BLD AUTO: 16 %
MAGNESIUM SERPL-MCNC: 2 MG/DL (ref 1.7–2.3)
MCH RBC QN AUTO: 29.8 PG (ref 26.5–33)
MCHC RBC AUTO-ENTMCNC: 32.8 G/DL (ref 31.5–36.5)
MCV RBC AUTO: 91 FL (ref 78–100)
MONOCYTES # BLD AUTO: 0.6 10E3/UL (ref 0–1.3)
MONOCYTES NFR BLD AUTO: 6 %
NEUTROPHILS # BLD AUTO: 6.2 10E3/UL (ref 1.6–8.3)
NEUTROPHILS NFR BLD AUTO: 70 %
NRBC # BLD AUTO: 0 10E3/UL
NRBC BLD AUTO-RTO: 0 /100
PLATELET # BLD AUTO: 301 10E3/UL (ref 150–450)
POTASSIUM SERPL-SCNC: 4 MMOL/L (ref 3.4–5.3)
PROT SERPL-MCNC: 6.5 G/DL (ref 6.4–8.3)
RBC # BLD AUTO: 3.49 10E6/UL (ref 3.8–5.2)
SODIUM SERPL-SCNC: 142 MMOL/L (ref 135–145)
WBC # BLD AUTO: 8.9 10E3/UL (ref 4–11)

## 2024-12-03 PROCEDURE — 36415 COLL VENOUS BLD VENIPUNCTURE: CPT | Performed by: STUDENT IN AN ORGANIZED HEALTH CARE EDUCATION/TRAINING PROGRAM

## 2024-12-03 PROCEDURE — 250N000011 HC RX IP 250 OP 636: Performed by: STUDENT IN AN ORGANIZED HEALTH CARE EDUCATION/TRAINING PROGRAM

## 2024-12-03 PROCEDURE — 96374 THER/PROPH/DIAG INJ IV PUSH: CPT

## 2024-12-03 PROCEDURE — 250N000013 HC RX MED GY IP 250 OP 250 PS 637: Performed by: OBSTETRICS & GYNECOLOGY

## 2024-12-03 PROCEDURE — G0378 HOSPITAL OBSERVATION PER HR: HCPCS

## 2024-12-03 PROCEDURE — 80053 COMPREHEN METABOLIC PANEL: CPT | Performed by: STUDENT IN AN ORGANIZED HEALTH CARE EDUCATION/TRAINING PROGRAM

## 2024-12-03 PROCEDURE — 85004 AUTOMATED DIFF WBC COUNT: CPT | Performed by: STUDENT IN AN ORGANIZED HEALTH CARE EDUCATION/TRAINING PROGRAM

## 2024-12-03 PROCEDURE — 250N000011 HC RX IP 250 OP 636: Mod: JZ | Performed by: STUDENT IN AN ORGANIZED HEALTH CARE EDUCATION/TRAINING PROGRAM

## 2024-12-03 PROCEDURE — 74177 CT ABD & PELVIS W/CONTRAST: CPT

## 2024-12-03 PROCEDURE — 99285 EMERGENCY DEPT VISIT HI MDM: CPT | Mod: 25

## 2024-12-03 PROCEDURE — 250N000009 HC RX 250: Performed by: STUDENT IN AN ORGANIZED HEALTH CARE EDUCATION/TRAINING PROGRAM

## 2024-12-03 PROCEDURE — 83735 ASSAY OF MAGNESIUM: CPT | Performed by: STUDENT IN AN ORGANIZED HEALTH CARE EDUCATION/TRAINING PROGRAM

## 2024-12-03 RX ORDER — IOPAMIDOL 755 MG/ML
500 INJECTION, SOLUTION INTRAVASCULAR ONCE
Status: COMPLETED | OUTPATIENT
Start: 2024-12-03 | End: 2024-12-03

## 2024-12-03 RX ORDER — AMOXICILLIN 250 MG
1 CAPSULE ORAL 2 TIMES DAILY
Status: DISCONTINUED | OUTPATIENT
Start: 2024-12-03 | End: 2024-12-05 | Stop reason: HOSPADM

## 2024-12-03 RX ORDER — MORPHINE SULFATE 2 MG/ML
2 INJECTION, SOLUTION INTRAMUSCULAR; INTRAVENOUS EVERY 4 HOURS PRN
Status: DISCONTINUED | OUTPATIENT
Start: 2024-12-03 | End: 2024-12-05 | Stop reason: HOSPADM

## 2024-12-03 RX ORDER — IBUPROFEN 800 MG/1
800 TABLET, FILM COATED ORAL EVERY 6 HOURS PRN
Status: DISCONTINUED | OUTPATIENT
Start: 2024-12-03 | End: 2024-12-05 | Stop reason: HOSPADM

## 2024-12-03 RX ORDER — NIFEDIPINE 10 MG/1
10-20 CAPSULE ORAL
Status: DISCONTINUED | OUTPATIENT
Start: 2024-12-03 | End: 2024-12-05 | Stop reason: HOSPADM

## 2024-12-03 RX ORDER — ACETAMINOPHEN 325 MG/1
650 TABLET ORAL EVERY 4 HOURS PRN
Status: DISCONTINUED | OUTPATIENT
Start: 2024-12-03 | End: 2024-12-05 | Stop reason: HOSPADM

## 2024-12-03 RX ORDER — LABETALOL HYDROCHLORIDE 5 MG/ML
20 INJECTION, SOLUTION INTRAVENOUS
Status: DISCONTINUED | OUTPATIENT
Start: 2024-12-03 | End: 2024-12-05 | Stop reason: HOSPADM

## 2024-12-03 RX ORDER — MORPHINE SULFATE 4 MG/ML
4 INJECTION, SOLUTION INTRAMUSCULAR; INTRAVENOUS ONCE
Status: COMPLETED | OUTPATIENT
Start: 2024-12-03 | End: 2024-12-03

## 2024-12-03 RX ORDER — OXYCODONE HYDROCHLORIDE 5 MG/1
5 TABLET ORAL EVERY 4 HOURS PRN
Status: DISCONTINUED | OUTPATIENT
Start: 2024-12-03 | End: 2024-12-05 | Stop reason: HOSPADM

## 2024-12-03 RX ADMIN — SENNOSIDES AND DOCUSATE SODIUM 1 TABLET: 50; 8.6 TABLET ORAL at 23:30

## 2024-12-03 RX ADMIN — MORPHINE SULFATE 4 MG: 4 INJECTION, SOLUTION INTRAMUSCULAR; INTRAVENOUS at 19:45

## 2024-12-03 RX ADMIN — IOPAMIDOL 84 ML: 755 INJECTION, SOLUTION INTRAVENOUS at 20:20

## 2024-12-03 RX ADMIN — SODIUM CHLORIDE 61 ML: 9 INJECTION, SOLUTION INTRAVENOUS at 20:20

## 2024-12-03 ASSESSMENT — COLUMBIA-SUICIDE SEVERITY RATING SCALE - C-SSRS
2. HAVE YOU ACTUALLY HAD ANY THOUGHTS OF KILLING YOURSELF IN THE PAST MONTH?: NO
1. IN THE PAST MONTH, HAVE YOU WISHED YOU WERE DEAD OR WISHED YOU COULD GO TO SLEEP AND NOT WAKE UP?: NO
6. HAVE YOU EVER DONE ANYTHING, STARTED TO DO ANYTHING, OR PREPARED TO DO ANYTHING TO END YOUR LIFE?: NO

## 2024-12-03 ASSESSMENT — ACTIVITIES OF DAILY LIVING (ADL)
ADLS_ACUITY_SCORE: 50

## 2024-12-04 ENCOUNTER — PATIENT OUTREACH (OUTPATIENT)
Dept: CARE COORDINATION | Facility: CLINIC | Age: 39
End: 2024-12-04
Payer: COMMERCIAL

## 2024-12-04 LAB
BASOPHILS # BLD AUTO: 0 10E3/UL (ref 0–0.2)
BASOPHILS NFR BLD AUTO: 0 %
EOSINOPHIL # BLD AUTO: 0.5 10E3/UL (ref 0–0.7)
EOSINOPHIL NFR BLD AUTO: 6 %
ERYTHROCYTE [DISTWIDTH] IN BLOOD BY AUTOMATED COUNT: 12.7 % (ref 10–15)
HCT VFR BLD AUTO: 28.1 % (ref 35–47)
HGB BLD-MCNC: 10.1 G/DL (ref 11.7–15.7)
HGB BLD-MCNC: 9.3 G/DL (ref 11.7–15.7)
HGB BLD-MCNC: 9.4 G/DL (ref 11.7–15.7)
IMM GRANULOCYTES # BLD: 0.1 10E3/UL
IMM GRANULOCYTES NFR BLD: 2 %
LYMPHOCYTES # BLD AUTO: 1.4 10E3/UL (ref 0.8–5.3)
LYMPHOCYTES NFR BLD AUTO: 18 %
MCH RBC QN AUTO: 30 PG (ref 26.5–33)
MCHC RBC AUTO-ENTMCNC: 33.5 G/DL (ref 31.5–36.5)
MCV RBC AUTO: 90 FL (ref 78–100)
MONOCYTES # BLD AUTO: 0.7 10E3/UL (ref 0–1.3)
MONOCYTES NFR BLD AUTO: 9 %
NEUTROPHILS # BLD AUTO: 5 10E3/UL (ref 1.6–8.3)
NEUTROPHILS NFR BLD AUTO: 66 %
NRBC # BLD AUTO: 0 10E3/UL
NRBC BLD AUTO-RTO: 0 /100
PLATELET # BLD AUTO: 262 10E3/UL (ref 150–450)
RBC # BLD AUTO: 3.13 10E6/UL (ref 3.8–5.2)
WBC # BLD AUTO: 7.6 10E3/UL (ref 4–11)

## 2024-12-04 PROCEDURE — 85014 HEMATOCRIT: CPT | Performed by: OBSTETRICS & GYNECOLOGY

## 2024-12-04 PROCEDURE — 85018 HEMOGLOBIN: CPT | Performed by: OBSTETRICS & GYNECOLOGY

## 2024-12-04 PROCEDURE — 36415 COLL VENOUS BLD VENIPUNCTURE: CPT | Performed by: OBSTETRICS & GYNECOLOGY

## 2024-12-04 PROCEDURE — 85004 AUTOMATED DIFF WBC COUNT: CPT | Performed by: OBSTETRICS & GYNECOLOGY

## 2024-12-04 PROCEDURE — G0378 HOSPITAL OBSERVATION PER HR: HCPCS

## 2024-12-04 PROCEDURE — 85025 COMPLETE CBC W/AUTO DIFF WBC: CPT | Performed by: OBSTETRICS & GYNECOLOGY

## 2024-12-04 PROCEDURE — 250N000013 HC RX MED GY IP 250 OP 250 PS 637: Performed by: OBSTETRICS & GYNECOLOGY

## 2024-12-04 RX ADMIN — IBUPROFEN 800 MG: 800 TABLET, FILM COATED ORAL at 21:26

## 2024-12-04 RX ADMIN — IBUPROFEN 800 MG: 800 TABLET, FILM COATED ORAL at 06:39

## 2024-12-04 RX ADMIN — ACETAMINOPHEN 650 MG: 325 TABLET, FILM COATED ORAL at 18:49

## 2024-12-04 RX ADMIN — SENNOSIDES AND DOCUSATE SODIUM 1 TABLET: 50; 8.6 TABLET ORAL at 21:26

## 2024-12-04 RX ADMIN — IBUPROFEN 800 MG: 800 TABLET, FILM COATED ORAL at 00:23

## 2024-12-04 RX ADMIN — ACETAMINOPHEN 650 MG: 325 TABLET, FILM COATED ORAL at 14:18

## 2024-12-04 RX ADMIN — SENNOSIDES AND DOCUSATE SODIUM 1 TABLET: 50; 8.6 TABLET ORAL at 09:06

## 2024-12-04 RX ADMIN — IBUPROFEN 800 MG: 800 TABLET, FILM COATED ORAL at 14:17

## 2024-12-04 RX ADMIN — ACETAMINOPHEN 650 MG: 325 TABLET, FILM COATED ORAL at 09:06

## 2024-12-04 ASSESSMENT — ACTIVITIES OF DAILY LIVING (ADL)
ADLS_ACUITY_SCORE: 24
ADLS_ACUITY_SCORE: 50
ADLS_ACUITY_SCORE: 50
ADLS_ACUITY_SCORE: 24
ADLS_ACUITY_SCORE: 50
ADLS_ACUITY_SCORE: 50
ADLS_ACUITY_SCORE: 24
ADLS_ACUITY_SCORE: 50
ADLS_ACUITY_SCORE: 24
ADLS_ACUITY_SCORE: 50
ADLS_ACUITY_SCORE: 50
ADLS_ACUITY_SCORE: 24
ADLS_ACUITY_SCORE: 50
ADLS_ACUITY_SCORE: 50
ADLS_ACUITY_SCORE: 24
ADLS_ACUITY_SCORE: 50
ADLS_ACUITY_SCORE: 24

## 2024-12-04 NOTE — H&P
"December 3, 2024      38 yo  s/p emergent repeat LTCS on  due to heavy VB, who presents to the ER for worsening lower abdominal pain.  She was discharged yesterday AM.  Today had worsening abdominal pain, described as 10/10.  No BM since her discharge.  No fevers or chills.  The pt received 1 dose of morphine 4 mg x 1.  Her initial BP was elevated, however repeat BPs since then have been normal range after pain medication.  She remained afebrile, with a normal WBC and Hgb 10.4.  Hgb was 12.3 POD#0, however was not repeated prior to discharge.    CT abd/pelvic reviewed enlarged postpartum uterus, with a periuterine fluid collection anterior to the uterus likely representing a hematoma measuring 6 x 3 cm.      Past Medical History:   Diagnosis Date    Gestational diabetes      Past Surgical History:   Procedure Laterality Date     SECTION N/A 2018    Procedure:  SECTION;;  Surgeon: Marjan Bruner MD;  Location: [facility] L[facility]     SECTION N/A 2024    Procedure:  section;  Surgeon: Marjan Bruner MD;  Location: [facility] L[facility]    LAPAROSCOPIC CHOLECYSTECTOMY  2014    Procedure: LAPAROSCOPIC CHOLECYSTECTOMY;  Surgeon: Camacho Jeronimo MD;  Location: [facility] OR       /73   Pulse 105   Temp 97.3  F (36.3  C) (Temporal)   Resp 18   Wt 76.5 kg (168 lb 10.4 oz)   LMP 2024   SpO2 100%   Breastfeeding Yes     Lab Results   Component Value Date    WBC 8.9 2024    WBC 4.8 07/15/2020     Lab Results   Component Value Date    RBC 3.49 2024    RBC 4.62 07/15/2020     Lab Results   Component Value Date    HGB 10.4 2024    HGB 13.8 07/15/2020     Lab Results   Component Value Date    HCT 31.7 2024    HCT 42.3 07/15/2020     No components found for: \"MCT\"  Lab Results   Component Value Date    MCV 91 2024    MCV 92 07/15/2020     Lab Results   Component Value Date    MCH 29.8 2024    MCH 29.9 07/15/2020     Lab " Results   Component Value Date    MCHC 32.8 12/03/2024    MCHC 32.6 07/15/2020     Lab Results   Component Value Date    RDW 13.0 12/03/2024    RDW 11.9 07/15/2020     Lab Results   Component Value Date     12/03/2024     07/15/2020     Last Comprehensive Metabolic Panel:  Sodium   Date Value Ref Range Status   12/03/2024 142 135 - 145 mmol/L Final   07/15/2020 139 133 - 144 mmol/L Final     Potassium   Date Value Ref Range Status   12/03/2024 4.0 3.4 - 5.3 mmol/L Final   03/12/2022 3.8 3.4 - 5.3 mmol/L Final   07/15/2020 3.6 3.4 - 5.3 mmol/L Final     Chloride   Date Value Ref Range Status   12/03/2024 106 98 - 107 mmol/L Final   03/12/2022 106 94 - 109 mmol/L Final   07/15/2020 106 94 - 109 mmol/L Final     Carbon Dioxide   Date Value Ref Range Status   07/15/2020 29 20 - 32 mmol/L Final     Carbon Dioxide (CO2)   Date Value Ref Range Status   12/03/2024 23 22 - 29 mmol/L Final   03/12/2022 31 20 - 32 mmol/L Final     Anion Gap   Date Value Ref Range Status   12/03/2024 13 7 - 15 mmol/L Final   03/12/2022 2 (L) 3 - 14 mmol/L Final   07/15/2020 4 3 - 14 mmol/L Final     Glucose   Date Value Ref Range Status   12/03/2024 105 (H) 70 - 99 mg/dL Final   03/12/2022 100 (H) 70 - 99 mg/dL Final   07/15/2020 76 70 - 99 mg/dL Final     Urea Nitrogen   Date Value Ref Range Status   12/03/2024 14.4 6.0 - 20.0 mg/dL Final   03/12/2022 10 7 - 30 mg/dL Final   07/15/2020 9 7 - 30 mg/dL Final     Creatinine   Date Value Ref Range Status   12/03/2024 0.58 0.51 - 0.95 mg/dL Final   07/15/2020 0.52 0.52 - 1.04 mg/dL Final     GFR Estimate   Date Value Ref Range Status   12/03/2024 >90 >60 mL/min/1.73m2 Final     Comment:     eGFR calculated using 2021 CKD-EPI equation.   07/15/2020 >90 >60 mL/min/[1.73_m2] Final     Comment:     Non  GFR Calc  Starting 12/18/2018, serum creatinine based estimated GFR (eGFR) will be   calculated using the Chronic Kidney Disease Epidemiology Collaboration   (CKD-EPI)  equation.       Calcium   Date Value Ref Range Status   2024 8.4 (L) 8.8 - 10.4 mg/dL Final     Comment:     Reference intervals for this test were updated on 2024 to reflect our healthy population more accurately. There may be differences in the flagging of prior results with similar values performed with this method. Those prior results can be interpreted in the context of the updated reference intervals.   07/15/2020 8.9 8.5 - 10.1 mg/dL Final     Bilirubin Total   Date Value Ref Range Status   2024 0.2 <=1.2 mg/dL Final   07/15/2020 0.4 0.2 - 1.3 mg/dL Final     Alkaline Phosphatase   Date Value Ref Range Status   2024 146 40 - 150 U/L Final   07/15/2020 76 40 - 150 U/L Final     ALT   Date Value Ref Range Status   2024 18 0 - 50 U/L Final   07/15/2020 19 0 - 50 U/L Final     AST   Date Value Ref Range Status   2024 32 0 - 45 U/L Final   07/15/2020 21 0 - 45 U/L Final       A/P: 40 yo  s/p emergent repeat LTCS on  due to heavy VB, who presents to the ER for worsening lower abdominal pain. CT abd/pelvis reveals fluid collection anterior to the uterus, likely hematoma.  The pt is otherwise hemodynamically stable by vitals and exam.  Pain significantly improved s/p morphine 4 mg IV x 1 dose in the ER.  - will plan for observation overnight for pain management, as well as repeat Hgb to assess stability  - pending clinical status, may need repeat imaging  - no evidence of infection at this time with normal WBC and absent fever  - routine post-op care      KARI ROSADO MD

## 2024-12-04 NOTE — ED PROVIDER NOTES
Emergency Department Note      History of Present Illness     Chief Complaint   Abdominal Pain and Post-op Problem  Formal [facility]  used to obtain HPI.     HPI   Lorna Kenyon is a 39 year old female with history of ovarian cysts, iron deficiency anemia, and a recent  section who presents to the ED with a family member for evaluation of abdominal pain and a post-op problem. Lorna had a  delivery on , she had pain at the site of the incision, this pain worsened and began feeling tight. Lorna rates the pain a 10/10. She denies fever, vomiting, dysuria, abnormal vaginal discharge/bleeding, shortness of breath, headache, vision changes, or leg edema. Since leaving the hospital she's been unable to produce a bowel movement, she had no issue with this while in the hospital.     Independent Historian   None    Review of External Notes   I reviewed the discharge summary from .    Past Medical History     Medical History and Problem List   Allergic rhinitis   GERD   Heart murmur   Hyperemesis gravidarum mild  Iron deficiency anemia   Ovarian cyst   Vitamin D deficiency     Medications   Flexeril   Roxicodone   Senokot-S     Surgical History   Past Surgical History:   Procedure Laterality Date     SECTION N/A 2018    Procedure:  SECTION;;  Surgeon: Marjan Bruner MD;  Location: [facility] L+[facility]     SECTION N/A 2024    Procedure:  section;  Surgeon: Marjan Bruner MD;  Location: [facility] L+[facility]    LAPAROSCOPIC CHOLECYSTECTOMY  2014    Procedure: LAPAROSCOPIC CHOLECYSTECTOMY;  Surgeon: Camacho Jeronimo MD;  Location:  OR     Physical Exam     Patient Vitals for the past 24 hrs:   BP Temp Temp src Pulse Resp SpO2 Weight   246 113/73 -- -- 105 -- 100 % --   24 110/69 -- -- 101 -- 100 % --   24 108/78 -- -- 97 -- 100 % --   24 -- -- -- -- -- 100 % --   24 112/71 -- -- 100  -- -- --   24 -- -- -- -- -- 100 % --   24 119/81 -- -- 99 -- -- --   24 -- -- -- -- -- 100 % --   24 122/85 -- -- 92 -- -- --   24 (!) 175/98 97.3  F (36.3  C) Temporal 100 18 98 % --   24 -- -- -- -- -- -- 76.5 kg (168 lb 10.4 oz)     Physical Exam  GENERAL: Patient uncomfortable, but nontoxic.  HEAD: Atraumatic.  NECK: No rigidity  CV: RRR, no murmurs, rubs or gallops  PULM: CTAB with good aeration; no retractions, rales, rhonchi, or wheezing  ABD: Soft, palpable fullness over lower abdomen with focal tenderness, above  site.  No overlying skin erythema.  Surgical incision is healing well without signs of infection or pus.  DERM: No rash. Skin warm and dry  EXTREMITY: Moving all extremities without difficulty. No calf tenderness.  Trace lower extremity edema.       Diagnostics     Lab Results   Labs Ordered and Resulted from Time of ED Arrival to Time of ED Departure   COMPREHENSIVE METABOLIC PANEL - Abnormal       Result Value    Sodium 142      Potassium 4.0      Carbon Dioxide (CO2) 23      Anion Gap 13      Urea Nitrogen 14.4      Creatinine 0.58      GFR Estimate >90      Calcium 8.4 (*)     Chloride 106      Glucose 105 (*)     Alkaline Phosphatase 146      AST 32      ALT 18      Protein Total 6.5      Albumin 3.4 (*)     Bilirubin Total 0.2     CBC WITH PLATELETS AND DIFFERENTIAL - Abnormal    WBC Count 8.9      RBC Count 3.49 (*)     Hemoglobin 10.4 (*)     Hematocrit 31.7 (*)     MCV 91      MCH 29.8      MCHC 32.8      RDW 13.0      Platelet Count 301      % Neutrophils 70      % Lymphocytes 16      % Monocytes 6      % Eosinophils 6      % Basophils 0      % Immature Granulocytes 1      NRBCs per 100 WBC 0      Absolute Neutrophils 6.2      Absolute Lymphocytes 1.4      Absolute Monocytes 0.6      Absolute Eosinophils 0.5      Absolute Basophils 0.0      Absolute Immature Granulocytes 0.1      Absolute NRBCs 0.0     MAGNESIUM -  Normal    Magnesium 2.0     PROTEIN RANDOM URINE     Imaging   CT Abdomen Pelvis w Contrast   Final Result   IMPRESSION:    1.  Enlarged postpartum uterus with  scar, and periuterine fluid collection likely representing a large postoperative hematoma/seroma.   2.  Additional findings as above.        Independent Interpretation   None    ED Course      Medications Administered   Medications   NIFEdipine (PROCARDIA) capsule 10-20 mg (has no administration in time range)   labetalol (NORMODYNE/TRANDATE) injection 20 mg (has no administration in time range)   ibuprofen (ADVIL/MOTRIN) tablet 800 mg (has no administration in time range)   acetaminophen (TYLENOL) tablet 650 mg (has no administration in time range)   oxyCODONE (ROXICODONE) tablet 5 mg (has no administration in time range)   senna-docusate (SENOKOT-S/PERICOLACE) 8.6-50 MG per tablet 1 tablet (has no administration in time range)   morphine (PF) injection 2 mg (has no administration in time range)   morphine (PF) injection 4 mg (4 mg Intravenous $Given 12/3/24 1945)   CT Scan Flush (61 mLs Intravenous $Given 12/3/24 2020)   iopamidol (ISOVUE-370) solution 500 mL (84 mLs Intravenous $Given 12/3/24 2020)     Procedures   Procedures     Discussion of Management   OB/GYN, Dr. Franks    ED Course   ED Course as of 12/03/24 2212   Tue Dec 03, 2024   1922 I obtained history and examined the patient as noted above.     I spoke to OBGYN Dr. Franks regarding the patient's current symptoms.    I spoke to OBGYN Dr. Franks, I detailed the patient's labs, the findings of her CT scan, and her pain level after pain management.     Additional Documentation  None    Medical Decision Making / Diagnosis     CMS Diagnoses: None    MIPS       None    MDM   Lorna Kenyon is a 39 year old female, postpartum, presenting with lower abdominal pain.  Initial blood pressure was in the 170s systolic.  We repeated it quickly and it normalized.  We repeated multiple  times and all the subsequent values have been reassuring.  Thus, do not think this is preeclampsia.  I think the first value was spurious, plus she was uncomfortable due to her abdominal pain.  Patient with anemia, with hemoglobin 10.4.  4 days prior it was 12.3.  Denies any active vaginal bleeding.  Ordered CT scan which shows postoperative seroma versus hematoma.  Patient was given IV morphine and her pain resolved.  However she is  with palpation of the abdomen.  I utilized an  for discussion.  I spoke with on-call OB/GYN who agrees with admission and serial exams, but that patient does not require emergent or intervention.  Patient to be admitted.    Disposition   The patient was admitted to the hospital.     Diagnosis     ICD-10-CM    1. Postpartum hematoma  O71.7       2. Acute post-operative pain  G89.18            Discharge Medications   New Prescriptions    No medications on file     Scribe Disclosure:  MARY, Maryse Majano, am serving as a scribe at 7:22 PM on 12/3/2024 to document services personally performed by Ravi Quigley MD based on my observations and the provider's statements to me.        Ravi Quigley MD  12/03/24 8467

## 2024-12-04 NOTE — PLAN OF CARE
"Goal Outcome Evaluation:      Plan of Care Reviewed With: patient    Overall Patient Progress: improvingOverall Patient Progress: improving         Tachycardic, other VS WNL. No headache, dizziness, changes in vision, chest pain, or RUQ pain noted. No difficulty breathing. Voiding and ambulating without difficulty. No complaints of uterine pain but with tightness/rigidity on lower abdomen. iPad  used. Patient aware of PRN pain meds. Warm packs provided for abdominal discomfort. SL flushed, patent. ,  and mother in room. Family aware that someone else has to be present in the room for baby cares. Hgb at 2330H was 10.1. For CBC this AM.    Patient's  said patient's last cracker intake was about 0300H. Reminded them to do sips of water for now until patient is seen by MD.      Patient initially said they are formula-feeding baby the later requested to pump. Pumping supplies provided. T-pump ordered as patient is also having back pains.        Problem: Adult Inpatient Plan of Care  Goal: Plan of Care Review  Description: The Plan of Care Review/Shift note should be completed every shift.  The Outcome Evaluation is a brief statement about your assessment that the patient is improving, declining, or no change.  This information will be displayed automatically on your shift  note.  2024 by Kathryn Conway RN  Outcome: Not Progressing  2024 by Kathryn Conway, RN  Outcome: Progressing  Flowsheets (Taken 2024)  Plan of Care Reviewed With: patient  Overall Patient Progress: improving  Goal: Patient-Specific Goal (Individualized)  Description: You can add care plan individualizations to a care plan. Examples of Individualization might be:  \"Parent requests to be called daily at 9am for status\", \"I have a hard time hearing out of my right ear\", or \"Do not touch me to wake me up as it startles  me\".  2024 by Kathryn Conway, RN  Outcome: Not " Progressing  12/4/2024 0535 by Kathryn Conway RN  Outcome: Progressing  Goal: Absence of Hospital-Acquired Illness or Injury  12/4/2024 0535 by Kathryn Conway RN  Outcome: Not Progressing  12/4/2024 0535 by Kathryn Conway RN  Outcome: Progressing  Intervention: Prevent Skin Injury  Recent Flowsheet Documentation  Taken 12/3/2024 2305 by Kathryn Conway RN  Body Position: position changed independently  Intervention: Prevent Infection  Recent Flowsheet Documentation  Taken 12/3/2024 2305 by Kathryn Conway RN  Infection Prevention:   hand hygiene promoted   rest/sleep promoted  Goal: Optimal Comfort and Wellbeing  12/4/2024 0535 by Kathryn Conway RN  Outcome: Not Progressing  12/4/2024 0535 by Kathryn Conway RN  Outcome: Progressing  Intervention: Monitor Pain and Promote Comfort  Recent Flowsheet Documentation  Taken 12/4/2024 0023 by Kathryn Conway RN  Pain Management Interventions: medication (see MAR)  Intervention: Provide Person-Centered Care  Recent Flowsheet Documentation  Taken 12/3/2024 2305 by Kathryn Conway RN  Trust Relationship/Rapport:   care explained   choices provided  Goal: Readiness for Transition of Care  12/4/2024 0535 by Kathryn Conway RN  Outcome: Not Progressing  12/4/2024 0535 by Kathryn Conway RN  Outcome: Progressing

## 2024-12-04 NOTE — PLAN OF CARE
"Goal Outcome Evaluation:      Plan of Care Reviewed With: patient    Overall Patient Progress: improvingOverall Patient Progress: improving    Outcome Evaluation: pain managed    Patient has been resting. Have encouraged walking in room/halls. Taking Tylenol and Ibuprofen for abdominal discomfort and warm packs PRN. Patient states pain has lessoned and feels much better.  Plan for HGB this afternoon and tomorrow morning. Patient denies feeling dizzy or lightheaded when up out of bed. Eating and drinking normally. VSS.    Problem: Adult Inpatient Plan of Care  Goal: Plan of Care Review  Description: The Plan of Care Review/Shift note should be completed every shift.  The Outcome Evaluation is a brief statement about your assessment that the patient is improving, declining, or no change.  This information will be displayed automatically on your shift  note.  12/4/2024 1536 by Malena Joshi RN  Outcome: Progressing  Flowsheets (Taken 12/4/2024 1536)  Outcome Evaluation: pain managed  Plan of Care Reviewed With: patient  Overall Patient Progress: improving  12/4/2024 1052 by Malena Joshi RN  Outcome: Progressing  Flowsheets (Taken 12/4/2024 1052)  Outcome Evaluation: VSS, pain managed  Plan of Care Reviewed With:   patient   spouse   family  Overall Patient Progress: improving  Goal: Patient-Specific Goal (Individualized)  Description: You can add care plan individualizations to a care plan. Examples of Individualization might be:  \"Parent requests to be called daily at 9am for status\", \"I have a hard time hearing out of my right ear\", or \"Do not touch me to wake me up as it startles  me\".  12/4/2024 1536 by Malena Joshi RN  Outcome: Progressing  12/4/2024 1052 by Malena Joshi RN  Outcome: Progressing  Goal: Absence of Hospital-Acquired Illness or Injury  12/4/2024 1536 by Malena Joshi RN  Outcome: Progressing  12/4/2024 1052 by Malena Joshi, " RN  Outcome: Progressing  Intervention: Prevent Skin Injury  Recent Flowsheet Documentation  Taken 12/4/2024 0906 by Malena Joshi RN  Body Position: position changed independently  Intervention: Prevent Infection  Recent Flowsheet Documentation  Taken 12/4/2024 0906 by Malena Joshi RN  Infection Prevention:   hand hygiene promoted   rest/sleep promoted  Goal: Optimal Comfort and Wellbeing  12/4/2024 1536 by Malena Joshi RN  Outcome: Progressing  12/4/2024 1052 by Malena Joshi RN  Outcome: Progressing  Intervention: Monitor Pain and Promote Comfort  Recent Flowsheet Documentation  Taken 12/4/2024 0906 by Malena Joshi RN  Pain Management Interventions: medication (see MAR)  Intervention: Provide Person-Centered Care  Recent Flowsheet Documentation  Taken 12/4/2024 0906 by Malena Joshi RN  Trust Relationship/Rapport:   care explained   choices provided   emotional support provided   empathic listening provided   questions answered   questions encouraged   reassurance provided   thoughts/feelings acknowledged  Goal: Readiness for Transition of Care  12/4/2024 1536 by Malena Joshi RN  Outcome: Progressing  12/4/2024 1052 by Malena Joshi RN  Outcome: Progressing  Intervention: Mutually Develop Transition Plan  Recent Flowsheet Documentation  Taken 12/4/2024 1000 by Malena Joshi RN  Equipment Currently Used at Home: none     Problem: Pain Acute  Goal: Optimal Pain Control and Function  12/4/2024 1536 by Malena Joshi RN  Outcome: Progressing  12/4/2024 1052 by Malena Joshi RN  Outcome: Progressing  Intervention: Optimize Psychosocial Wellbeing  Recent Flowsheet Documentation  Taken 12/4/2024 0906 by Malena Joshi RN  Spiritual Activities Assistance: affirmation provided  Supportive Measures:   active listening utilized   decision-making supported   goal-setting facilitated   positive  reinforcement provided   self-care encouraged   verbalization of feelings encouraged  Diversional Activities: smartphone  Intervention: Develop Pain Management Plan  Recent Flowsheet Documentation  Taken 12/4/2024 0906 by Malena Joshi, RN  Pain Management Interventions: medication (see MAR)  Intervention: Prevent or Manage Pain  Recent Flowsheet Documentation  Taken 12/4/2024 0906 by Malena Joshi, RN  Sensory Stimulation Regulation: care clustered  Bowel Elimination Promotion:   adequate fluid intake promoted   ambulation promoted

## 2024-12-04 NOTE — ED NOTES
"[facility]  ED Nurse Handoff Report    ED Chief complaint: Abdominal Pain and Post-op Problem  . ED Diagnosis:   Final diagnoses:   Postpartum hematoma   Acute post-operative pain       Allergies: No Known Allergies    Code Status: Full Code    Activity level - Baseline/Home:  independent.  Activity Level - Current:   standby.   Lift room needed: No.   Bariatric: No   Needed: No   Isolation: No.   Infection: Not Applicable.     Respiratory status: Room air    Vital Signs (within 30 minutes):   Vitals:    24 2033 24 2045 24 2101 24   BP:  108/78 110/69 113/73   Pulse:  97 101 105   Resp:       Temp:       TempSrc:       SpO2: 100% 100% 100% 100%   Weight:           Cardiac Rhythm:  ,      Pain level:    Patient confused: No.   Patient Falls Risk: patient and family education.   Elimination Status: Has voided     Patient Report - Initial Complaint: abdominal pain.   Focused Assessment: Pt to ER w c/o abdominal pain. Pt had  on , developed a lot of pain at incision site about 1 hour ago. Describes pain as \"tight\". States everything was fine this morning. Rates pain 10/10. VSS except hypertensive 175/98, ABCs intact, A&Ox4.       Abnormal Results:   Labs Ordered and Resulted from Time of ED Arrival to Time of ED Departure   COMPREHENSIVE METABOLIC PANEL - Abnormal       Result Value    Sodium 142      Potassium 4.0      Carbon Dioxide (CO2) 23      Anion Gap 13      Urea Nitrogen 14.4      Creatinine 0.58      GFR Estimate >90      Calcium 8.4 (*)     Chloride 106      Glucose 105 (*)     Alkaline Phosphatase 146      AST 32      ALT 18      Protein Total 6.5      Albumin 3.4 (*)     Bilirubin Total 0.2     CBC WITH PLATELETS AND DIFFERENTIAL - Abnormal    WBC Count 8.9      RBC Count 3.49 (*)     Hemoglobin 10.4 (*)     Hematocrit 31.7 (*)     MCV 91      MCH 29.8      MCHC 32.8      RDW 13.0      Platelet Count 301      % Neutrophils 70      " % Lymphocytes 16      % Monocytes 6      % Eosinophils 6      % Basophils 0      % Immature Granulocytes 1      NRBCs per 100 WBC 0      Absolute Neutrophils 6.2      Absolute Lymphocytes 1.4      Absolute Monocytes 0.6      Absolute Eosinophils 0.5      Absolute Basophils 0.0      Absolute Immature Granulocytes 0.1      Absolute NRBCs 0.0     MAGNESIUM - Normal    Magnesium 2.0     PROTEIN RANDOM URINE        CT Abdomen Pelvis w Contrast   Final Result   IMPRESSION:    1.  Enlarged postpartum uterus with  scar, and periuterine fluid collection likely representing a large postoperative hematoma/seroma.   2.  Additional findings as above.          Treatments provided: See MAR  Family Comments: family at bedside  OBS brochure/video discussed/provided to patient:  Yes  ED Medications:   Medications   NIFEdipine (PROCARDIA) capsule 10-20 mg (has no administration in time range)   labetalol (NORMODYNE/TRANDATE) injection 20 mg (has no administration in time range)   ibuprofen (ADVIL/MOTRIN) tablet 800 mg (has no administration in time range)   acetaminophen (TYLENOL) tablet 650 mg (has no administration in time range)   oxyCODONE (ROXICODONE) tablet 5 mg (has no administration in time range)   senna-docusate (SENOKOT-S/PERICOLACE) 8.6-50 MG per tablet 1 tablet (has no administration in time range)   morphine (PF) injection 2 mg (has no administration in time range)   morphine (PF) injection 4 mg (4 mg Intravenous $Given 12/3/24 1945)   CT Scan Flush (61 mLs Intravenous $Given 12/3/24 2020)   iopamidol (ISOVUE-370) solution 500 mL (84 mLs Intravenous $Given 12/3/24 2020)       Drips infusing:  No  For the majority of the shift this patient was Green.   Interventions performed were N/A.    Sepsis treatment initiated: No    Cares/treatment/interventions/medications to be completed following ED care: N/A    ED Nurse Name: Trish Farrell RN  9:52 PM

## 2024-12-04 NOTE — PHARMACY-ADMISSION MEDICATION HISTORY
Pharmacist Admission Medication History    Admission medication history is complete. The information provided in this note is only as accurate as the sources available at the time of the update.    Information Source(s): Patient, Family member, and CareEverywhere/SureScripts via in-person    Pertinent Information:     Changes made to PTA medication list:  Added: None  Deleted: None  Changed: None    Allergies reviewed with patient and updates made in EHR: yes    Medication History Completed By: Amanda Burnett RPH 12/4/2024 9:42 AM    PTA Med List   Medication Sig Last Dose/Taking    acetaminophen 325 MG tablet Take 2 tablets (650 mg) by mouth every 6 hours as needed for mild pain. Taking As Needed    ibuprofen (ADVIL/MOTRIN) 800 MG tablet Take 1 tablet (800 mg) by mouth every 8 hours as needed for moderate pain. 12/2/2024    oxyCODONE (ROXICODONE) 5 MG tablet Take 1 tablet (5 mg) by mouth every 6 hours as needed for breakthrough pain. 12/2/2024    Prenatal Vit-Fe Fumarate-FA (PRENATAL VITAMIN PO) Take 1 tablet by mouth At Bedtime 12/2/2024    senna-docusate (SENOKOT-S/PERICOLACE) 8.6-50 MG tablet Take 2 tablets by mouth 2 times daily as needed for constipation. 12/2/2024

## 2024-12-04 NOTE — PROGRESS NOTES
HD2    Patient feels much better since admission. Review of MAR shows she did not require Ibuprofen or Oxycodone over night. Her  reports that she was takingTylenol, Ibu and Oxy q6 at home    AFVSS  Abdomen tender midway between fundus and symphysis  Incision CDI    Hemoglobin   Date Value Ref Range Status   2024 9.4 (L) 11.7 - 15.7 g/dL Final   2024 10.1 (L) 11.7 - 15.7 g/dL Final   07/15/2020 13.8 11.7 - 15.7 g/dL Final   2019 14.5 11.7 - 15.7 g/dL Final        A/P Patient is POD 5 s/p  section who is readmitted through ER for post operative pain thought to be due to hematoma anterior to the uterus    Hemodynamically stable  Pain managed overnight without narcotics    Plan serial Hgb. Assess pain management with ambulation    Expect discharge tomorrow

## 2024-12-04 NOTE — PLAN OF CARE
"Goal Outcome Evaluation:      Plan of Care Reviewed With: patient, spouse, family    Overall Patient Progress: improvingOverall Patient Progress: improving    Outcome Evaluation: VSS, pain managed    Patient in room with many family members visiting and assisting with infant cares. Patient is pumping and feeding infant EBM, and formula.   Previous c section incision is BRANDO, well approximated, no drainage or signs of infection noted. Abdomen above incision line remains firm and tender. Patient aware that she has a hematoma to this area and that it will take some time to reabsorb. Encouraging walking in room and halls. Taking Ibuprofen to manage abdominal pain and is aware that Oxycodone and Tylenol are also available. Prefers heat to areas that are tender: MD aware. VSS. Voiding without difficulty. Passing gas. LS clear and BS present. IV SL.     Problem: Adult Inpatient Plan of Care  Goal: Plan of Care Review  Description: The Plan of Care Review/Shift note should be completed every shift.  The Outcome Evaluation is a brief statement about your assessment that the patient is improving, declining, or no change.  This information will be displayed automatically on your shift  note.  Outcome: Progressing  Flowsheets (Taken 12/4/2024 1052)  Outcome Evaluation: VSS, pain managed  Plan of Care Reviewed With:   patient   spouse   family  Overall Patient Progress: improving  Goal: Patient-Specific Goal (Individualized)  Description: You can add care plan individualizations to a care plan. Examples of Individualization might be:  \"Parent requests to be called daily at 9am for status\", \"I have a hard time hearing out of my right ear\", or \"Do not touch me to wake me up as it startles  me\".  Outcome: Progressing  Goal: Absence of Hospital-Acquired Illness or Injury  Outcome: Progressing  Goal: Optimal Comfort and Wellbeing  Outcome: Progressing  Goal: Readiness for Transition of Care  Outcome: Progressing  Intervention: " Mutually Develop Transition Plan  Recent Flowsheet Documentation  Taken 12/4/2024 1000 by Malena Joshi, RN  Equipment Currently Used at Home: none     Problem: Pain Acute  Goal: Optimal Pain Control and Function  Outcome: Progressing

## 2024-12-04 NOTE — PROGRESS NOTES
[facility]:   [facility] Contact  [facility]/Voicemail     Clinical Data: Post-Discharge Outreach     Outreach attempted x 2.  Left message on patient's voicemail, providing [facility]'s central phone number of [phone] ([phone]) for questions/concerns and/or to schedule an appt with an [facility] provider, if they do not have a PCP.      Plan:  [facility] will do no further outreaches at this time.       Cinthya Ruiz  Community Health Worker  [facility], [facility]  [phone]      *Connected Care Resource Team does NOT follow patient ongoing. Referrals are identified based on internal discharge reports and the outreach is to ensure patient has an understanding of their discharge instructions.

## 2024-12-04 NOTE — ED TRIAGE NOTES
"Pt to ER w c/o abdominal pain. Pt had  on , developed a lot of pain at incision site about 1 hour ago. Describes pain as \"tight\". States everything was fine this morning. Rates pain 10/10. VSS except hypertensive 175/98, ABCs intact, A&Ox4.        "

## 2024-12-04 NOTE — PROVIDER NOTIFICATION
12/03/24 8999   Provider Notification   Provider Name/Title Dr. Franks   Method of Notification Electronic Page   Request Evaluate-Remote   Notification Reason Status Update;Other     MD said okay not to collect Protein Random Urine sample- ordered at the ED for severe BP. Also, result would not be accurate since patient has lochia. For Hgb check tonight.    Patient with normal BP, heart rate of 110. Complaining pressure on lower abdomen but no pain.

## 2024-12-05 VITALS
OXYGEN SATURATION: 100 % | SYSTOLIC BLOOD PRESSURE: 121 MMHG | TEMPERATURE: 98 F | HEART RATE: 82 BPM | RESPIRATION RATE: 18 BRPM | WEIGHT: 166.2 LBS | DIASTOLIC BLOOD PRESSURE: 73 MMHG

## 2024-12-05 LAB — HGB BLD-MCNC: 9.6 G/DL (ref 11.7–15.7)

## 2024-12-05 PROCEDURE — 85018 HEMOGLOBIN: CPT | Performed by: OBSTETRICS & GYNECOLOGY

## 2024-12-05 PROCEDURE — 250N000013 HC RX MED GY IP 250 OP 250 PS 637: Performed by: OBSTETRICS & GYNECOLOGY

## 2024-12-05 PROCEDURE — G0378 HOSPITAL OBSERVATION PER HR: HCPCS

## 2024-12-05 PROCEDURE — 36415 COLL VENOUS BLD VENIPUNCTURE: CPT | Performed by: OBSTETRICS & GYNECOLOGY

## 2024-12-05 RX ORDER — OXYCODONE HYDROCHLORIDE 5 MG/1
5 TABLET ORAL EVERY 6 HOURS PRN
Qty: 20 TABLET | Refills: 0 | Status: SHIPPED | OUTPATIENT
Start: 2024-12-05

## 2024-12-05 RX ADMIN — ACETAMINOPHEN 650 MG: 325 TABLET, FILM COATED ORAL at 08:50

## 2024-12-05 RX ADMIN — IBUPROFEN 800 MG: 800 TABLET, FILM COATED ORAL at 08:50

## 2024-12-05 RX ADMIN — IBUPROFEN 800 MG: 800 TABLET, FILM COATED ORAL at 03:00

## 2024-12-05 RX ADMIN — SENNOSIDES AND DOCUSATE SODIUM 1 TABLET: 50; 8.6 TABLET ORAL at 08:50

## 2024-12-05 RX ADMIN — OXYCODONE HYDROCHLORIDE 5 MG: 5 TABLET ORAL at 03:00

## 2024-12-05 RX ADMIN — ACETAMINOPHEN 650 MG: 325 TABLET, FILM COATED ORAL at 03:00

## 2024-12-05 RX ADMIN — IBUPROFEN 800 MG: 800 TABLET, FILM COATED ORAL at 15:05

## 2024-12-05 RX ADMIN — ACETAMINOPHEN 650 MG: 325 TABLET, FILM COATED ORAL at 14:02

## 2024-12-05 ASSESSMENT — ACTIVITIES OF DAILY LIVING (ADL)
ADLS_ACUITY_SCORE: 24
DEPENDENT_IADLS:: INDEPENDENT
ADLS_ACUITY_SCORE: 24
ADLS_ACUITY_SCORE: 24

## 2024-12-05 NOTE — PLAN OF CARE
"Pt VSS. C/s incision is WDL. Tolerating regular diet and able to ambulate independently. Pt utilizing tylenol, ibuprofen, and oxycodone for pain management. Spouse and infant at bedside.    Problem: Adult Inpatient Plan of Care  Goal: Plan of Care Review  Description: The Plan of Care Review/Shift note should be completed every shift.  The Outcome Evaluation is a brief statement about your assessment that the patient is improving, declining, or no change.  This information will be displayed automatically on your shift  note.  Outcome: Progressing  Flowsheets (Taken 12/5/2024 0338)  Plan of Care Reviewed With: patient  Overall Patient Progress: improving  Goal: Patient-Specific Goal (Individualized)  Description: You can add care plan individualizations to a care plan. Examples of Individualization might be:  \"Parent requests to be called daily at 9am for status\", \"I have a hard time hearing out of my right ear\", or \"Do not touch me to wake me up as it startles  me\".  Outcome: Progressing  Goal: Absence of Hospital-Acquired Illness or Injury  Outcome: Progressing  Intervention: Prevent Skin Injury  Recent Flowsheet Documentation  Taken 12/5/2024 0200 by Minal Villasenor RN  Body Position: position changed independently  Taken 12/4/2024 2131 by Minal Villasenor RN  Body Position: position changed independently  Intervention: Prevent Infection  Recent Flowsheet Documentation  Taken 12/5/2024 0200 by Minal Villasenor RN  Infection Prevention:   hand hygiene promoted   rest/sleep promoted  Taken 12/4/2024 2131 by Minal Villasenor RN  Infection Prevention:   hand hygiene promoted   rest/sleep promoted  Goal: Optimal Comfort and Wellbeing  Outcome: Progressing  Intervention: Monitor Pain and Promote Comfort  Recent Flowsheet Documentation  Taken 12/5/2024 0300 by Minal Villasenor RN  Pain Management Interventions: medication (see MAR)  Intervention: Provide Person-Centered Care  Recent Flowsheet Documentation  Taken " 12/5/2024 0200 by Minal Villasenor, RN  Trust Relationship/Rapport:   care explained   choices provided   questions answered   questions encouraged   thoughts/feelings acknowledged  Taken 12/4/2024 2131 by Minal Villasenor, RN  Trust Relationship/Rapport:   care explained   choices provided   questions answered   questions encouraged   thoughts/feelings acknowledged  Goal: Readiness for Transition of Care  Outcome: Progressing     Problem: Pain Acute  Goal: Optimal Pain Control and Function  Outcome: Progressing  Intervention: Develop Pain Management Plan  Recent Flowsheet Documentation  Taken 12/5/2024 0300 by Minal Villasenor, RN  Pain Management Interventions: medication (see MAR)

## 2024-12-05 NOTE — PROGRESS NOTES
December 5, 2024      SUBJECTIVE: Pt did have an episode of more severe pain again in the middle of the night, received oxycodone ~ 3 am.  +Lochia, light.  Tolerating PO.  + Flatus and also reports had a BM yesterday.  Ambulating/urinating w/o difficulty.  Denies CP/palp/SOB/LH.  Pain is worse with sitting up.  Partner's  is present to help translate.    OBJECTIVE: /73 (BP Location: Right arm, Patient Position: Sitting, Cuff Size: Adult Regular)   Pulse 82   Temp 98  F (36.7  C) (Oral)   Resp 18   Wt 76.5 kg (168 lb 10.4 oz)   LMP 03/08/2024   SpO2 100%   Breastfeeding Yes   Gen: Sleeping soundly, easily awakened. NAD, A&O x3  Abd: mildly distended but soft, tympanic.  Incision C/D/I, no active bleeding.  No erythema or discharge.  Uterus at umbilicus and mild-moderate ttp mid abdomen below the U - this area is firm, likely the hematoma.  No fluctuance.  Ext: no edema BL LEs, symmetric, no CT    Hemoglobin   Date Value Ref Range Status   12/05/2024 9.6 (L) 11.7 - 15.7 g/dL Final   12/04/2024 9.3 (L) 11.7 - 15.7 g/dL Final   07/15/2020 13.8 11.7 - 15.7 g/dL Final   12/05/2019 14.5 11.7 - 15.7 g/dL Final   ]    A/P: POD#6 s/p emergent repeat LTCS for heavy VB, readmitted on the night of 12/3 for worsening abdominal pain, found to have a 6 x 3 cm hematoma in front of the uterus.  Doing well.  Afebrile, VSS.  - post-op hematoma: pt is HDS by vitals and exam, with hgb stable/improved. She remains afebrile and WBC normal, no evidence of infection.  - ibuprofen/oxycodone/tylenol PRN pain.    - regular diet as tolerated  - breastfeeding  - encouraged ambulation this AM, wear binder.  - routine post-op care  - plan for discharge home likely later this afternoon if pain is controlled.        KARI ROSADO MD

## 2024-12-05 NOTE — PROGRESS NOTES
December 5, 2024        S: Pt doing ok.  Last dose of oxycodone 9:07 AM.  Has walked around in the room.      O: /73 (BP Location: Right arm, Patient Position: Sitting, Cuff Size: Adult Regular)   Pulse 82   Temp 98  F (36.7  C) (Oral)   Resp 18   Wt 76.5 kg (168 lb 10.4 oz)   LMP 03/08/2024   SpO2 100%   Breastfeeding Yes   Gen: NAD, A&O x 3, sitting up in bed pumping  Abd: mildly distended, soft, incision C/D/I. Mild-mod ttp mid uterus (superior to incision) that is firm likely 2/2 hematoma, no fluctuance  Ext: no CCE, no CT      A/P: 40 yo POD #6 s/p emergent repeat C/S for heavy VB, now HD #2 readmitted for post-op pain mgmt due to post-op hematoma anterior to uterus.  She remains hemodynamically stable by vitals and examination, without evidence of infection.  - plan for discharge this afternoon  - discussed with pt and her  recommend pain mgmt regimen, and importance in following a regular schedule for at least the next few days (also included in her discharge instructions)  - new Rx for oxycodone sent to pharmacy downstairs  - warned of risk of constipation, she reports passage of flatus and BM, however recommend that while taking oxycodone, she should take a stool softener 1-2x/day. Pt states she already has stool softener at home  - pt already has ibuprofen/tylenol at home  - follow-up in the office in 1 week for a post-op incision check.  - pt and partner understand and agree with the plan of care, all questions answered.      KARI ROSADO MD

## 2024-12-05 NOTE — DISCHARGE INSTRUCTIONS
You should be on pelvic rest with no heavy lifting >25 lb for 6 weeks.  Please call or return if you have fever >/= 100.4 degrees Farenheit (38.0 degrees Celsius), severe worsening abdominal/pelvic pain not relieved by oral pain medications, heavy persistent vaginal bleeding, chest pain, palpitations, shortness of breath, dizziness, depressed mood, or for any other major concern.      You may use over the counter ibuprofen (800 mg every 6 hours) and acetaminophen/tylenol (650 mg every 6 hours) as needed for pain.  You can also use oxycodone 5 mg every 6 hours.  We recommend follow-ing this schedule: Ibuprofen 800 mg, then 3 hours later tylenol 650 mg, then 3 hours later oxycodone if needed, then 3 hours later back to ibuprofen.    You should also use a stool softener (eg. Colace/Docusate or sennakot 1-2 tabs per day) for constipation.  These are safe with breastfeeding.    Please call the office to schedule a post-op incision and abdominal exam within 1 week.

## 2024-12-05 NOTE — PLAN OF CARE
"VSS on room air. Lochia/incision WDL. Voiding appropriately and ambulating independently. Pain adequately controlled with PRN medications. Patient did not need oxycodone this shift. Patient spent majority of today resting in bed. Ambulation encouraged. Significant other states \"she's been walking in the room.\" Appears comfortable.  Significant other and infant at bedside.    AVS/discharge paperwork reviewed.  Patient indicates understanding discharge instructions. Questions answered, concerns addressed, resources provided. Verbalizes when to return to clinic for follow up. Prescriptions reviewed and delivered to patient. Patient ambulated well off unit with staff escort with AVS/discharge paperwork, prescriptions, and personal belongings.     Problem: Adult Inpatient Plan of Care  Goal: Plan of Care Review  Description: The Plan of Care Review/Shift note should be completed every shift.  The Outcome Evaluation is a brief statement about your assessment that the patient is improving, declining, or no change.  This information will be displayed automatically on your shift  note.  12/5/2024 1345 by Vivienne Lindsey, RN  Outcome: Met  Flowsheets (Taken 12/5/2024 1345)  Plan of Care Reviewed With:   patient   spouse  Overall Patient Progress: improving  12/5/2024 1041 by Vivienne Lindsey, RN  Outcome: Progressing  Flowsheets (Taken 12/5/2024 1041)  Plan of Care Reviewed With:   patient   spouse  Overall Patient Progress: improving  12/5/2024 0902 by Vivienne Lindsey, RN  Outcome: Progressing  Flowsheets (Taken 12/5/2024 0902)  Plan of Care Reviewed With: patient  Overall Patient Progress: improving  Goal: Patient-Specific Goal (Individualized)  Description: You can add care plan individualizations to a care plan. Examples of Individualization might be:  \"Parent requests to be called daily at 9am for status\", \"I have a hard time hearing out of my right ear\", or \"Do not touch me to wake me up as it " "startles  me\".  12/5/2024 1345 by Vivienne Lindsey RN  Outcome: Met  12/5/2024 1041 by Vivienne Lindsey RN  Outcome: Progressing  12/5/2024 0902 by Vivienne Lindsey RN  Outcome: Progressing  Goal: Absence of Hospital-Acquired Illness or Injury  12/5/2024 1345 by Vivienne Lindsey, RN  Outcome: Met  12/5/2024 1041 by Vivienne Lindsey RN  Outcome: Progressing  12/5/2024 0902 by Vivienne Lindsey RN  Outcome: Progressing  Intervention: Prevent Skin Injury  Recent Flowsheet Documentation  Taken 12/5/2024 0900 by Vivienne Lindsey RN  Body Position: position changed independently  Intervention: Prevent Infection  Recent Flowsheet Documentation  Taken 12/5/2024 0900 by Vivienne Lindsey RN  Infection Prevention:   hand hygiene promoted   rest/sleep promoted  Goal: Optimal Comfort and Wellbeing  12/5/2024 1345 by Vivienne Lindsey RN  Outcome: Met  12/5/2024 1041 by Vivienne Lindsey RN  Outcome: Progressing  12/5/2024 0902 by Vivienne Lindsey RN  Outcome: Progressing  Intervention: Monitor Pain and Promote Comfort  Recent Flowsheet Documentation  Taken 12/5/2024 0850 by Vivienne Lindsey RN  Pain Management Interventions: medication (see MAR)  Intervention: Provide Person-Centered Care  Recent Flowsheet Documentation  Taken 12/5/2024 0900 by Vivienne Lindsey RN  Trust Relationship/Rapport:   care explained   choices provided   questions answered   questions encouraged   thoughts/feelings acknowledged   emotional support provided   empathic listening provided   reassurance provided  Goal: Readiness for Transition of Care  12/5/2024 1345 by Vivienne Lindsey RN  Outcome: Met  12/5/2024 1041 by Vivienne Lindsey RN  Outcome: Progressing  12/5/2024 0902 by Vivienne Lindsey RN  Outcome: Progressing     Problem: Pain Acute  Goal: Optimal Pain Control and Function  12/5/2024 1345 by Vivienne Lindsey, RN  Outcome: Met  12/5/2024 1041 by Vivienne Lindsey, RN  Outcome: Progressing  12/5/2024 0902 by Rosalia" Vivienne BURROWS, RN  Outcome: Progressing  Intervention: Develop Pain Management Plan  Recent Flowsheet Documentation  Taken 12/5/2024 0850 by Vivienne Lindsey, RN  Pain Management Interventions: medication (see MAR)   Goal Outcome Evaluation:      Plan of Care Reviewed With: patient, spouse    Overall Patient Progress: improvingOverall Patient Progress: improving

## 2025-03-22 ENCOUNTER — HOSPITAL ENCOUNTER (EMERGENCY)
Facility: CLINIC | Age: 40
Discharge: HOME | End: 2025-03-22
Attending: EMERGENCY MEDICINE | Admitting: EMERGENCY MEDICINE
Payer: COMMERCIAL

## 2025-03-22 ENCOUNTER — APPOINTMENT (OUTPATIENT)
Dept: ULTRASOUND IMAGING | Facility: CLINIC | Age: 40
End: 2025-03-22
Attending: EMERGENCY MEDICINE
Payer: COMMERCIAL

## 2025-03-22 ENCOUNTER — APPOINTMENT (OUTPATIENT)
Dept: CT IMAGING | Facility: CLINIC | Age: 40
End: 2025-03-22
Attending: EMERGENCY MEDICINE
Payer: COMMERCIAL

## 2025-03-22 VITALS
WEIGHT: 145 LBS | DIASTOLIC BLOOD PRESSURE: 69 MMHG | TEMPERATURE: 97 F | HEIGHT: 60 IN | HEART RATE: 100 BPM | BODY MASS INDEX: 28.47 KG/M2 | RESPIRATION RATE: 20 BRPM | OXYGEN SATURATION: 98 % | SYSTOLIC BLOOD PRESSURE: 110 MMHG

## 2025-03-22 DIAGNOSIS — R10.9 ABDOMINAL PAIN, UNSPECIFIED ABDOMINAL LOCATION: ICD-10-CM

## 2025-03-22 DIAGNOSIS — R11.2 NAUSEA AND VOMITING, UNSPECIFIED VOMITING TYPE: ICD-10-CM

## 2025-03-22 LAB
ALBUMIN SERPL BCG-MCNC: 5.1 G/DL (ref 3.5–5.2)
ALP SERPL-CCNC: 156 U/L (ref 40–150)
ALT SERPL W P-5'-P-CCNC: 17 U/L (ref 0–50)
ANION GAP SERPL CALCULATED.3IONS-SCNC: 12 MMOL/L (ref 7–15)
AST SERPL W P-5'-P-CCNC: 24 U/L (ref 0–45)
BASOPHILS # BLD AUTO: 0 10E3/UL (ref 0–0.2)
BASOPHILS NFR BLD AUTO: 0 %
BILIRUB SERPL-MCNC: 0.2 MG/DL
BUN SERPL-MCNC: 14.6 MG/DL (ref 6–20)
CALCIUM SERPL-MCNC: 10.1 MG/DL (ref 8.8–10.4)
CHLORIDE SERPL-SCNC: 99 MMOL/L (ref 98–107)
CREAT SERPL-MCNC: 0.62 MG/DL (ref 0.51–0.95)
EGFRCR SERPLBLD CKD-EPI 2021: >90 ML/MIN/1.73M2
EOSINOPHIL # BLD AUTO: 0.5 10E3/UL (ref 0–0.7)
EOSINOPHIL NFR BLD AUTO: 8 %
ERYTHROCYTE [DISTWIDTH] IN BLOOD BY AUTOMATED COUNT: 13.9 % (ref 10–15)
GLUCOSE SERPL-MCNC: 98 MG/DL (ref 70–99)
HCG SERPL QL: NEGATIVE
HCO3 SERPL-SCNC: 28 MMOL/L (ref 22–29)
HCT VFR BLD AUTO: 44.9 % (ref 35–47)
HGB BLD-MCNC: 14.6 G/DL (ref 11.7–15.7)
HOLD SPECIMEN: NORMAL
IMM GRANULOCYTES # BLD: 0 10E3/UL
IMM GRANULOCYTES NFR BLD: 0 %
LIPASE SERPL-CCNC: 25 U/L (ref 13–60)
LYMPHOCYTES # BLD AUTO: 3.1 10E3/UL (ref 0.8–5.3)
LYMPHOCYTES NFR BLD AUTO: 51 %
MCH RBC QN AUTO: 28.1 PG (ref 26.5–33)
MCHC RBC AUTO-ENTMCNC: 32.5 G/DL (ref 31.5–36.5)
MCV RBC AUTO: 86 FL (ref 78–100)
MONOCYTES # BLD AUTO: 0.3 10E3/UL (ref 0–1.3)
MONOCYTES NFR BLD AUTO: 5 %
NEUTROPHILS # BLD AUTO: 2.2 10E3/UL (ref 1.6–8.3)
NEUTROPHILS NFR BLD AUTO: 36 %
NRBC # BLD AUTO: 0 10E3/UL
NRBC BLD AUTO-RTO: 0 /100
PLATELET # BLD AUTO: 242 10E3/UL (ref 150–450)
POTASSIUM SERPL-SCNC: 3.7 MMOL/L (ref 3.4–5.3)
PROT SERPL-MCNC: 8.6 G/DL (ref 6.4–8.3)
RBC # BLD AUTO: 5.2 10E6/UL (ref 3.8–5.2)
SODIUM SERPL-SCNC: 139 MMOL/L (ref 135–145)
WBC # BLD AUTO: 6.1 10E3/UL (ref 4–11)

## 2025-03-22 PROCEDURE — 250N000011 HC RX IP 250 OP 636: Mod: JZ | Performed by: EMERGENCY MEDICINE

## 2025-03-22 PROCEDURE — 250N000013 HC RX MED GY IP 250 OP 250 PS 637: Performed by: EMERGENCY MEDICINE

## 2025-03-22 PROCEDURE — 83690 ASSAY OF LIPASE: CPT | Performed by: EMERGENCY MEDICINE

## 2025-03-22 PROCEDURE — 96374 THER/PROPH/DIAG INJ IV PUSH: CPT | Mod: 59

## 2025-03-22 PROCEDURE — 250N000011 HC RX IP 250 OP 636: Performed by: EMERGENCY MEDICINE

## 2025-03-22 PROCEDURE — 99285 EMERGENCY DEPT VISIT HI MDM: CPT | Mod: 25

## 2025-03-22 PROCEDURE — 80053 COMPREHEN METABOLIC PANEL: CPT | Performed by: EMERGENCY MEDICINE

## 2025-03-22 PROCEDURE — 74177 CT ABD & PELVIS W/CONTRAST: CPT

## 2025-03-22 PROCEDURE — 76705 ECHO EXAM OF ABDOMEN: CPT

## 2025-03-22 PROCEDURE — 84703 CHORIONIC GONADOTROPIN ASSAY: CPT | Performed by: EMERGENCY MEDICINE

## 2025-03-22 PROCEDURE — 258N000003 HC RX IP 258 OP 636: Performed by: EMERGENCY MEDICINE

## 2025-03-22 PROCEDURE — 85025 COMPLETE CBC W/AUTO DIFF WBC: CPT | Performed by: EMERGENCY MEDICINE

## 2025-03-22 PROCEDURE — 36415 COLL VENOUS BLD VENIPUNCTURE: CPT | Performed by: EMERGENCY MEDICINE

## 2025-03-22 PROCEDURE — 96375 TX/PRO/DX INJ NEW DRUG ADDON: CPT

## 2025-03-22 PROCEDURE — 96361 HYDRATE IV INFUSION ADD-ON: CPT

## 2025-03-22 RX ORDER — KETOROLAC TROMETHAMINE 15 MG/ML
15 INJECTION, SOLUTION INTRAMUSCULAR; INTRAVENOUS ONCE
Status: COMPLETED | OUTPATIENT
Start: 2025-03-22 | End: 2025-03-22

## 2025-03-22 RX ORDER — IOPAMIDOL 755 MG/ML
73 INJECTION, SOLUTION INTRAVASCULAR ONCE
Status: COMPLETED | OUTPATIENT
Start: 2025-03-22 | End: 2025-03-22

## 2025-03-22 RX ORDER — ONDANSETRON 2 MG/ML
4 INJECTION INTRAMUSCULAR; INTRAVENOUS ONCE
Status: COMPLETED | OUTPATIENT
Start: 2025-03-22 | End: 2025-03-22

## 2025-03-22 RX ORDER — FAMOTIDINE 20 MG/1
20 TABLET, FILM COATED ORAL 2 TIMES DAILY
Qty: 7 TABLET | Refills: 0 | Status: SHIPPED | OUTPATIENT
Start: 2025-03-22

## 2025-03-22 RX ORDER — OXYCODONE HYDROCHLORIDE 5 MG/1
5 TABLET ORAL ONCE
Status: COMPLETED | OUTPATIENT
Start: 2025-03-22 | End: 2025-03-22

## 2025-03-22 RX ORDER — METOCLOPRAMIDE HYDROCHLORIDE 5 MG/ML
5 INJECTION INTRAMUSCULAR; INTRAVENOUS ONCE
Status: COMPLETED | OUTPATIENT
Start: 2025-03-22 | End: 2025-03-22

## 2025-03-22 RX ORDER — HYDROMORPHONE HYDROCHLORIDE 1 MG/ML
0.5 INJECTION, SOLUTION INTRAMUSCULAR; INTRAVENOUS; SUBCUTANEOUS ONCE
Status: COMPLETED | OUTPATIENT
Start: 2025-03-22 | End: 2025-03-22

## 2025-03-22 RX ORDER — ACETAMINOPHEN 500 MG
1000 TABLET ORAL ONCE
Status: COMPLETED | OUTPATIENT
Start: 2025-03-22 | End: 2025-03-22

## 2025-03-22 RX ORDER — ONDANSETRON 4 MG/1
4 TABLET, ORALLY DISINTEGRATING ORAL EVERY 8 HOURS PRN
Qty: 12 TABLET | Refills: 0 | Status: SHIPPED | OUTPATIENT
Start: 2025-03-22

## 2025-03-22 RX ORDER — DIPHENHYDRAMINE HYDROCHLORIDE 50 MG/ML
25 INJECTION, SOLUTION INTRAMUSCULAR; INTRAVENOUS ONCE
Status: COMPLETED | OUTPATIENT
Start: 2025-03-22 | End: 2025-03-22

## 2025-03-22 RX ADMIN — OXYCODONE HYDROCHLORIDE 5 MG: 5 TABLET ORAL at 07:46

## 2025-03-22 RX ADMIN — SODIUM CHLORIDE, POTASSIUM CHLORIDE, SODIUM LACTATE AND CALCIUM CHLORIDE 1000 ML: 600; 310; 30; 20 INJECTION, SOLUTION INTRAVENOUS at 06:02

## 2025-03-22 RX ADMIN — ACETAMINOPHEN 1000 MG: 500 TABLET ORAL at 07:46

## 2025-03-22 RX ADMIN — DIPHENHYDRAMINE HYDROCHLORIDE 25 MG: 50 INJECTION, SOLUTION INTRAMUSCULAR; INTRAVENOUS at 07:52

## 2025-03-22 RX ADMIN — IOPAMIDOL 73 ML: 755 INJECTION, SOLUTION INTRAVENOUS at 07:32

## 2025-03-22 RX ADMIN — ONDANSETRON 4 MG: 2 INJECTION INTRAMUSCULAR; INTRAVENOUS at 05:44

## 2025-03-22 RX ADMIN — METOCLOPRAMIDE 5 MG: 5 INJECTION, SOLUTION INTRAMUSCULAR; INTRAVENOUS at 07:52

## 2025-03-22 RX ADMIN — HYDROMORPHONE HYDROCHLORIDE 0.5 MG: 1 INJECTION, SOLUTION INTRAMUSCULAR; INTRAVENOUS; SUBCUTANEOUS at 06:01

## 2025-03-22 RX ADMIN — KETOROLAC TROMETHAMINE 15 MG: 15 INJECTION, SOLUTION INTRAMUSCULAR; INTRAVENOUS at 06:01

## 2025-03-22 ASSESSMENT — ACTIVITIES OF DAILY LIVING (ADL)
ADLS_ACUITY_SCORE: 50

## 2025-03-22 NOTE — ED TRIAGE NOTES
"Family reports pt ate some \"sweets\" at midnight then woke up 20 min ago with severe abd pain and vomiting.         "

## 2025-03-22 NOTE — ED PROVIDER NOTES
Emergency Department Note      History of Present Illness     Chief Complaint   Abdominal Pain      HPI   Lorna Kenyon is a 39 year old female      in December.  Acute onset of abdominal pain associated with nausea and nonbilious nonbloody emesis shortly before arrival.  She did have coffee and a pastry around midnight last.  Nobody else at home is sick.  No fevers.  No dysuria frequency or urgency.  No sore throat or cough.  Describes the pain as sharp cramping    Independent Historian   Family present who contributes to the history.    Review of External Notes   Surgical history includes cholecystectomy several years ago.    Past Medical History     Medical History and Problem List   GERD  Cardiac murmur   Ovarian cyst, complex     Medications   Prilosec   Colace     Surgical History    section   Cholecystectomy     Physical Exam     Patient Vitals for the past 24 hrs:   BP Temp Temp src Pulse Resp SpO2 Height Weight   25 0753 132/86 -- -- 97 -- -- -- --   25 0606 -- -- -- -- -- 97 % -- --   25 0600 (!) 122/103 -- -- 92 -- 99 % -- --   25 0509 (!) 153/86 97  F (36.1  C) Temporal 91 20 100 % 1.524 m (5') 65.8 kg (145 lb)     Physical Exam    HENT:  mmm  Eyes: periorbital tissue and sclera normal  Neck: supple  CV: ppi, regular   Resp: speaking in full sentences without any resp distress  Abd: Mild generalized tenderness palpation tenderness to palpation, no distention, no rigidity  Ext: peripheral edema present:    Skin: warm dry well perfused  Neuro: Alert, moving all extremities without apparent deficit,  gait stable      Diagnostics     Lab Results   Labs Ordered and Resulted from Time of ED Arrival to Time of ED Departure   COMPREHENSIVE METABOLIC PANEL - Abnormal       Result Value    Sodium 139      Potassium 3.7      Carbon Dioxide (CO2) 28      Anion Gap 12      Urea Nitrogen 14.6      Creatinine 0.62      GFR Estimate >90      Calcium 10.1      Chloride 99       Glucose 98      Alkaline Phosphatase 156 (*)     AST 24      ALT 17      Protein Total 8.6 (*)     Albumin 5.1      Bilirubin Total 0.2     HCG QUALITATIVE PREGNANCY - Normal    hCG Serum Qualitative Negative     LIPASE - Normal    Lipase 25     CBC WITH PLATELETS AND DIFFERENTIAL    WBC Count 6.1      RBC Count 5.20      Hemoglobin 14.6      Hematocrit 44.9      MCV 86      MCH 28.1      MCHC 32.5      RDW 13.9      Platelet Count 242      % Neutrophils 36      % Lymphocytes 51      % Monocytes 5      % Eosinophils 8      % Basophils 0      % Immature Granulocytes 0      NRBCs per 100 WBC 0      Absolute Neutrophils 2.2      Absolute Lymphocytes 3.1      Absolute Monocytes 0.3      Absolute Eosinophils 0.5      Absolute Basophils 0.0      Absolute Immature Granulocytes 0.0      Absolute NRBCs 0.0     INFLUENZA A/B, RSV AND SARS-COV2 PCR       Imaging   Abd/pelvis CT,  IV  contrast only TRAUMA / AAA   Final Result   IMPRESSION:    1.  Postoperative changes status post  with a fluid collection along the  scar which overall appears decreased and improved when compared to prior examination. No acute findings in the abdomen and pelvis.      US Abdomen Limited   Final Result   IMPRESSION:   1.  Prior cholecystectomy.   2.  No biliary dilatation.          EKG   None     Independent Interpretation       ED Course      Medications Administered   Medications   ondansetron (ZOFRAN) injection 4 mg (4 mg Intravenous $Given 3/22/25 0544)   lactated ringers BOLUS 1,000 mL (0 mLs Intravenous Stopped 3/22/25 0825)   HYDROmorphone (PF) (DILAUDID) injection 0.5 mg (0.5 mg Intravenous $Given 3/22/25 0601)   ketorolac (TORADOL) injection 15 mg (15 mg Intravenous $Given 3/22/25 0601)   acetaminophen (TYLENOL) tablet 1,000 mg (1,000 mg Oral $Given 3/22/25 0746)   oxyCODONE (ROXICODONE) tablet 5 mg (5 mg Oral $Given 3/22/25 0746)   metoclopramide (REGLAN) injection 5 mg (5 mg Intravenous $Given 3/22/25 0752)    diphenhydrAMINE (BENADRYL) injection 25 mg (25 mg Intravenous $Given 3/22/25 0752)   iopamidol (ISOVUE-370) solution 73 mL (73 mLs Intravenous $Given 3/22/25 0732)   sodium chloride (PF) 0.9% PF flush 58 mL (58 mLs Intravenous $Given 3/22/25 0733)       Procedures   Procedures     Discussion of Management   None    ED Course   ED Course as of 03/22/25 0830   Sat Mar 22, 2025   0604 I initially assessed the patient and obtained the above history and physical exam.    0819 I rechecked the patient        Additional Documentation  None    Medical Decision Making / Diagnosis     CMS Diagnoses: None    MIPS       None    MDM   Lorna Kenyon is a 39 year old female     Presenting with acute onset of abdominal pain and nonbilious nonbloody emesis.  Workup here fortunately is negative for an acute intra-abdominal surgical vascular or infectious emergency.  We see postoperative changes in the uterus on the CT scan.  Blood tests are unremarkable.    Symptoms improved with therapies here in the emergency department and she was able to pass a p.o. challenge.  Discussed the possibilities of stomach acid related etiology peptic ulcer disease esophageal spasm intestinal spasm etc.  Risk ratified low enough for safe discharge home.  Her family are comfortable and agreeable with that plan.    Disposition   The patient was discharged.     Diagnosis     ICD-10-CM    1. Abdominal pain, unspecified abdominal location  R10.9       2. Nausea and vomiting, unspecified vomiting type  R11.2            Discharge Medications   New Prescriptions    FAMOTIDINE (PEPCID) 20 MG TABLET    Take 1 tablet (20 mg) by mouth 2 times daily.    ONDANSETRON (ZOFRAN ODT) 4 MG ODT TAB    Take 1 tablet (4 mg) by mouth every 8 hours as needed for nausea.     Scribe Disclosure:  Ghassan HERNANDEZ, am serving as a scribe at 6:16 AM on 3/22/2025 to document services personally performed by Donn Houston MD based on my observations and the  provider's statements to me.        Donn Houston MD  03/22/25 [id number]

## 2025-07-13 ENCOUNTER — HOSPITAL ENCOUNTER (EMERGENCY)
Facility: CLINIC | Age: 40
Discharge: HOME | End: 2025-07-13
Attending: STUDENT IN AN ORGANIZED HEALTH CARE EDUCATION/TRAINING PROGRAM
Payer: COMMERCIAL

## 2025-07-13 ENCOUNTER — APPOINTMENT (OUTPATIENT)
Dept: GENERAL RADIOLOGY | Facility: CLINIC | Age: 40
End: 2025-07-13
Attending: STUDENT IN AN ORGANIZED HEALTH CARE EDUCATION/TRAINING PROGRAM
Payer: COMMERCIAL

## 2025-07-13 VITALS
SYSTOLIC BLOOD PRESSURE: 112 MMHG | TEMPERATURE: 97.3 F | RESPIRATION RATE: 18 BRPM | OXYGEN SATURATION: 98 % | HEART RATE: 72 BPM | DIASTOLIC BLOOD PRESSURE: 88 MMHG

## 2025-07-13 DIAGNOSIS — M79.671 FOOT PAIN, BILATERAL: ICD-10-CM

## 2025-07-13 DIAGNOSIS — M79.672 FOOT PAIN, BILATERAL: ICD-10-CM
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
## 2025-07-13 DIAGNOSIS — S80.01XA CONTUSION OF RIGHT KNEE, INITIAL ENCOUNTER: ICD-10-CM

## 2025-07-13 DIAGNOSIS — S80.819A: ICD-10-CM

## 2025-07-13 PROCEDURE — 73630 X-RAY EXAM OF FOOT: CPT | Mod: LT

## 2025-07-13 PROCEDURE — 99284 EMERGENCY DEPT VISIT MOD MDM: CPT | Performed by: STUDENT IN AN ORGANIZED HEALTH CARE EDUCATION/TRAINING PROGRAM

## 2025-07-13 PROCEDURE — 73562 X-RAY EXAM OF KNEE 3: CPT | Mod: RT

## 2025-07-13 PROCEDURE — 73610 X-RAY EXAM OF ANKLE: CPT | Mod: RT

## 2025-07-13 PROCEDURE — 250N000013 HC RX MED GY IP 250 OP 250 PS 637: Performed by: STUDENT IN AN ORGANIZED HEALTH CARE EDUCATION/TRAINING PROGRAM

## 2025-07-13 RX ORDER — ACETAMINOPHEN 500 MG
1000 TABLET ORAL ONCE
Status: COMPLETED | OUTPATIENT
Start: 2025-07-13 | End: 2025-07-13

## 2025-07-13 RX ADMIN — ACETAMINOPHEN 1000 MG: 500 TABLET, FILM COATED ORAL at 12:56

## 2025-07-13 ASSESSMENT — COLUMBIA-SUICIDE SEVERITY RATING SCALE - C-SSRS
2. HAVE YOU ACTUALLY HAD ANY THOUGHTS OF KILLING YOURSELF IN THE PAST MONTH?: NO
6. HAVE YOU EVER DONE ANYTHING, STARTED TO DO ANYTHING, OR PREPARED TO DO ANYTHING TO END YOUR LIFE?: NO
1. IN THE PAST MONTH, HAVE YOU WISHED YOU WERE DEAD OR WISHED YOU COULD GO TO SLEEP AND NOT WAKE UP?: NO

## 2025-07-13 ASSESSMENT — ACTIVITIES OF DAILY LIVING (ADL)
ADLS_ACUITY_SCORE: 50
ADLS_ACUITY_SCORE: 50

## 2025-07-13 NOTE — ED TRIAGE NOTES
Pt complains of fall while bringing her child into the ED. Pt states fall was mechanical from tripping. Pt complains of bilateral ankle and feet pain. Abrasions on bilateral feet and ankles. Pt states she is able to bear weight. Pt also complains of R knee pain.

## 2025-07-13 NOTE — DISCHARGE INSTRUCTIONS
You can use tylenol and ibuprofen as needed for pain:  Take 600 mg of ibuprofen every 6-8 hours for pain.  Do not exceed 2400 mg in a 24-hour period. Do not take this on an empty stomach  Take 1000 mg of Tylenol every ~6 hours for pain.  Do not exceed 4000 mg in 24 hours.  Leave bandage in place for 12-24 hours  After this you may gently clean the wound once daily with soap/water, pat dry, and cover with ointment (Bacitracin, Vaseline, Aquaphor, Antibiotic) and a new bandage. Repeat this daily and remember moist wounds heal best!  Return to the ED should you develop redness around the wound, streaking down the foot, fevers, chills, purulent wound drainage, or any further concerns.  Hope you feel better soon!

## 2025-07-13 NOTE — ED PROVIDER NOTES
Emergency Department Note      History of Present Illness     Chief Complaint   Fall    A [place] interpretor was used during this encounter.     HPI   Lorna Kenyon is a 40 year old female who presents for evaluation after a fall. The patient reports that she injured her right knee, right ankle, and left foot after falling outside of the emergency department today. She was coming in to have her son evaluated. She was not holding him when she tripped. She did not hit her head. She denies any injury to her arms. She has not taken any medication to manage the pain. Her tetanus vaccination is up to date.     Independent Historian   None    Review of External Notes   I reviewed hospital follow-up from 2025    Past Medical History     Medical History and Problem List   Gestational diabetes  Postpartum hematoma   Allergic rhinitis  GERD  Cardiac murmur     Medications   Pantoprazole    Surgical History    section x3  Cholecystectomy    Physical Exam     Patient Vitals for the past 24 hrs:   BP Temp Temp src Pulse Resp SpO2   25 1008 117/81 97.3  F (36.3  C) Temporal 75 17 97 %     Physical Exam  Vital signs and nursing notes reviewed.    General:  Patient in no acute distress. Sitting on bed   Head:  No obvious trauma to head. Negative anand's sign and negative raccoon eyes bilaterally.  Ears: External ears normal. No hemotympanum bilaterally.  Nose:  No deformity to nose. No epistaxis.   Eyes:  Conjunctivae and EOM are normal. Pupils are equal, round, and reactive.   Neck: Normal range of motion. Neck supple. No tracheal deviation present. No midline cervical neck tenderness, deformity, step off or pain in the midline with ROM.  CV:  Normal heart sounds.  2+ dorsalis pedal pulses bilaterally  Pulm/Chest: Breath sounds clear and equal. Effort normal.   Abd: Soft and non distended. There is no tenderness.  M/S: Normal range of motion. No pain to palpation or step off to thoracic and lumbar  spine.  Right lower extremity: Pelvis normal. Abrasion and contusion to right anterior knee with tenderness to palpation, no joint effusion.  Normal range of motion.  Patellar tendon intact.  Lateral ankle with superficial abrasion, no active bleeding and associated tenderness to palpation.  Foot is warm well-perfused with normal sensation, strength, and range of motion.  Brisk capillary refill to all digits  Left lower extremity: Pelvis and knee normal. Tib-fib without tenderness to palpation or bony deformity.  Superficial abrasion to medial aspect of first MTP joint without significant swelling or ecchymosis.  No active bleeding.  Neuro: Alert. CN II-XII Grossly intact. GCS 15.  Skin: Skin is warm and dry to touch.   Psych: Normal mood and affect. Behavior is normal.      Diagnostics     Lab Results   Labs Ordered and Resulted from Time of ED Arrival to Time of ED Departure - No data to display    Imaging   XR Knee Right 3 Views   Final Result   IMPRESSION: Normal joint spaces and alignment. No fracture or joint effusion.      Foot XR, G/E 3 views, left   Final Result   IMPRESSION: Normal joint spaces and alignment. No fracture. No soft tissue swelling around the ankle.      Ankle XR, G/E 3 views, right   Final Result   IMPRESSION: Normal joint spaces and alignment. No fracture. No soft tissue swelling around the ankle.        Independent Interpretation   I reviewed the right knee x-ray and appreciate no acute fracture  I reviewed the right ankle x-ray and appreciate no acute fracture  I reviewed the left foot x-ray and appreciate no acute fracture    ED Course      Medications Administered   Medications   acetaminophen (TYLENOL) tablet 1,000 mg (1,000 mg Oral $Given 7/13/25 1256)       Procedures   Procedures     Discussion of Management   None    ED Course   ED Course as of 07/13/25 1219   Sun Jul 13, 2025   1218 I initially assessed the patient and obtained the above history and physical exam.         Additional Documentation  None    Medical Decision Making / Diagnosis     CMS Diagnoses: None    MIPS   None               MDM   Lorna Kenyon is a 40 year old female who presents to the emergency department for evaluation after mechanical fall intracranial her feet while walking into the emergency department to have her son evaluated.  See HPI.  Vitals reassuring.  On exam, she is superficial abrasions to her right knee, right ankle, and left foot.  She does have some tenderness to palpation in all of these areas with mild knee swelling, x-rays were obtained and are fortunately negative for underlying fracture or other bony abnormality.  Both extremities have normal strength, sensation, and range of motion in all joints.  Distal pulses are intact with warm and well-perfused digits.  She is able to ambulate without difficulty.  Wounds were cleaned and dressed with antibiotic dressings.  Her tetanus is up-to-date.  Using reasonable clinical judgment, I feel the patient is safe for discharge home.  She had no head strike, there is no evidence of intra-abdominal or intrathoracic injury on head to toe trauma exam.  Return precautions reviewed.  Patient agreeable to plan and had questions answered    Disposition   The patient was discharged.     Diagnosis     ICD-10-CM    1. Fall, initial encounter  W19.XXXA       2. Contusion of right knee, initial encounter  S80.01XA       3. Foot pain, bilateral  M79.671     M79.672       4. Abrasion of multiple sites of lower extremity, unspecified laterality, initial encounter  S80.819A          Discharge Medications   New Prescriptions    No medications on file     Scribe Disclosure:  Nusrat HERNANDEZ, am serving as a scribe at 10:20 AM on 7/13/2025 to document services personally performed by Hilda Kenney PA-C based on my observations and the provider's statements to me.     Hilda Kenney PA-C on 7/13/2025 at 5:27 PM       Hilda Kenney  MATI  07/13/25 [id number]

## 2025-08-15 ENCOUNTER — HOSPITAL ENCOUNTER (EMERGENCY)
Facility: CLINIC | Age: 40
Discharge: HOME | End: 2025-08-15
Attending: EMERGENCY MEDICINE | Admitting: EMERGENCY MEDICINE
Payer: COMMERCIAL

## 2025-08-15 VITALS
SYSTOLIC BLOOD PRESSURE: 103 MMHG | DIASTOLIC BLOOD PRESSURE: 63 MMHG | WEIGHT: 171.08 LBS | BODY MASS INDEX: 33.41 KG/M2 | HEART RATE: 83 BPM | RESPIRATION RATE: 22 BRPM | TEMPERATURE: 96.8 F | OXYGEN SATURATION: 100 %

## 2025-08-15 DIAGNOSIS — R11.2 NAUSEA AND VOMITING, UNSPECIFIED VOMITING TYPE: ICD-10-CM

## 2025-08-15 DIAGNOSIS — R10.84 GENERALIZED ABDOMINAL PAIN: Primary | ICD-10-CM

## 2025-08-15 LAB
ALBUMIN SERPL BCG-MCNC: 4.4 G/DL (ref 3.5–5.2)
ALBUMIN UR-MCNC: NEGATIVE MG/DL
ALP SERPL-CCNC: 109 U/L (ref 40–150)
ALT SERPL W P-5'-P-CCNC: 12 U/L (ref 0–50)
ANION GAP SERPL CALCULATED.3IONS-SCNC: 9 MMOL/L (ref 7–15)
APPEARANCE UR: CLEAR
AST SERPL W P-5'-P-CCNC: 17 U/L (ref 0–45)
BASOPHILS # BLD AUTO: <0.03 10E3/UL (ref 0–0.2)
BASOPHILS NFR BLD AUTO: 0.5 %
BILIRUB SERPL-MCNC: 0.2 MG/DL
BILIRUB UR QL STRIP: NEGATIVE
BUN SERPL-MCNC: 11.9 MG/DL (ref 6–20)
CALCIUM SERPL-MCNC: 9.4 MG/DL (ref 8.8–10.4)
CHLORIDE SERPL-SCNC: 103 MMOL/L (ref 98–107)
COLOR UR AUTO: ABNORMAL
CREAT SERPL-MCNC: 0.58 MG/DL (ref 0.51–0.95)
EGFRCR SERPLBLD CKD-EPI 2021: >90 ML/MIN/1.73M2
EOSINOPHIL # BLD AUTO: 0.22 10E3/UL (ref 0–0.7)
EOSINOPHIL NFR BLD AUTO: 5.6 %
ERYTHROCYTE [DISTWIDTH] IN BLOOD BY AUTOMATED COUNT: 12.2 % (ref 10–15)
GLUCOSE SERPL-MCNC: 109 MG/DL (ref 70–99)
GLUCOSE UR STRIP-MCNC: NEGATIVE MG/DL
HCG SERPL QL: NEGATIVE
HCO3 SERPL-SCNC: 28 MMOL/L (ref 22–29)
HCT VFR BLD AUTO: 39.5 % (ref 35–47)
HGB BLD-MCNC: 13.4 G/DL (ref 11.7–15.7)
HGB UR QL STRIP: NEGATIVE
HOLD SPECIMEN: NORMAL
HOLD SPECIMEN: NORMAL
IMM GRANULOCYTES # BLD: <0.03 10E3/UL
IMM GRANULOCYTES NFR BLD: 0 %
KETONES UR STRIP-MCNC: NEGATIVE MG/DL
LEUKOCYTE ESTERASE UR QL STRIP: NEGATIVE
LIPASE SERPL-CCNC: 26 U/L (ref 13–60)
LYMPHOCYTES # BLD AUTO: 2.04 10E3/UL (ref 0.8–5.3)
LYMPHOCYTES NFR BLD AUTO: 52.2 %
MCH RBC QN AUTO: 30 PG (ref 26.5–33)
MCHC RBC AUTO-ENTMCNC: 33.9 G/DL (ref 31.5–36.5)
MCV RBC AUTO: 88.4 FL (ref 78–100)
MONOCYTES # BLD AUTO: 0.19 10E3/UL (ref 0–1.3)
MONOCYTES NFR BLD AUTO: 4.9 %
MUCOUS THREADS #/AREA URNS LPF: PRESENT /LPF
NEUTROPHILS # BLD AUTO: 1.44 10E3/UL (ref 1.6–8.3)
NEUTROPHILS NFR BLD AUTO: 36.8 %
NITRATE UR QL: NEGATIVE
NRBC # BLD AUTO: <0.03 10E3/UL
NRBC BLD AUTO-RTO: 0 /100
PH UR STRIP: 5.5 [PH] (ref 5–7)
PLATELET # BLD AUTO: 242 10E3/UL (ref 150–450)
POTASSIUM SERPL-SCNC: 4.1 MMOL/L (ref 3.4–5.3)
PROT SERPL-MCNC: 7.2 G/DL (ref 6.4–8.3)
RBC # BLD AUTO: 4.47 10E6/UL (ref 3.8–5.2)
RBC URINE: 2 /HPF
SODIUM SERPL-SCNC: 140 MMOL/L (ref 135–145)
SP GR UR STRIP: 1.01 (ref 1–1.03)
UROBILINOGEN UR STRIP-MCNC: NORMAL MG/DL
WBC # BLD AUTO: 3.91 10E3/UL (ref 4–11)
WBC URINE: <1 /HPF

## 2025-08-15 PROCEDURE — 250N000011 HC RX IP 250 OP 636: Performed by: EMERGENCY MEDICINE

## 2025-08-15 PROCEDURE — 81001 URINALYSIS AUTO W/SCOPE: CPT | Performed by: EMERGENCY MEDICINE

## 2025-08-15 PROCEDURE — 36415 COLL VENOUS BLD VENIPUNCTURE: CPT | Performed by: EMERGENCY MEDICINE

## 2025-08-15 PROCEDURE — 99284 EMERGENCY DEPT VISIT MOD MDM: CPT | Mod: 25 | Performed by: EMERGENCY MEDICINE

## 2025-08-15 PROCEDURE — 82310 ASSAY OF CALCIUM: CPT | Performed by: EMERGENCY MEDICINE

## 2025-08-15 PROCEDURE — 84703 CHORIONIC GONADOTROPIN ASSAY: CPT | Performed by: EMERGENCY MEDICINE

## 2025-08-15 PROCEDURE — 96375 TX/PRO/DX INJ NEW DRUG ADDON: CPT

## 2025-08-15 PROCEDURE — 83690 ASSAY OF LIPASE: CPT | Performed by: EMERGENCY MEDICINE

## 2025-08-15 PROCEDURE — 85004 AUTOMATED DIFF WBC COUNT: CPT | Performed by: EMERGENCY MEDICINE

## 2025-08-15 PROCEDURE — 96374 THER/PROPH/DIAG INJ IV PUSH: CPT

## 2025-08-15 RX ORDER — MORPHINE SULFATE 4 MG/ML
4 INJECTION, SOLUTION INTRAMUSCULAR; INTRAVENOUS ONCE
Refills: 0 | Status: COMPLETED | OUTPATIENT
Start: 2025-08-15 | End: 2025-08-15

## 2025-08-15 RX ORDER — ONDANSETRON 4 MG/1
4 TABLET, ORALLY DISINTEGRATING ORAL EVERY 6 HOURS PRN
Qty: 10 TABLET | Refills: 0 | Status: SHIPPED | OUTPATIENT
Start: 2025-08-15 | End: 2025-08-18

## 2025-08-15 RX ORDER — ONDANSETRON 2 MG/ML
4 INJECTION INTRAMUSCULAR; INTRAVENOUS ONCE
Status: COMPLETED | OUTPATIENT
Start: 2025-08-15 | End: 2025-08-15

## 2025-08-15 RX ADMIN — ONDANSETRON 4 MG: 2 INJECTION, SOLUTION INTRAMUSCULAR; INTRAVENOUS at 04:38

## 2025-08-15 RX ADMIN — MORPHINE SULFATE 4 MG: 4 INJECTION, SOLUTION INTRAMUSCULAR; INTRAVENOUS at 04:38

## 2025-08-15 ASSESSMENT — ACTIVITIES OF DAILY LIVING (ADL)
ADLS_ACUITY_SCORE: 50

## 2025-08-15 ASSESSMENT — COLUMBIA-SUICIDE SEVERITY RATING SCALE - C-SSRS
6. HAVE YOU EVER DONE ANYTHING, STARTED TO DO ANYTHING, OR PREPARED TO DO ANYTHING TO END YOUR LIFE?: NO
2. HAVE YOU ACTUALLY HAD ANY THOUGHTS OF KILLING YOURSELF IN THE PAST MONTH?: NO
1. IN THE PAST MONTH, HAVE YOU WISHED YOU WERE DEAD OR WISHED YOU COULD GO TO SLEEP AND NOT WAKE UP?: NO

## (undated) DEVICE — LINEN DRAPE 54X72" 5467

## (undated) DEVICE — PACK C-SECTION LF PL15OTA83B

## (undated) DEVICE — SOL WATER IRRIG 1000ML BOTTLE 2F7114

## (undated) DEVICE — STPL SKIN SUBCUTICULAR INSORB  2030

## (undated) DEVICE — LINEN HALF SHEET 5512

## (undated) DEVICE — BAG CLEAR TRASH 1.3M 39X33" P4040C

## (undated) DEVICE — SU VICRYL 0 CT-1 36" J346H

## (undated) DEVICE — SPONGE LAP 18X18" X8435

## (undated) DEVICE — LINEN TOWEL PACK X10 5473

## (undated) DEVICE — CAP BABY PINK/BLUE IC-2

## (undated) DEVICE — LINEN FULL SHEET 5511

## (undated) DEVICE — PREP CHLORAPREP 26ML TINTED HI-LITE ORANGE 930815

## (undated) DEVICE — SYR TRANSFER DEVICE BLOOD NDL HOLDER 3648800

## (undated) DEVICE — TRANSFER DEVICE BLOOD NDL HOLDER 364880

## (undated) DEVICE — BLADE CLIPPER SGL USE 9680

## (undated) DEVICE — LINEN BABY BLANKET 5434

## (undated) DEVICE — HEMOSTAT ABSORBABLE ARISTA 5GM SM0007-USA

## (undated) DEVICE — ESU GROUND PAD ADULT W/CORD E7507

## (undated) DEVICE — CATH TRAY FOLEY SURESTEP 16FR DRAIN BAG STATOCK A899916

## (undated) DEVICE — SOL NACL 0.9% IRRIG 1000ML BOTTLE 2F7124

## (undated) DEVICE — PREP SKIN SCRUB TRAY 4461A

## (undated) DEVICE — PAD CHUX UNDERPAD 30X36" P3036C

## (undated) DEVICE — PREP CHLORAPREP 26ML TINTED ORANGE  260815

## (undated) DEVICE — GLOVE PROTEXIS POWDER FREE 6.5 ORTHOPEDIC 2D73ET65

## (undated) DEVICE — SU VICRYL 0 CT 36" J358H

## (undated) DEVICE — STOCKING SLEEVE VASOPRESS COMPRESSION CALF MED 18" VP501M

## (undated) DEVICE — SURGICEL POWDER ABSORBABLE HEMOSTAT 3GM 3013SP

## (undated) DEVICE — GLOVE BIOGEL PI SZ 6.5 40865

## (undated) DEVICE — SUCTION CANISTER MEDIVAC LINER 3000ML W/LID 65651-530

## (undated) DEVICE — PREP POVIDONE IODINE SOLUTION 10% 4OZ

## (undated) RX ORDER — OXYTOCIN/0.9 % SODIUM CHLORIDE 30/500 ML
PLASTIC BAG, INJECTION (ML) INTRAVENOUS
Status: DISPENSED
Start: 2024-11-29

## (undated) RX ORDER — ONDANSETRON 2 MG/ML
INJECTION INTRAMUSCULAR; INTRAVENOUS
Status: DISPENSED
Start: 2024-11-29

## (undated) RX ORDER — CEFAZOLIN SODIUM 1 G/3ML
INJECTION, POWDER, FOR SOLUTION INTRAMUSCULAR; INTRAVENOUS
Status: DISPENSED
Start: 2024-11-29

## (undated) RX ORDER — OXYTOCIN/0.9 % SODIUM CHLORIDE 30/500 ML
PLASTIC BAG, INJECTION (ML) INTRAVENOUS
Status: DISPENSED
Start: 2018-08-05

## (undated) RX ORDER — KETOROLAC TROMETHAMINE 30 MG/ML
INJECTION, SOLUTION INTRAMUSCULAR; INTRAVENOUS
Status: DISPENSED
Start: 2024-11-29

## (undated) RX ORDER — FENTANYL CITRATE 50 UG/ML
INJECTION, SOLUTION INTRAMUSCULAR; INTRAVENOUS
Status: DISPENSED
Start: 2018-08-05

## (undated) RX ORDER — FENTANYL CITRATE 50 UG/ML
INJECTION, SOLUTION INTRAMUSCULAR; INTRAVENOUS
Status: DISPENSED
Start: 2024-11-29

## (undated) RX ORDER — ONDANSETRON 2 MG/ML
INJECTION INTRAMUSCULAR; INTRAVENOUS
Status: DISPENSED
Start: 2018-08-05

## (undated) RX ORDER — MORPHINE SULFATE 1 MG/ML
INJECTION, SOLUTION EPIDURAL; INTRATHECAL; INTRAVENOUS
Status: DISPENSED
Start: 2018-08-05

## (undated) RX ORDER — PROPOFOL 10 MG/ML
INJECTION, EMULSION INTRAVENOUS
Status: DISPENSED
Start: 2024-11-29

## (undated) RX ORDER — DEXAMETHASONE SODIUM PHOSPHATE 4 MG/ML
INJECTION, SOLUTION INTRA-ARTICULAR; INTRALESIONAL; INTRAMUSCULAR; INTRAVENOUS; SOFT TISSUE
Status: DISPENSED
Start: 2024-11-29

## (undated) RX ORDER — PHENYLEPHRINE HCL IN 0.9% NACL 1 MG/10 ML
SYRINGE (ML) INTRAVENOUS
Status: DISPENSED
Start: 2018-08-05